# Patient Record
Sex: MALE | Race: BLACK OR AFRICAN AMERICAN | NOT HISPANIC OR LATINO | URBAN - METROPOLITAN AREA
[De-identification: names, ages, dates, MRNs, and addresses within clinical notes are randomized per-mention and may not be internally consistent; named-entity substitution may affect disease eponyms.]

---

## 2020-01-01 ENCOUNTER — INPATIENT (INPATIENT)
Facility: HOSPITAL | Age: 80
LOS: 2 days | End: 2020-10-15
Attending: INTERNAL MEDICINE | Admitting: HOSPITALIST
Payer: MEDICARE

## 2020-01-01 ENCOUNTER — INPATIENT (INPATIENT)
Facility: HOSPITAL | Age: 80
LOS: 13 days | Discharge: NURSING, REHAB OR LONG-TERM CARE | End: 2020-09-30
Attending: INTERNAL MEDICINE | Admitting: INTERNAL MEDICINE
Payer: MEDICARE

## 2020-01-01 VITALS
HEART RATE: 70 BPM | SYSTOLIC BLOOD PRESSURE: 146 MMHG | OXYGEN SATURATION: 97 % | TEMPERATURE: 98 F | RESPIRATION RATE: 16 BRPM | DIASTOLIC BLOOD PRESSURE: 74 MMHG

## 2020-01-01 VITALS
RESPIRATION RATE: 24 BRPM | SYSTOLIC BLOOD PRESSURE: 82 MMHG | DIASTOLIC BLOOD PRESSURE: 34 MMHG | WEIGHT: 169.98 LBS | OXYGEN SATURATION: 96 % | HEART RATE: 81 BPM | HEIGHT: 70 IN

## 2020-01-01 VITALS
SYSTOLIC BLOOD PRESSURE: 144 MMHG | DIASTOLIC BLOOD PRESSURE: 69 MMHG | HEART RATE: 80 BPM | RESPIRATION RATE: 18 BRPM | TEMPERATURE: 98 F | OXYGEN SATURATION: 99 %

## 2020-01-01 DIAGNOSIS — I10 ESSENTIAL (PRIMARY) HYPERTENSION: ICD-10-CM

## 2020-01-01 DIAGNOSIS — Z89.432 ACQUIRED ABSENCE OF LEFT FOOT: ICD-10-CM

## 2020-01-01 DIAGNOSIS — K61.1 RECTAL ABSCESS: ICD-10-CM

## 2020-01-01 DIAGNOSIS — E53.8 DEFICIENCY OF OTHER SPECIFIED B GROUP VITAMINS: ICD-10-CM

## 2020-01-01 DIAGNOSIS — N17.9 ACUTE KIDNEY FAILURE, UNSPECIFIED: ICD-10-CM

## 2020-01-01 DIAGNOSIS — D62 ACUTE POSTHEMORRHAGIC ANEMIA: ICD-10-CM

## 2020-01-01 DIAGNOSIS — E55.9 VITAMIN D DEFICIENCY, UNSPECIFIED: ICD-10-CM

## 2020-01-01 DIAGNOSIS — Z91.81 HISTORY OF FALLING: ICD-10-CM

## 2020-01-01 DIAGNOSIS — K51.919 ULCERATIVE COLITIS, UNSPECIFIED WITH UNSPECIFIED COMPLICATIONS: ICD-10-CM

## 2020-01-01 DIAGNOSIS — I95.9 HYPOTENSION, UNSPECIFIED: ICD-10-CM

## 2020-01-01 DIAGNOSIS — K25.4 CHRONIC OR UNSPECIFIED GASTRIC ULCER WITH HEMORRHAGE: ICD-10-CM

## 2020-01-01 DIAGNOSIS — D61.818 OTHER PANCYTOPENIA: ICD-10-CM

## 2020-01-01 DIAGNOSIS — L89.300 PRESSURE ULCER OF UNSPECIFIED BUTTOCK, UNSTAGEABLE: ICD-10-CM

## 2020-01-01 DIAGNOSIS — Z85.038 PERSONAL HISTORY OF OTHER MALIGNANT NEOPLASM OF LARGE INTESTINE: ICD-10-CM

## 2020-01-01 DIAGNOSIS — Z85.831 PERSONAL HISTORY OF MALIGNANT NEOPLASM OF SOFT TISSUE: ICD-10-CM

## 2020-01-01 DIAGNOSIS — K62.6 ULCER OF ANUS AND RECTUM: ICD-10-CM

## 2020-01-01 DIAGNOSIS — K52.9 NONINFECTIVE GASTROENTERITIS AND COLITIS, UNSPECIFIED: ICD-10-CM

## 2020-01-01 DIAGNOSIS — K20.9 ESOPHAGITIS, UNSPECIFIED: ICD-10-CM

## 2020-01-01 DIAGNOSIS — Z29.9 ENCOUNTER FOR PROPHYLACTIC MEASURES, UNSPECIFIED: ICD-10-CM

## 2020-01-01 DIAGNOSIS — E83.39 OTHER DISORDERS OF PHOSPHORUS METABOLISM: ICD-10-CM

## 2020-01-01 DIAGNOSIS — D51.9 VITAMIN B12 DEFICIENCY ANEMIA, UNSPECIFIED: ICD-10-CM

## 2020-01-01 DIAGNOSIS — Z85.46 PERSONAL HISTORY OF MALIGNANT NEOPLASM OF PROSTATE: ICD-10-CM

## 2020-01-01 DIAGNOSIS — Z96.649 PRESENCE OF UNSPECIFIED ARTIFICIAL HIP JOINT: Chronic | ICD-10-CM

## 2020-01-01 DIAGNOSIS — K64.8 OTHER HEMORRHOIDS: ICD-10-CM

## 2020-01-01 DIAGNOSIS — K29.71 GASTRITIS, UNSPECIFIED, WITH BLEEDING: ICD-10-CM

## 2020-01-01 DIAGNOSIS — D64.9 ANEMIA, UNSPECIFIED: ICD-10-CM

## 2020-01-01 DIAGNOSIS — E87.2 ACIDOSIS: ICD-10-CM

## 2020-01-01 DIAGNOSIS — Z90.49 ACQUIRED ABSENCE OF OTHER SPECIFIED PARTS OF DIGESTIVE TRACT: Chronic | ICD-10-CM

## 2020-01-01 DIAGNOSIS — D69.6 THROMBOCYTOPENIA, UNSPECIFIED: ICD-10-CM

## 2020-01-01 DIAGNOSIS — K62.89 OTHER SPECIFIED DISEASES OF ANUS AND RECTUM: ICD-10-CM

## 2020-01-01 DIAGNOSIS — E87.6 HYPOKALEMIA: ICD-10-CM

## 2020-01-01 DIAGNOSIS — I24.8 OTHER FORMS OF ACUTE ISCHEMIC HEART DISEASE: ICD-10-CM

## 2020-01-01 DIAGNOSIS — D72.829 ELEVATED WHITE BLOOD CELL COUNT, UNSPECIFIED: ICD-10-CM

## 2020-01-01 DIAGNOSIS — F43.9 REACTION TO SEVERE STRESS, UNSPECIFIED: ICD-10-CM

## 2020-01-01 DIAGNOSIS — K57.30 DIVERTICULOSIS OF LARGE INTESTINE WITHOUT PERFORATION OR ABSCESS WITHOUT BLEEDING: ICD-10-CM

## 2020-01-01 DIAGNOSIS — K92.2 GASTROINTESTINAL HEMORRHAGE, UNSPECIFIED: ICD-10-CM

## 2020-01-01 DIAGNOSIS — R94.31 ABNORMAL ELECTROCARDIOGRAM [ECG] [EKG]: ICD-10-CM

## 2020-01-01 DIAGNOSIS — Z90.49 ACQUIRED ABSENCE OF OTHER SPECIFIED PARTS OF DIGESTIVE TRACT: ICD-10-CM

## 2020-01-01 DIAGNOSIS — R06.03 ACUTE RESPIRATORY DISTRESS: ICD-10-CM

## 2020-01-01 DIAGNOSIS — Z02.9 ENCOUNTER FOR ADMINISTRATIVE EXAMINATIONS, UNSPECIFIED: ICD-10-CM

## 2020-01-01 DIAGNOSIS — K51.90 ULCERATIVE COLITIS, UNSPECIFIED, WITHOUT COMPLICATIONS: ICD-10-CM

## 2020-01-01 LAB
% ALBUMIN: 55.4 % — SIGNIFICANT CHANGE UP
% ALPHA 1: 10.3 % — SIGNIFICANT CHANGE UP
% ALPHA 2: 13.5 % — SIGNIFICANT CHANGE UP
% BETA: 9.8 % — SIGNIFICANT CHANGE UP
% GAMMA: 11 % — SIGNIFICANT CHANGE UP
% M SPIKE: 2.9 % — SIGNIFICANT CHANGE UP
24R-OH-CALCIDIOL SERPL-MCNC: 21.3 NG/ML — LOW (ref 30–80)
ABO RH CONFIRMATION: SIGNIFICANT CHANGE UP
ALBUMIN SERPL ELPH-MCNC: 1.8 G/DL — LOW (ref 3.3–5)
ALBUMIN SERPL ELPH-MCNC: 2.2 G/DL — LOW (ref 3.3–5)
ALBUMIN SERPL ELPH-MCNC: 2.7 G/DL — LOW (ref 3.3–5)
ALBUMIN SERPL ELPH-MCNC: 2.9 G/DL — LOW (ref 3.3–5)
ALBUMIN SERPL ELPH-MCNC: 2.9 G/DL — LOW (ref 3.6–5.5)
ALBUMIN SERPL ELPH-MCNC: 3.2 G/DL — LOW (ref 3.3–5)
ALBUMIN/GLOB SERPL ELPH: 1.2 RATIO — SIGNIFICANT CHANGE UP
ALP SERPL-CCNC: 42 U/L — SIGNIFICANT CHANGE UP (ref 40–120)
ALP SERPL-CCNC: 42 U/L — SIGNIFICANT CHANGE UP (ref 40–120)
ALP SERPL-CCNC: 47 U/L — SIGNIFICANT CHANGE UP (ref 40–120)
ALP SERPL-CCNC: 53 U/L — SIGNIFICANT CHANGE UP (ref 40–120)
ALP SERPL-CCNC: 91 U/L — SIGNIFICANT CHANGE UP (ref 40–120)
ALPHA1 GLOB SERPL ELPH-MCNC: 0.5 G/DL — HIGH (ref 0.1–0.4)
ALPHA2 GLOB SERPL ELPH-MCNC: 0.7 G/DL — SIGNIFICANT CHANGE UP (ref 0.5–1)
ALT FLD-CCNC: 19 U/L — SIGNIFICANT CHANGE UP (ref 4–41)
ALT FLD-CCNC: 2099 U/L — HIGH (ref 4–41)
ALT FLD-CCNC: 21 U/L — SIGNIFICANT CHANGE UP (ref 4–41)
ALT FLD-CCNC: 23 U/L — SIGNIFICANT CHANGE UP (ref 12–78)
ALT FLD-CCNC: 32 U/L — SIGNIFICANT CHANGE UP (ref 12–78)
AMMONIA BLD-MCNC: 15 UMOL/L — SIGNIFICANT CHANGE UP (ref 11–32)
ANION GAP SERPL CALC-SCNC: 10 MMO/L — SIGNIFICANT CHANGE UP (ref 7–14)
ANION GAP SERPL CALC-SCNC: 12 MMOL/L — SIGNIFICANT CHANGE UP (ref 5–17)
ANION GAP SERPL CALC-SCNC: 14 MMO/L — SIGNIFICANT CHANGE UP (ref 7–14)
ANION GAP SERPL CALC-SCNC: 20 MMO/L — HIGH (ref 7–14)
ANION GAP SERPL CALC-SCNC: 31 MMO/L — HIGH (ref 7–14)
ANION GAP SERPL CALC-SCNC: 6 MMOL/L — SIGNIFICANT CHANGE UP (ref 5–17)
ANION GAP SERPL CALC-SCNC: 6 MMOL/L — SIGNIFICANT CHANGE UP (ref 5–17)
ANION GAP SERPL CALC-SCNC: 7 MMOL/L — SIGNIFICANT CHANGE UP (ref 5–17)
ANION GAP SERPL CALC-SCNC: 9 MMOL/L — SIGNIFICANT CHANGE UP (ref 5–17)
ANISOCYTOSIS BLD QL: SLIGHT — SIGNIFICANT CHANGE UP
ANISOCYTOSIS BLD QL: SLIGHT — SIGNIFICANT CHANGE UP
APPEARANCE UR: CLEAR — SIGNIFICANT CHANGE UP
APTT BLD: 136.9 SEC — CRITICAL HIGH (ref 27–36.3)
APTT BLD: 28.9 SEC — SIGNIFICANT CHANGE UP (ref 27–36.3)
APTT BLD: 31.4 SEC — SIGNIFICANT CHANGE UP (ref 27.5–35.5)
APTT BLD: 33.9 SEC — SIGNIFICANT CHANGE UP (ref 27.5–35.5)
APTT BLD: 55.5 SEC — HIGH (ref 27–36.3)
APTT BLD: 83 SEC — HIGH (ref 27–36.3)
AST SERPL-CCNC: 1247 U/L — HIGH (ref 4–40)
AST SERPL-CCNC: 13 U/L — SIGNIFICANT CHANGE UP (ref 4–40)
AST SERPL-CCNC: 20 U/L — SIGNIFICANT CHANGE UP (ref 4–40)
AST SERPL-CCNC: 22 U/L — SIGNIFICANT CHANGE UP (ref 15–37)
AST SERPL-CCNC: 24 U/L — SIGNIFICANT CHANGE UP (ref 15–37)
B-GLOBULIN SERPL ELPH-MCNC: 0.5 G/DL — SIGNIFICANT CHANGE UP (ref 0.5–1)
BASE EXCESS BLDA CALC-SCNC: -16.6 MMOL/L — SIGNIFICANT CHANGE UP
BASE EXCESS BLDA CALC-SCNC: -18.4 MMOL/L — SIGNIFICANT CHANGE UP
BASE EXCESS BLDA CALC-SCNC: -19.3 MMOL/L — SIGNIFICANT CHANGE UP
BASE EXCESS BLDA CALC-SCNC: -2.2 MMOL/L — SIGNIFICANT CHANGE UP
BASE EXCESS BLDA CALC-SCNC: -8 MMOL/L — LOW (ref -2–2)
BASE EXCESS BLDA CALC-SCNC: -9.4 MMOL/L — SIGNIFICANT CHANGE UP
BASOPHILS # BLD AUTO: 0 K/UL — SIGNIFICANT CHANGE UP (ref 0–0.2)
BASOPHILS # BLD AUTO: 0 K/UL — SIGNIFICANT CHANGE UP (ref 0–0.2)
BASOPHILS # BLD AUTO: 0.02 K/UL — SIGNIFICANT CHANGE UP (ref 0–0.2)
BASOPHILS # BLD AUTO: 0.12 K/UL — SIGNIFICANT CHANGE UP (ref 0–0.2)
BASOPHILS # BLD AUTO: 0.23 K/UL — HIGH (ref 0–0.2)
BASOPHILS # BLD AUTO: 0.33 K/UL — HIGH (ref 0–0.2)
BASOPHILS # BLD AUTO: 0.43 K/UL — HIGH (ref 0–0.2)
BASOPHILS # BLD AUTO: 0.5 K/UL — HIGH (ref 0–0.2)
BASOPHILS NFR BLD AUTO: 0 % — SIGNIFICANT CHANGE UP (ref 0–2)
BASOPHILS NFR BLD AUTO: 0 % — SIGNIFICANT CHANGE UP (ref 0–2)
BASOPHILS NFR BLD AUTO: 0.1 % — SIGNIFICANT CHANGE UP (ref 0–2)
BASOPHILS NFR BLD AUTO: 0.2 % — SIGNIFICANT CHANGE UP (ref 0–2)
BASOPHILS NFR BLD AUTO: 0.3 % — SIGNIFICANT CHANGE UP (ref 0–2)
BASOPHILS NFR BLD AUTO: 0.3 % — SIGNIFICANT CHANGE UP (ref 0–2)
BASOPHILS NFR SPEC: 0 % — SIGNIFICANT CHANGE UP (ref 0–2)
BILIRUB DIRECT SERPL-MCNC: 0.25 MG/DL — HIGH (ref 0.05–0.2)
BILIRUB INDIRECT FLD-MCNC: 1 MG/DL — SIGNIFICANT CHANGE UP (ref 0.2–1)
BILIRUB SERPL-MCNC: 0.8 MG/DL — SIGNIFICANT CHANGE UP (ref 0.2–1.2)
BILIRUB SERPL-MCNC: 1 MG/DL — SIGNIFICANT CHANGE UP (ref 0.2–1.2)
BILIRUB SERPL-MCNC: 1.2 MG/DL — SIGNIFICANT CHANGE UP (ref 0.2–1.2)
BILIRUB SERPL-MCNC: 1.2 MG/DL — SIGNIFICANT CHANGE UP (ref 0.2–1.2)
BILIRUB SERPL-MCNC: 1.8 MG/DL — HIGH (ref 0.2–1.2)
BILIRUB UR-MCNC: NEGATIVE — SIGNIFICANT CHANGE UP
BLASTS # FLD: 0 % — SIGNIFICANT CHANGE UP (ref 0–0)
BLD GP AB SCN SERPL QL: NEGATIVE — SIGNIFICANT CHANGE UP
BLD GP AB SCN SERPL QL: NEGATIVE — SIGNIFICANT CHANGE UP
BLD GP AB SCN SERPL QL: SIGNIFICANT CHANGE UP
BLOOD GAS ARTERIAL - FIO2: 100 — SIGNIFICANT CHANGE UP
BLOOD GAS ARTERIAL - FIO2: 29 — SIGNIFICANT CHANGE UP
BLOOD GAS ARTERIAL - FIO2: 50 — SIGNIFICANT CHANGE UP
BLOOD GAS ARTERIAL - FIO2: 80 — SIGNIFICANT CHANGE UP
BLOOD GAS COMMENTS: SIGNIFICANT CHANGE UP
BLOOD GAS COMMENTS: SIGNIFICANT CHANGE UP
BLOOD GAS SOURCE: SIGNIFICANT CHANGE UP
BUN SERPL-MCNC: 11 MG/DL — SIGNIFICANT CHANGE UP (ref 7–23)
BUN SERPL-MCNC: 12 MG/DL — SIGNIFICANT CHANGE UP (ref 7–23)
BUN SERPL-MCNC: 16 MG/DL — SIGNIFICANT CHANGE UP (ref 7–23)
BUN SERPL-MCNC: 21 MG/DL — SIGNIFICANT CHANGE UP (ref 7–23)
BUN SERPL-MCNC: 23 MG/DL — SIGNIFICANT CHANGE UP (ref 7–23)
BUN SERPL-MCNC: 27 MG/DL — HIGH (ref 7–23)
BUN SERPL-MCNC: 35 MG/DL — HIGH (ref 7–23)
BUN SERPL-MCNC: 36 MG/DL — HIGH (ref 7–23)
BUN SERPL-MCNC: 39 MG/DL — HIGH (ref 7–23)
BUN SERPL-MCNC: 39 MG/DL — HIGH (ref 7–23)
BUN SERPL-MCNC: 53 MG/DL — HIGH (ref 7–23)
BUN SERPL-MCNC: 7 MG/DL — SIGNIFICANT CHANGE UP (ref 7–23)
BUN SERPL-MCNC: 8 MG/DL — SIGNIFICANT CHANGE UP (ref 7–23)
BUN SERPL-MCNC: 82 MG/DL — HIGH (ref 7–23)
BUN SERPL-MCNC: 87 MG/DL — HIGH (ref 7–23)
CA-I BLDA-SCNC: 1.11 MMOL/L — LOW (ref 1.15–1.29)
CA-I BLDA-SCNC: 1.13 MMOL/L — LOW (ref 1.15–1.29)
CALCIUM SERPL-MCNC: 6.9 MG/DL — LOW (ref 8.4–10.5)
CALCIUM SERPL-MCNC: 7.3 MG/DL — LOW (ref 8.5–10.1)
CALCIUM SERPL-MCNC: 7.3 MG/DL — LOW (ref 8.5–10.1)
CALCIUM SERPL-MCNC: 7.4 MG/DL — LOW (ref 8.5–10.1)
CALCIUM SERPL-MCNC: 7.5 MG/DL — LOW (ref 8.5–10.1)
CALCIUM SERPL-MCNC: 7.6 MG/DL — LOW (ref 8.5–10.1)
CALCIUM SERPL-MCNC: 7.7 MG/DL — LOW (ref 8.4–10.5)
CALCIUM SERPL-MCNC: 7.9 MG/DL — LOW (ref 8.5–10.1)
CALCIUM SERPL-MCNC: 7.9 MG/DL — LOW (ref 8.5–10.1)
CALCIUM SERPL-MCNC: 8 MG/DL — LOW (ref 8.5–10.1)
CALCIUM SERPL-MCNC: 8.3 MG/DL — LOW (ref 8.5–10.1)
CALCIUM SERPL-MCNC: 9.3 MG/DL — SIGNIFICANT CHANGE UP (ref 8.4–10.5)
CALCIUM SERPL-MCNC: 9.4 MG/DL — SIGNIFICANT CHANGE UP (ref 8.4–10.5)
CHLORIDE BLDA-SCNC: 110 MMOL/L — HIGH (ref 96–108)
CHLORIDE BLDA-SCNC: 111 MMOL/L — HIGH (ref 96–108)
CHLORIDE SERPL-SCNC: 102 MMOL/L — SIGNIFICANT CHANGE UP (ref 98–107)
CHLORIDE SERPL-SCNC: 103 MMOL/L — SIGNIFICANT CHANGE UP (ref 98–107)
CHLORIDE SERPL-SCNC: 104 MMOL/L — SIGNIFICANT CHANGE UP (ref 96–108)
CHLORIDE SERPL-SCNC: 105 MMOL/L — SIGNIFICANT CHANGE UP (ref 98–107)
CHLORIDE SERPL-SCNC: 106 MMOL/L — SIGNIFICANT CHANGE UP (ref 98–107)
CHLORIDE SERPL-SCNC: 109 MMOL/L — HIGH (ref 96–108)
CHLORIDE SERPL-SCNC: 110 MMOL/L — HIGH (ref 96–108)
CHLORIDE SERPL-SCNC: 111 MMOL/L — HIGH (ref 96–108)
CHLORIDE SERPL-SCNC: 112 MMOL/L — HIGH (ref 96–108)
CHLORIDE SERPL-SCNC: 112 MMOL/L — HIGH (ref 96–108)
CHLORIDE SERPL-SCNC: 113 MMOL/L — HIGH (ref 96–108)
CHLORIDE SERPL-SCNC: 113 MMOL/L — HIGH (ref 96–108)
CHLORIDE SERPL-SCNC: 114 MMOL/L — HIGH (ref 96–108)
CHLORIDE SERPL-SCNC: 117 MMOL/L — HIGH (ref 96–108)
CHLORIDE SERPL-SCNC: 118 MMOL/L — HIGH (ref 96–108)
CHROM ANALY INTERPHASE BLD FISH-IMP: SIGNIFICANT CHANGE UP
CHROM ANALY OVERALL INTERP SPEC-IMP: SIGNIFICANT CHANGE UP
CO2 SERPL-SCNC: 16 MMOL/L — LOW (ref 22–31)
CO2 SERPL-SCNC: 20 MMOL/L — LOW (ref 22–31)
CO2 SERPL-SCNC: 21 MMOL/L — LOW (ref 22–31)
CO2 SERPL-SCNC: 21 MMOL/L — LOW (ref 22–31)
CO2 SERPL-SCNC: 22 MMOL/L — SIGNIFICANT CHANGE UP (ref 22–31)
CO2 SERPL-SCNC: 23 MMOL/L — SIGNIFICANT CHANGE UP (ref 22–31)
CO2 SERPL-SCNC: 24 MMOL/L — SIGNIFICANT CHANGE UP (ref 22–31)
CO2 SERPL-SCNC: 24 MMOL/L — SIGNIFICANT CHANGE UP (ref 22–31)
CO2 SERPL-SCNC: 25 MMOL/L — SIGNIFICANT CHANGE UP (ref 22–31)
CO2 SERPL-SCNC: 7 MMOL/L — CRITICAL LOW (ref 22–31)
COLOR SPEC: YELLOW — SIGNIFICANT CHANGE UP
CREAT SERPL-MCNC: 0.52 MG/DL — SIGNIFICANT CHANGE UP (ref 0.5–1.3)
CREAT SERPL-MCNC: 0.56 MG/DL — SIGNIFICANT CHANGE UP (ref 0.5–1.3)
CREAT SERPL-MCNC: 0.56 MG/DL — SIGNIFICANT CHANGE UP (ref 0.5–1.3)
CREAT SERPL-MCNC: 0.58 MG/DL — SIGNIFICANT CHANGE UP (ref 0.5–1.3)
CREAT SERPL-MCNC: 0.58 MG/DL — SIGNIFICANT CHANGE UP (ref 0.5–1.3)
CREAT SERPL-MCNC: 0.6 MG/DL — SIGNIFICANT CHANGE UP (ref 0.5–1.3)
CREAT SERPL-MCNC: 0.62 MG/DL — SIGNIFICANT CHANGE UP (ref 0.5–1.3)
CREAT SERPL-MCNC: 0.67 MG/DL — SIGNIFICANT CHANGE UP (ref 0.5–1.3)
CREAT SERPL-MCNC: 0.7 MG/DL — SIGNIFICANT CHANGE UP (ref 0.5–1.3)
CREAT SERPL-MCNC: 0.75 MG/DL — SIGNIFICANT CHANGE UP (ref 0.5–1.3)
CREAT SERPL-MCNC: 0.85 MG/DL — SIGNIFICANT CHANGE UP (ref 0.5–1.3)
CREAT SERPL-MCNC: 0.93 MG/DL — SIGNIFICANT CHANGE UP (ref 0.5–1.3)
CREAT SERPL-MCNC: 0.95 MG/DL — SIGNIFICANT CHANGE UP (ref 0.5–1.3)
CREAT SERPL-MCNC: 1.02 MG/DL — SIGNIFICANT CHANGE UP (ref 0.5–1.3)
CREAT SERPL-MCNC: 2.39 MG/DL — HIGH (ref 0.5–1.3)
CULTURE RESULTS: NO GROWTH — SIGNIFICANT CHANGE UP
CULTURE RESULTS: SIGNIFICANT CHANGE UP
D DIMER BLD IA.RAPID-MCNC: 3345 NG/ML — SIGNIFICANT CHANGE UP
D DIMER BLD IA.RAPID-MCNC: 3583 NG/ML — SIGNIFICANT CHANGE UP
DIFF PNL FLD: NEGATIVE — SIGNIFICANT CHANGE UP
E COLI DNA BLD POS QL NAA+NON-PROBE: SIGNIFICANT CHANGE UP
ELLIPTOCYTES BLD QL SMEAR: SLIGHT — SIGNIFICANT CHANGE UP
ELLIPTOCYTES BLD QL SMEAR: SLIGHT — SIGNIFICANT CHANGE UP
EOSINOPHIL # BLD AUTO: 0 K/UL — SIGNIFICANT CHANGE UP (ref 0–0.5)
EOSINOPHIL # BLD AUTO: 0.01 K/UL — SIGNIFICANT CHANGE UP (ref 0–0.5)
EOSINOPHIL # BLD AUTO: 0.02 K/UL — SIGNIFICANT CHANGE UP (ref 0–0.5)
EOSINOPHIL # BLD AUTO: 0.04 K/UL — SIGNIFICANT CHANGE UP (ref 0–0.5)
EOSINOPHIL # BLD AUTO: 0.05 K/UL — SIGNIFICANT CHANGE UP (ref 0–0.5)
EOSINOPHIL # BLD AUTO: 0.32 K/UL — SIGNIFICANT CHANGE UP (ref 0–0.5)
EOSINOPHIL NFR BLD AUTO: 0 % — SIGNIFICANT CHANGE UP (ref 0–6)
EOSINOPHIL NFR BLD AUTO: 0.1 % — SIGNIFICANT CHANGE UP (ref 0–6)
EOSINOPHIL NFR BLD AUTO: 0.2 % — SIGNIFICANT CHANGE UP (ref 0–6)
EOSINOPHIL NFR FLD: 0 % — SIGNIFICANT CHANGE UP (ref 0–6)
FERRITIN SERPL-MCNC: 562 NG/ML — HIGH (ref 30–400)
FIBRINOGEN PPP-MCNC: 179 MG/DL — LOW (ref 290–520)
FIBRINOGEN PPP-MCNC: 246 MG/DL — LOW (ref 290–520)
FIBRINOGEN PPP-MCNC: 398 MG/DL — SIGNIFICANT CHANGE UP (ref 350–510)
FIBRINOGEN PPP-MCNC: SIGNIFICANT CHANGE UP MG/DL (ref 290–520)
FOLATE SERPL-MCNC: 2.6 NG/ML — LOW
GAMMA GLOBULIN: 0.6 G/DL — SIGNIFICANT CHANGE UP (ref 0.6–1.6)
GIANT PLATELETS BLD QL SMEAR: PRESENT — SIGNIFICANT CHANGE UP
GLUCOSE BLDA-MCNC: 114 MG/DL — HIGH (ref 70–99)
GLUCOSE BLDA-MCNC: 140 MG/DL — HIGH (ref 70–99)
GLUCOSE BLDA-MCNC: 28 MG/DL — CRITICAL LOW (ref 70–99)
GLUCOSE BLDA-MCNC: 78 MG/DL — SIGNIFICANT CHANGE UP (ref 70–99)
GLUCOSE BLDA-MCNC: 78 MG/DL — SIGNIFICANT CHANGE UP (ref 70–99)
GLUCOSE BLDC GLUCOMTR-MCNC: 108 MG/DL — HIGH (ref 70–99)
GLUCOSE BLDC GLUCOMTR-MCNC: 110 MG/DL — HIGH (ref 70–99)
GLUCOSE BLDC GLUCOMTR-MCNC: 119 MG/DL — HIGH (ref 70–99)
GLUCOSE BLDC GLUCOMTR-MCNC: 131 MG/DL — HIGH (ref 70–99)
GLUCOSE BLDC GLUCOMTR-MCNC: 133 MG/DL — HIGH (ref 70–99)
GLUCOSE BLDC GLUCOMTR-MCNC: 160 MG/DL — HIGH (ref 70–99)
GLUCOSE BLDC GLUCOMTR-MCNC: 26 MG/DL — CRITICAL LOW (ref 70–99)
GLUCOSE SERPL-MCNC: 106 MG/DL — HIGH (ref 70–99)
GLUCOSE SERPL-MCNC: 108 MG/DL — HIGH (ref 70–99)
GLUCOSE SERPL-MCNC: 109 MG/DL — HIGH (ref 70–99)
GLUCOSE SERPL-MCNC: 110 MG/DL — HIGH (ref 70–99)
GLUCOSE SERPL-MCNC: 111 MG/DL — HIGH (ref 70–99)
GLUCOSE SERPL-MCNC: 115 MG/DL — HIGH (ref 70–99)
GLUCOSE SERPL-MCNC: 128 MG/DL — HIGH (ref 70–99)
GLUCOSE SERPL-MCNC: 131 MG/DL — HIGH (ref 70–99)
GLUCOSE SERPL-MCNC: 136 MG/DL — HIGH (ref 70–99)
GLUCOSE SERPL-MCNC: 160 MG/DL — HIGH (ref 70–99)
GLUCOSE SERPL-MCNC: 174 MG/DL — HIGH (ref 70–99)
GLUCOSE SERPL-MCNC: 196 MG/DL — HIGH (ref 70–99)
GLUCOSE SERPL-MCNC: 266 MG/DL — HIGH (ref 70–99)
GLUCOSE SERPL-MCNC: 80 MG/DL — SIGNIFICANT CHANGE UP (ref 70–99)
GLUCOSE SERPL-MCNC: 88 MG/DL — SIGNIFICANT CHANGE UP (ref 70–99)
GLUCOSE UR QL: NEGATIVE MG/DL — SIGNIFICANT CHANGE UP
GRAM STN FLD: SIGNIFICANT CHANGE UP
HAPTOGLOB SERPL-MCNC: 100 MG/DL — SIGNIFICANT CHANGE UP (ref 34–200)
HAPTOGLOB SERPL-MCNC: < 20 MG/DL — LOW (ref 34–200)
HCO3 BLDA-SCNC: 10 MMOL/L — CRITICAL LOW (ref 22–26)
HCO3 BLDA-SCNC: 10 MMOL/L — CRITICAL LOW (ref 22–26)
HCO3 BLDA-SCNC: 12 MMOL/L — LOW (ref 22–26)
HCO3 BLDA-SCNC: 15 MMOL/L — LOW (ref 21–29)
HCO3 BLDA-SCNC: 17 MMOL/L — LOW (ref 22–26)
HCO3 BLDA-SCNC: 23 MMOL/L — SIGNIFICANT CHANGE UP (ref 22–26)
HCT VFR BLD CALC: 15.5 % — CRITICAL LOW (ref 39–50)
HCT VFR BLD CALC: 19.4 % — CRITICAL LOW (ref 39–50)
HCT VFR BLD CALC: 21.3 % — LOW (ref 39–50)
HCT VFR BLD CALC: 21.7 % — LOW (ref 39–50)
HCT VFR BLD CALC: 21.8 % — LOW (ref 39–50)
HCT VFR BLD CALC: 21.9 % — LOW (ref 39–50)
HCT VFR BLD CALC: 23.1 % — LOW (ref 39–50)
HCT VFR BLD CALC: 23.7 % — LOW (ref 39–50)
HCT VFR BLD CALC: 25 % — LOW (ref 39–50)
HCT VFR BLD CALC: 25.9 % — LOW (ref 39–50)
HCT VFR BLD CALC: 26.2 % — LOW (ref 39–50)
HCT VFR BLD CALC: 26.9 % — LOW (ref 39–50)
HCT VFR BLD CALC: 27.2 % — LOW (ref 39–50)
HCT VFR BLD CALC: 27.6 % — LOW (ref 39–50)
HCT VFR BLD CALC: 28.3 % — LOW (ref 39–50)
HCT VFR BLD CALC: 28.6 % — LOW (ref 39–50)
HCT VFR BLD CALC: 28.6 % — LOW (ref 39–50)
HCT VFR BLD CALC: 29.2 % — LOW (ref 39–50)
HCT VFR BLD CALC: 29.5 % — LOW (ref 39–50)
HCT VFR BLD CALC: 32.5 % — LOW (ref 39–50)
HCT VFR BLDA CALC: 22.7 % — LOW (ref 39–51)
HCT VFR BLDA CALC: 23 % — LOW (ref 39–51)
HCT VFR BLDA CALC: 23 % — LOW (ref 39–51)
HCT VFR BLDA CALC: 23.5 % — LOW (ref 39–51)
HCT VFR BLDA CALC: 23.9 % — LOW (ref 39–51)
HGB BLD-MCNC: 10.7 G/DL — LOW (ref 13–17)
HGB BLD-MCNC: 5 G/DL — CRITICAL LOW (ref 13–17)
HGB BLD-MCNC: 6.4 G/DL — CRITICAL LOW (ref 13–17)
HGB BLD-MCNC: 6.9 G/DL — CRITICAL LOW (ref 13–17)
HGB BLD-MCNC: 7.2 G/DL — LOW (ref 13–17)
HGB BLD-MCNC: 7.2 G/DL — LOW (ref 13–17)
HGB BLD-MCNC: 7.3 G/DL — LOW (ref 13–17)
HGB BLD-MCNC: 7.3 G/DL — LOW (ref 13–17)
HGB BLD-MCNC: 7.6 G/DL — LOW (ref 13–17)
HGB BLD-MCNC: 8 G/DL — LOW (ref 13–17)
HGB BLD-MCNC: 8 G/DL — LOW (ref 13–17)
HGB BLD-MCNC: 8.6 G/DL — LOW (ref 13–17)
HGB BLD-MCNC: 8.8 G/DL — LOW (ref 13–17)
HGB BLD-MCNC: 8.8 G/DL — LOW (ref 13–17)
HGB BLD-MCNC: 8.9 G/DL — LOW (ref 13–17)
HGB BLD-MCNC: 8.9 G/DL — LOW (ref 13–17)
HGB BLD-MCNC: 9.3 G/DL — LOW (ref 13–17)
HGB BLD-MCNC: 9.5 G/DL — LOW (ref 13–17)
HGB BLDA-MCNC: 7.3 G/DL — LOW (ref 13–17)
HGB BLDA-MCNC: 7.4 G/DL — LOW (ref 13–17)
HGB BLDA-MCNC: 7.4 G/DL — LOW (ref 13–17)
HGB BLDA-MCNC: 7.5 G/DL — LOW (ref 13–17)
HGB BLDA-MCNC: 7.7 G/DL — LOW (ref 13–17)
HOROWITZ INDEX BLDA+IHG-RTO: 21 — SIGNIFICANT CHANGE UP
HYPOCHROMIA BLD QL: SLIGHT — SIGNIFICANT CHANGE UP
IMM GRANULOCYTES NFR BLD AUTO: 25.1 % — HIGH (ref 0–1.5)
IMM GRANULOCYTES NFR BLD AUTO: 26.6 % — HIGH (ref 0–1.5)
IMM GRANULOCYTES NFR BLD AUTO: 26.8 % — HIGH (ref 0–1.5)
IMM GRANULOCYTES NFR BLD AUTO: 32.5 % — HIGH (ref 0–1.5)
IMM GRANULOCYTES NFR BLD AUTO: 34.2 % — HIGH (ref 0–1.5)
IMM GRANULOCYTES NFR BLD AUTO: 7.1 % — HIGH (ref 0–1.5)
INR BLD: 1.26 RATIO — HIGH (ref 0.88–1.16)
INR BLD: 1.35 — HIGH (ref 0.88–1.16)
INR BLD: 1.37 RATIO — HIGH (ref 0.88–1.16)
INR BLD: 11.7 — CRITICAL HIGH (ref 0.88–1.16)
INTERPRETATION SERPL IFE-IMP: SIGNIFICANT CHANGE UP
IRON SATN MFR SERPL: 18 % — SIGNIFICANT CHANGE UP (ref 16–55)
IRON SATN MFR SERPL: 26 UG/DL — LOW (ref 45–165)
KAPPA LC SER QL IFE: 2.14 MG/DL — HIGH (ref 0.33–1.94)
KAPPA/LAMBDA FREE LIGHT CHAIN RATIO, SERUM: 1.66 RATIO — HIGH (ref 0.26–1.65)
KETONES UR-MCNC: ABNORMAL
LACTATE BLDA-SCNC: 10.2 MMOL/L — CRITICAL HIGH (ref 0.5–2)
LACTATE BLDA-SCNC: 10.9 MMOL/L — CRITICAL HIGH (ref 0.5–2)
LACTATE BLDA-SCNC: 17 MMOL/L — CRITICAL HIGH (ref 0.5–2)
LACTATE BLDA-SCNC: 20 MMOL/L — CRITICAL HIGH (ref 0.5–2)
LACTATE BLDA-SCNC: 7.1 MMOL/L — CRITICAL HIGH (ref 0.5–2)
LACTATE SERPL-SCNC: 1.8 MMOL/L — SIGNIFICANT CHANGE UP (ref 0.7–2)
LACTATE SERPL-SCNC: 2.4 MMOL/L — HIGH (ref 0.7–2)
LAMBDA LC SER QL IFE: 1.29 MG/DL — SIGNIFICANT CHANGE UP (ref 0.57–2.63)
LDH SERPL L TO P-CCNC: 255 U/L — HIGH (ref 50–242)
LDH SERPL L TO P-CCNC: 562 U/L — HIGH (ref 135–225)
LDH SERPL L TO P-CCNC: 639 U/L — HIGH (ref 135–225)
LDH SERPL L TO P-CCNC: > 1800 U/L — HIGH (ref 135–225)
LDH SERPL L TO P-CCNC: > 1800 U/L — HIGH (ref 135–225)
LEUKOCYTE ESTERASE UR-ACNC: NEGATIVE — SIGNIFICANT CHANGE UP
LYMPHOCYTES # BLD AUTO: 0.56 K/UL — LOW (ref 1–3.3)
LYMPHOCYTES # BLD AUTO: 0.66 K/UL — LOW (ref 1–3.3)
LYMPHOCYTES # BLD AUTO: 0.9 % — LOW (ref 13–44)
LYMPHOCYTES # BLD AUTO: 0.92 K/UL — LOW (ref 1–3.3)
LYMPHOCYTES # BLD AUTO: 1 % — LOW (ref 13–44)
LYMPHOCYTES # BLD AUTO: 1.1 % — LOW (ref 13–44)
LYMPHOCYTES # BLD AUTO: 1.2 K/UL — SIGNIFICANT CHANGE UP (ref 1–3.3)
LYMPHOCYTES # BLD AUTO: 1.8 % — LOW (ref 13–44)
LYMPHOCYTES # BLD AUTO: 11 % — LOW (ref 13–44)
LYMPHOCYTES # BLD AUTO: 12 % — LOW (ref 13–44)
LYMPHOCYTES # BLD AUTO: 2 K/UL — SIGNIFICANT CHANGE UP (ref 1–3.3)
LYMPHOCYTES # BLD AUTO: 2.12 K/UL — SIGNIFICANT CHANGE UP (ref 1–3.3)
LYMPHOCYTES # BLD AUTO: 2.33 K/UL — SIGNIFICANT CHANGE UP (ref 1–3.3)
LYMPHOCYTES # BLD AUTO: 3.1 % — LOW (ref 13–44)
LYMPHOCYTES # BLD AUTO: 4 % — LOW (ref 13–44)
LYMPHOCYTES # BLD AUTO: 5.51 K/UL — HIGH (ref 1–3.3)
LYMPHOCYTES NFR SPEC AUTO: 2.6 % — LOW (ref 13–44)
M-SPIKE: 0.2 G/DL — HIGH (ref 0–0)
MAGNESIUM SERPL-MCNC: 1.6 MG/DL — SIGNIFICANT CHANGE UP (ref 1.6–2.6)
MAGNESIUM SERPL-MCNC: 1.9 MG/DL — SIGNIFICANT CHANGE UP (ref 1.6–2.6)
MAGNESIUM SERPL-MCNC: 2 MG/DL — SIGNIFICANT CHANGE UP (ref 1.6–2.6)
MAGNESIUM SERPL-MCNC: 2.1 MG/DL — SIGNIFICANT CHANGE UP (ref 1.6–2.6)
MAGNESIUM SERPL-MCNC: 2.1 MG/DL — SIGNIFICANT CHANGE UP (ref 1.6–2.6)
MAGNESIUM SERPL-MCNC: 2.2 MG/DL — SIGNIFICANT CHANGE UP (ref 1.6–2.6)
MAGNESIUM SERPL-MCNC: 2.3 MG/DL — SIGNIFICANT CHANGE UP (ref 1.6–2.6)
MAGNESIUM SERPL-MCNC: 2.4 MG/DL — SIGNIFICANT CHANGE UP (ref 1.6–2.6)
MAGNESIUM SERPL-MCNC: 2.5 MG/DL — SIGNIFICANT CHANGE UP (ref 1.6–2.6)
MAGNESIUM SERPL-MCNC: 2.6 MG/DL — SIGNIFICANT CHANGE UP (ref 1.6–2.6)
MAGNESIUM SERPL-MCNC: 3 MG/DL — HIGH (ref 1.6–2.6)
MAGNESIUM SERPL-MCNC: 3.1 MG/DL — HIGH (ref 1.6–2.6)
MANUAL SMEAR VERIFICATION: SIGNIFICANT CHANGE UP
MCHC RBC-ENTMCNC: 26.9 PG — LOW (ref 27–34)
MCHC RBC-ENTMCNC: 27 PG — SIGNIFICANT CHANGE UP (ref 27–34)
MCHC RBC-ENTMCNC: 27.1 PG — SIGNIFICANT CHANGE UP (ref 27–34)
MCHC RBC-ENTMCNC: 27.2 PG — SIGNIFICANT CHANGE UP (ref 27–34)
MCHC RBC-ENTMCNC: 27.5 PG — SIGNIFICANT CHANGE UP (ref 27–34)
MCHC RBC-ENTMCNC: 27.6 PG — SIGNIFICANT CHANGE UP (ref 27–34)
MCHC RBC-ENTMCNC: 27.6 PG — SIGNIFICANT CHANGE UP (ref 27–34)
MCHC RBC-ENTMCNC: 27.8 PG — SIGNIFICANT CHANGE UP (ref 27–34)
MCHC RBC-ENTMCNC: 27.9 PG — SIGNIFICANT CHANGE UP (ref 27–34)
MCHC RBC-ENTMCNC: 28 PG — SIGNIFICANT CHANGE UP (ref 27–34)
MCHC RBC-ENTMCNC: 28.3 PG — SIGNIFICANT CHANGE UP (ref 27–34)
MCHC RBC-ENTMCNC: 29 PG — SIGNIFICANT CHANGE UP (ref 27–34)
MCHC RBC-ENTMCNC: 29.2 PG — SIGNIFICANT CHANGE UP (ref 27–34)
MCHC RBC-ENTMCNC: 29.2 PG — SIGNIFICANT CHANGE UP (ref 27–34)
MCHC RBC-ENTMCNC: 30.8 % — LOW (ref 32–36)
MCHC RBC-ENTMCNC: 30.9 % — LOW (ref 32–36)
MCHC RBC-ENTMCNC: 31.1 GM/DL — LOW (ref 32–36)
MCHC RBC-ENTMCNC: 31.1 GM/DL — LOW (ref 32–36)
MCHC RBC-ENTMCNC: 31.5 % — LOW (ref 32–36)
MCHC RBC-ENTMCNC: 31.6 % — LOW (ref 32–36)
MCHC RBC-ENTMCNC: 31.6 GM/DL — LOW (ref 32–36)
MCHC RBC-ENTMCNC: 31.8 GM/DL — LOW (ref 32–36)
MCHC RBC-ENTMCNC: 32 % — SIGNIFICANT CHANGE UP (ref 32–36)
MCHC RBC-ENTMCNC: 32.2 GM/DL — SIGNIFICANT CHANGE UP (ref 32–36)
MCHC RBC-ENTMCNC: 32.3 % — SIGNIFICANT CHANGE UP (ref 32–36)
MCHC RBC-ENTMCNC: 32.5 GM/DL — SIGNIFICANT CHANGE UP (ref 32–36)
MCHC RBC-ENTMCNC: 32.9 GM/DL — SIGNIFICANT CHANGE UP (ref 32–36)
MCHC RBC-ENTMCNC: 33 GM/DL — SIGNIFICANT CHANGE UP (ref 32–36)
MCHC RBC-ENTMCNC: 33 GM/DL — SIGNIFICANT CHANGE UP (ref 32–36)
MCHC RBC-ENTMCNC: 33.1 GM/DL — SIGNIFICANT CHANGE UP (ref 32–36)
MCHC RBC-ENTMCNC: 33.6 GM/DL — SIGNIFICANT CHANGE UP (ref 32–36)
MCHC RBC-ENTMCNC: 33.6 PG — SIGNIFICANT CHANGE UP (ref 27–34)
MCHC RBC-ENTMCNC: 33.7 GM/DL — SIGNIFICANT CHANGE UP (ref 32–36)
MCHC RBC-ENTMCNC: 33.8 GM/DL — SIGNIFICANT CHANGE UP (ref 32–36)
MCHC RBC-ENTMCNC: 35 GM/DL — SIGNIFICANT CHANGE UP (ref 32–36)
MCV RBC AUTO: 104 FL — HIGH (ref 80–100)
MCV RBC AUTO: 81.5 FL — SIGNIFICANT CHANGE UP (ref 80–100)
MCV RBC AUTO: 82.8 FL — SIGNIFICANT CHANGE UP (ref 80–100)
MCV RBC AUTO: 82.9 FL — SIGNIFICANT CHANGE UP (ref 80–100)
MCV RBC AUTO: 82.9 FL — SIGNIFICANT CHANGE UP (ref 80–100)
MCV RBC AUTO: 83.4 FL — SIGNIFICANT CHANGE UP (ref 80–100)
MCV RBC AUTO: 84 FL — SIGNIFICANT CHANGE UP (ref 80–100)
MCV RBC AUTO: 84.6 FL — SIGNIFICANT CHANGE UP (ref 80–100)
MCV RBC AUTO: 85.5 FL — SIGNIFICANT CHANGE UP (ref 80–100)
MCV RBC AUTO: 85.6 FL — SIGNIFICANT CHANGE UP (ref 80–100)
MCV RBC AUTO: 85.8 FL — SIGNIFICANT CHANGE UP (ref 80–100)
MCV RBC AUTO: 85.8 FL — SIGNIFICANT CHANGE UP (ref 80–100)
MCV RBC AUTO: 86.6 FL — SIGNIFICANT CHANGE UP (ref 80–100)
MCV RBC AUTO: 86.8 FL — SIGNIFICANT CHANGE UP (ref 80–100)
MCV RBC AUTO: 87.4 FL — SIGNIFICANT CHANGE UP (ref 80–100)
MCV RBC AUTO: 87.5 FL — SIGNIFICANT CHANGE UP (ref 80–100)
MCV RBC AUTO: 88.3 FL — SIGNIFICANT CHANGE UP (ref 80–100)
MCV RBC AUTO: 89.9 FL — SIGNIFICANT CHANGE UP (ref 80–100)
MCV RBC AUTO: 92.4 FL — SIGNIFICANT CHANGE UP (ref 80–100)
MCV RBC AUTO: 94.8 FL — SIGNIFICANT CHANGE UP (ref 80–100)
METAMYELOCYTES # FLD: 12.2 % — HIGH (ref 0–1)
METAMYELOCYTES # FLD: 3 % — HIGH (ref 0–0)
METHOD TYPE: SIGNIFICANT CHANGE UP
MICROCYTES BLD QL: SLIGHT — SIGNIFICANT CHANGE UP
MONOCYTES # BLD AUTO: 1.31 K/UL — HIGH (ref 0–0.9)
MONOCYTES # BLD AUTO: 14.98 K/UL — HIGH (ref 0–0.9)
MONOCYTES # BLD AUTO: 17.28 K/UL — HIGH (ref 0–0.9)
MONOCYTES # BLD AUTO: 21.51 K/UL — HIGH (ref 0–0.9)
MONOCYTES # BLD AUTO: 3 K/UL — HIGH (ref 0–0.9)
MONOCYTES # BLD AUTO: 3.26 K/UL — HIGH (ref 0–0.9)
MONOCYTES # BLD AUTO: 6.6 K/UL — HIGH (ref 0–0.9)
MONOCYTES # BLD AUTO: 8.18 K/UL — HIGH (ref 0–0.9)
MONOCYTES NFR BLD AUTO: 11.4 % — SIGNIFICANT CHANGE UP (ref 2–14)
MONOCYTES NFR BLD AUTO: 12 % — SIGNIFICANT CHANGE UP (ref 2–14)
MONOCYTES NFR BLD AUTO: 12 % — SIGNIFICANT CHANGE UP (ref 2–14)
MONOCYTES NFR BLD AUTO: 17 % — HIGH (ref 2–14)
MONOCYTES NFR BLD AUTO: 23.3 % — HIGH (ref 2–14)
MONOCYTES NFR BLD AUTO: 8.5 % — SIGNIFICANT CHANGE UP (ref 2–14)
MONOCYTES NFR BLD AUTO: 8.6 % — SIGNIFICANT CHANGE UP (ref 2–14)
MONOCYTES NFR BLD AUTO: 9.5 % — SIGNIFICANT CHANGE UP (ref 2–14)
MONOCYTES NFR BLD: 13 % — HIGH (ref 2–9)
MYELOCYTES NFR BLD: 9.6 % — HIGH (ref 0–0)
NEUTROPHIL AB SER-ACNC: 55.7 % — SIGNIFICANT CHANGE UP (ref 43–77)
NEUTROPHILS # BLD AUTO: 106.59 K/UL — HIGH (ref 1.8–7.4)
NEUTROPHILS # BLD AUTO: 114.33 K/UL — HIGH (ref 1.8–7.4)
NEUTROPHILS # BLD AUTO: 12.53 K/UL — HIGH (ref 1.8–7.4)
NEUTROPHILS # BLD AUTO: 43.1 K/UL — HIGH (ref 1.8–7.4)
NEUTROPHILS # BLD AUTO: 55.26 K/UL — HIGH (ref 1.8–7.4)
NEUTROPHILS # BLD AUTO: 77.97 K/UL — HIGH (ref 1.8–7.4)
NEUTROPHILS # BLD AUTO: 8.06 K/UL — HIGH (ref 1.8–7.4)
NEUTROPHILS # BLD AUTO: 9.15 K/UL — HIGH (ref 1.8–7.4)
NEUTROPHILS NFR BLD AUTO: 56 % — SIGNIFICANT CHANGE UP (ref 43–77)
NEUTROPHILS NFR BLD AUTO: 57.8 % — SIGNIFICANT CHANGE UP (ref 43–77)
NEUTROPHILS NFR BLD AUTO: 59.5 % — SIGNIFICANT CHANGE UP (ref 43–77)
NEUTROPHILS NFR BLD AUTO: 59.5 % — SIGNIFICANT CHANGE UP (ref 43–77)
NEUTROPHILS NFR BLD AUTO: 62.6 % — SIGNIFICANT CHANGE UP (ref 43–77)
NEUTROPHILS NFR BLD AUTO: 65.5 % — SIGNIFICANT CHANGE UP (ref 43–77)
NEUTROPHILS NFR BLD AUTO: 71 % — SIGNIFICANT CHANGE UP (ref 43–77)
NEUTROPHILS NFR BLD AUTO: 74 % — SIGNIFICANT CHANGE UP (ref 43–77)
NEUTS BAND # BLD: 5.2 % — SIGNIFICANT CHANGE UP (ref 0–6)
NITRITE UR-MCNC: NEGATIVE — SIGNIFICANT CHANGE UP
NRBC # BLD: 0 /100 WBCS — SIGNIFICANT CHANGE UP (ref 0–0)
NRBC # BLD: 0 /100 — SIGNIFICANT CHANGE UP (ref 0–0)
NRBC # BLD: 0 /100 — SIGNIFICANT CHANGE UP (ref 0–0)
NRBC # BLD: SIGNIFICANT CHANGE UP /100 WBCS (ref 0–0)
NRBC # BLD: SIGNIFICANT CHANGE UP /100 WBCS (ref 0–0)
NRBC # FLD: 0.02 K/UL — SIGNIFICANT CHANGE UP (ref 0–0)
NRBC # FLD: 0.09 K/UL — SIGNIFICANT CHANGE UP (ref 0–0)
NRBC # FLD: 0.34 K/UL — SIGNIFICANT CHANGE UP (ref 0–0)
NRBC # FLD: 0.41 K/UL — SIGNIFICANT CHANGE UP (ref 0–0)
NRBC # FLD: 0.64 K/UL — SIGNIFICANT CHANGE UP (ref 0–0)
NRBC # FLD: 0.66 K/UL — SIGNIFICANT CHANGE UP (ref 0–0)
OB PNL STL: POSITIVE
OTHER - HEMATOLOGY %: 0 — SIGNIFICANT CHANGE UP
OVALOCYTES BLD QL SMEAR: SLIGHT — SIGNIFICANT CHANGE UP
OVALOCYTES BLD QL SMEAR: SLIGHT — SIGNIFICANT CHANGE UP
PCO2 BLDA: 21 MMHG — LOW (ref 32–46)
PCO2 BLDA: 24 MMHG — LOW (ref 35–48)
PCO2 BLDA: 27 MMHG — LOW (ref 35–48)
PCO2 BLDA: 29 MMHG — LOW (ref 35–48)
PCO2 BLDA: 32 MMHG — LOW (ref 35–48)
PCO2 BLDA: 37 MMHG — SIGNIFICANT CHANGE UP (ref 35–48)
PH BLD: 7.46 — HIGH (ref 7.35–7.45)
PH BLDA: 7.07 PH — CRITICAL LOW (ref 7.35–7.45)
PH BLDA: 7.12 PH — CRITICAL LOW (ref 7.35–7.45)
PH BLDA: 7.19 PH — CRITICAL LOW (ref 7.35–7.45)
PH BLDA: 7.27 PH — LOW (ref 7.35–7.45)
PH BLDA: 7.53 PH — HIGH (ref 7.35–7.45)
PH UR: 5 — SIGNIFICANT CHANGE UP (ref 5–8)
PHOSPHATE SERPL-MCNC: 2 MG/DL — LOW (ref 2.5–4.5)
PHOSPHATE SERPL-MCNC: 2.1 MG/DL — LOW (ref 2.5–4.5)
PHOSPHATE SERPL-MCNC: 2.1 MG/DL — LOW (ref 2.5–4.5)
PHOSPHATE SERPL-MCNC: 2.2 MG/DL — LOW (ref 2.5–4.5)
PHOSPHATE SERPL-MCNC: 2.3 MG/DL — LOW (ref 2.5–4.5)
PHOSPHATE SERPL-MCNC: 2.4 MG/DL — LOW (ref 2.5–4.5)
PHOSPHATE SERPL-MCNC: 3.1 MG/DL — SIGNIFICANT CHANGE UP (ref 2.5–4.5)
PHOSPHATE SERPL-MCNC: 3.3 MG/DL — SIGNIFICANT CHANGE UP (ref 2.5–4.5)
PHOSPHATE SERPL-MCNC: 4 MG/DL — SIGNIFICANT CHANGE UP (ref 2.5–4.5)
PHOSPHATE SERPL-MCNC: 4.9 MG/DL — HIGH (ref 2.5–4.5)
PHOSPHATE SERPL-MCNC: 9.3 MG/DL — HIGH (ref 2.5–4.5)
PLAT MORPH BLD: NORMAL — SIGNIFICANT CHANGE UP
PLAT MORPH BLD: NORMAL — SIGNIFICANT CHANGE UP
PLATELET # BLD AUTO: 33 K/UL — LOW (ref 150–400)
PLATELET # BLD AUTO: 35 K/UL — LOW (ref 150–400)
PLATELET # BLD AUTO: 37 K/UL — LOW (ref 150–400)
PLATELET # BLD AUTO: 38 K/UL — LOW (ref 150–400)
PLATELET # BLD AUTO: 38 K/UL — LOW (ref 150–400)
PLATELET # BLD AUTO: 39 K/UL — LOW (ref 150–400)
PLATELET # BLD AUTO: 40 K/UL — LOW (ref 150–400)
PLATELET # BLD AUTO: 42 K/UL — LOW (ref 150–400)
PLATELET # BLD AUTO: 43 K/UL — LOW (ref 150–400)
PLATELET # BLD AUTO: 45 K/UL — LOW (ref 150–400)
PLATELET # BLD AUTO: 50 K/UL — LOW (ref 150–400)
PLATELET # BLD AUTO: 51 K/UL — LOW (ref 150–400)
PLATELET # BLD AUTO: 52 K/UL — LOW (ref 150–400)
PLATELET # BLD AUTO: 54 K/UL — LOW (ref 150–400)
PLATELET # BLD AUTO: 65 K/UL — LOW (ref 150–400)
PLATELET # BLD AUTO: 66 K/UL — LOW (ref 150–400)
PLATELET # BLD AUTO: 67 K/UL — LOW (ref 150–400)
PLATELET # BLD AUTO: 67 K/UL — LOW (ref 150–400)
PLATELET # BLD AUTO: 74 K/UL — LOW (ref 150–400)
PLATELET # BLD AUTO: 84 K/UL — LOW (ref 150–400)
PLATELET # BLD AUTO: 96 K/UL — LOW (ref 150–400)
PLATELET COUNT - ESTIMATE: SIGNIFICANT CHANGE UP
PMV BLD: 11.2 FL — SIGNIFICANT CHANGE UP (ref 7–13)
PMV BLD: 11.4 FL — SIGNIFICANT CHANGE UP (ref 7–13)
PMV BLD: 11.7 FL — SIGNIFICANT CHANGE UP (ref 7–13)
PMV BLD: 12.2 FL — SIGNIFICANT CHANGE UP (ref 7–13)
PMV BLD: SIGNIFICANT CHANGE UP FL (ref 7–13)
PMV BLD: SIGNIFICANT CHANGE UP FL (ref 7–13)
PO2 BLDA: 131 MMHG — HIGH (ref 83–108)
PO2 BLDA: 137 MMHG — HIGH (ref 83–108)
PO2 BLDA: 204 MMHG — HIGH (ref 83–108)
PO2 BLDA: 208 MMHG — HIGH (ref 83–108)
PO2 BLDA: 257 MMHG — HIGH (ref 83–108)
PO2 BLDA: 71 MMHG — LOW (ref 74–108)
POIKILOCYTOSIS BLD QL AUTO: SLIGHT — SIGNIFICANT CHANGE UP
POIKILOCYTOSIS BLD QL AUTO: SLIGHT — SIGNIFICANT CHANGE UP
POLYCHROMASIA BLD QL SMEAR: SLIGHT — SIGNIFICANT CHANGE UP
POLYCHROMASIA BLD QL SMEAR: SLIGHT — SIGNIFICANT CHANGE UP
POTASSIUM BLDA-SCNC: 3.4 MMOL/L — SIGNIFICANT CHANGE UP (ref 3.4–4.5)
POTASSIUM BLDA-SCNC: 3.4 MMOL/L — SIGNIFICANT CHANGE UP (ref 3.4–4.5)
POTASSIUM BLDA-SCNC: 5.1 MMOL/L — HIGH (ref 3.4–4.5)
POTASSIUM BLDA-SCNC: 5.3 MMOL/L — HIGH (ref 3.4–4.5)
POTASSIUM BLDA-SCNC: 5.9 MMOL/L — HIGH (ref 3.4–4.5)
POTASSIUM SERPL-MCNC: 3.1 MMOL/L — LOW (ref 3.5–5.3)
POTASSIUM SERPL-MCNC: 3.3 MMOL/L — LOW (ref 3.5–5.3)
POTASSIUM SERPL-MCNC: 3.4 MMOL/L — LOW (ref 3.5–5.3)
POTASSIUM SERPL-MCNC: 3.4 MMOL/L — LOW (ref 3.5–5.3)
POTASSIUM SERPL-MCNC: 3.7 MMOL/L — SIGNIFICANT CHANGE UP (ref 3.5–5.3)
POTASSIUM SERPL-MCNC: 3.8 MMOL/L — SIGNIFICANT CHANGE UP (ref 3.5–5.3)
POTASSIUM SERPL-MCNC: 4 MMOL/L — SIGNIFICANT CHANGE UP (ref 3.5–5.3)
POTASSIUM SERPL-MCNC: 4 MMOL/L — SIGNIFICANT CHANGE UP (ref 3.5–5.3)
POTASSIUM SERPL-MCNC: 4.1 MMOL/L — SIGNIFICANT CHANGE UP (ref 3.5–5.3)
POTASSIUM SERPL-MCNC: 4.2 MMOL/L — SIGNIFICANT CHANGE UP (ref 3.5–5.3)
POTASSIUM SERPL-MCNC: 4.3 MMOL/L — SIGNIFICANT CHANGE UP (ref 3.5–5.3)
POTASSIUM SERPL-MCNC: 4.4 MMOL/L — SIGNIFICANT CHANGE UP (ref 3.5–5.3)
POTASSIUM SERPL-MCNC: 7.1 MMOL/L — CRITICAL HIGH (ref 3.5–5.3)
POTASSIUM SERPL-SCNC: 3.1 MMOL/L — LOW (ref 3.5–5.3)
POTASSIUM SERPL-SCNC: 3.3 MMOL/L — LOW (ref 3.5–5.3)
POTASSIUM SERPL-SCNC: 3.4 MMOL/L — LOW (ref 3.5–5.3)
POTASSIUM SERPL-SCNC: 3.4 MMOL/L — LOW (ref 3.5–5.3)
POTASSIUM SERPL-SCNC: 3.7 MMOL/L — SIGNIFICANT CHANGE UP (ref 3.5–5.3)
POTASSIUM SERPL-SCNC: 3.8 MMOL/L — SIGNIFICANT CHANGE UP (ref 3.5–5.3)
POTASSIUM SERPL-SCNC: 4 MMOL/L — SIGNIFICANT CHANGE UP (ref 3.5–5.3)
POTASSIUM SERPL-SCNC: 4 MMOL/L — SIGNIFICANT CHANGE UP (ref 3.5–5.3)
POTASSIUM SERPL-SCNC: 4.1 MMOL/L — SIGNIFICANT CHANGE UP (ref 3.5–5.3)
POTASSIUM SERPL-SCNC: 4.2 MMOL/L — SIGNIFICANT CHANGE UP (ref 3.5–5.3)
POTASSIUM SERPL-SCNC: 4.3 MMOL/L — SIGNIFICANT CHANGE UP (ref 3.5–5.3)
POTASSIUM SERPL-SCNC: 4.4 MMOL/L — SIGNIFICANT CHANGE UP (ref 3.5–5.3)
POTASSIUM SERPL-SCNC: 7.1 MMOL/L — CRITICAL HIGH (ref 3.5–5.3)
PROMYELOCYTES # FLD: 0 % — SIGNIFICANT CHANGE UP (ref 0–0)
PROT PATTERN SERPL ELPH-IMP: SIGNIFICANT CHANGE UP
PROT SERPL-MCNC: 3.1 G/DL — LOW (ref 6–8.3)
PROT SERPL-MCNC: 3.5 G/DL — LOW (ref 6–8.3)
PROT SERPL-MCNC: 4.6 G/DL — LOW (ref 6–8.3)
PROT SERPL-MCNC: 5.3 G/DL — LOW (ref 6–8.3)
PROT SERPL-MCNC: 5.3 G/DL — LOW (ref 6–8.3)
PROT SERPL-MCNC: 5.5 GM/DL — LOW (ref 6–8.3)
PROT SERPL-MCNC: 6.3 GM/DL — SIGNIFICANT CHANGE UP (ref 6–8.3)
PROT UR-MCNC: NEGATIVE MG/DL — SIGNIFICANT CHANGE UP
PROTHROM AB SERPL-ACNC: 119.7 SEC — HIGH (ref 10.6–13.6)
PROTHROM AB SERPL-ACNC: 14.5 SEC — HIGH (ref 10.6–13.6)
PROTHROM AB SERPL-ACNC: 15.2 SEC — HIGH (ref 10.6–13.6)
PROTHROM AB SERPL-ACNC: 15.7 SEC — HIGH (ref 10.6–13.6)
RBC # BLD: 1.49 M/UL — LOW (ref 4.2–5.8)
RBC # BLD: 2.38 M/UL — LOW (ref 4.2–5.8)
RBC # BLD: 2.48 M/UL — LOW (ref 4.2–5.8)
RBC # BLD: 2.5 M/UL — LOW (ref 4.2–5.8)
RBC # BLD: 2.5 M/UL — LOW (ref 4.2–5.8)
RBC # BLD: 2.54 M/UL — LOW (ref 4.2–5.8)
RBC # BLD: 2.57 M/UL — LOW (ref 4.2–5.8)
RBC # BLD: 2.62 M/UL — LOW (ref 4.2–5.8)
RBC # BLD: 2.86 M/UL — LOW (ref 4.2–5.8)
RBC # BLD: 2.88 M/UL — LOW (ref 4.2–5.8)
RBC # BLD: 3.14 M/UL — LOW (ref 4.2–5.8)
RBC # BLD: 3.14 M/UL — LOW (ref 4.2–5.8)
RBC # BLD: 3.17 M/UL — LOW (ref 4.2–5.8)
RBC # BLD: 3.18 M/UL — LOW (ref 4.2–5.8)
RBC # BLD: 3.26 M/UL — LOW (ref 4.2–5.8)
RBC # BLD: 3.27 M/UL — LOW (ref 4.2–5.8)
RBC # BLD: 3.33 M/UL — LOW (ref 4.2–5.8)
RBC # BLD: 3.34 M/UL — LOW (ref 4.2–5.8)
RBC # BLD: 3.37 M/UL — LOW (ref 4.2–5.8)
RBC # BLD: 3.45 M/UL — LOW (ref 4.2–5.8)
RBC # BLD: 3.87 M/UL — LOW (ref 4.2–5.8)
RBC # FLD: 13.4 % — SIGNIFICANT CHANGE UP (ref 10.3–14.5)
RBC # FLD: 14.5 % — SIGNIFICANT CHANGE UP (ref 10.3–14.5)
RBC # FLD: 14.6 % — HIGH (ref 10.3–14.5)
RBC # FLD: 14.6 % — HIGH (ref 10.3–14.5)
RBC # FLD: 14.7 % — HIGH (ref 10.3–14.5)
RBC # FLD: 14.7 % — HIGH (ref 10.3–14.5)
RBC # FLD: 14.8 % — HIGH (ref 10.3–14.5)
RBC # FLD: 14.9 % — HIGH (ref 10.3–14.5)
RBC # FLD: 14.9 % — HIGH (ref 10.3–14.5)
RBC # FLD: 15.1 % — HIGH (ref 10.3–14.5)
RBC # FLD: 15.8 % — HIGH (ref 10.3–14.5)
RBC # FLD: 15.8 % — HIGH (ref 10.3–14.5)
RBC # FLD: 16.2 % — HIGH (ref 10.3–14.5)
RBC # FLD: 16.3 % — HIGH (ref 10.3–14.5)
RBC # FLD: 16.4 % — HIGH (ref 10.3–14.5)
RBC # FLD: 17.3 % — HIGH (ref 10.3–14.5)
RBC BLD AUTO: ABNORMAL
RBC BLD AUTO: SIGNIFICANT CHANGE UP
RETICS #: 18 K/UL — LOW (ref 25–125)
RETICS #: 84.2 K/UL — SIGNIFICANT CHANGE UP (ref 25–125)
RETICS/RBC NFR: 1.2 % — SIGNIFICANT CHANGE UP (ref 0.5–2.5)
RETICS/RBC NFR: 2.7 % — HIGH (ref 0.5–2.5)
RH IG SCN BLD-IMP: POSITIVE — SIGNIFICANT CHANGE UP
RH IG SCN BLD-IMP: POSITIVE — SIGNIFICANT CHANGE UP
SAO2 % BLDA: 95 % — SIGNIFICANT CHANGE UP (ref 92–96)
SAO2 % BLDA: 96 % — SIGNIFICANT CHANGE UP (ref 95–99)
SAO2 % BLDA: 98.3 % — SIGNIFICANT CHANGE UP (ref 95–99)
SAO2 % BLDA: 98.4 % — SIGNIFICANT CHANGE UP (ref 95–99)
SAO2 % BLDA: 98.7 % — SIGNIFICANT CHANGE UP (ref 95–99)
SAO2 % BLDA: 99 % — SIGNIFICANT CHANGE UP (ref 95–99)
SARS-COV-2 IGG SERPL QL IA: NEGATIVE — SIGNIFICANT CHANGE UP
SARS-COV-2 IGG SERPL QL IA: POSITIVE
SARS-COV-2 IGM SERPL IA-ACNC: 3.57 INDEX — HIGH
SARS-COV-2 IGM SERPL IA-ACNC: 4.23 AU/ML — SIGNIFICANT CHANGE UP
SARS-COV-2 RNA SPEC QL NAA+PROBE: SIGNIFICANT CHANGE UP
SCHISTOCYTES BLD QL AUTO: SLIGHT — SIGNIFICANT CHANGE UP
SMUDGE CELLS # BLD: PRESENT — SIGNIFICANT CHANGE UP
SODIUM BLDA-SCNC: 133 MMOL/L — LOW (ref 136–146)
SODIUM BLDA-SCNC: 136 MMOL/L — SIGNIFICANT CHANGE UP (ref 136–146)
SODIUM BLDA-SCNC: 138 MMOL/L — SIGNIFICANT CHANGE UP (ref 136–146)
SODIUM BLDA-SCNC: 139 MMOL/L — SIGNIFICANT CHANGE UP (ref 136–146)
SODIUM BLDA-SCNC: 140 MMOL/L — SIGNIFICANT CHANGE UP (ref 136–146)
SODIUM SERPL-SCNC: 137 MMOL/L — SIGNIFICANT CHANGE UP (ref 135–145)
SODIUM SERPL-SCNC: 138 MMOL/L — SIGNIFICANT CHANGE UP (ref 135–145)
SODIUM SERPL-SCNC: 139 MMOL/L — SIGNIFICANT CHANGE UP (ref 135–145)
SODIUM SERPL-SCNC: 140 MMOL/L — SIGNIFICANT CHANGE UP (ref 135–145)
SODIUM SERPL-SCNC: 141 MMOL/L — SIGNIFICANT CHANGE UP (ref 135–145)
SODIUM SERPL-SCNC: 142 MMOL/L — SIGNIFICANT CHANGE UP (ref 135–145)
SODIUM SERPL-SCNC: 142 MMOL/L — SIGNIFICANT CHANGE UP (ref 135–145)
SODIUM SERPL-SCNC: 143 MMOL/L — SIGNIFICANT CHANGE UP (ref 135–145)
SODIUM SERPL-SCNC: 144 MMOL/L — SIGNIFICANT CHANGE UP (ref 135–145)
SODIUM SERPL-SCNC: 147 MMOL/L — HIGH (ref 135–145)
SODIUM SERPL-SCNC: 148 MMOL/L — HIGH (ref 135–145)
SP GR SPEC: 1.01 — SIGNIFICANT CHANGE UP (ref 1.01–1.02)
SPECIMEN SOURCE: SIGNIFICANT CHANGE UP
SURGICAL PATHOLOGY STUDY: SIGNIFICANT CHANGE UP
TIBC SERPL-MCNC: 144 UG/DL — LOW (ref 220–430)
TM INTERPRETATION: SIGNIFICANT CHANGE UP
TROPONIN I SERPL-MCNC: 0.04 NG/ML — SIGNIFICANT CHANGE UP (ref 0.01–0.04)
TROPONIN I SERPL-MCNC: 0.05 NG/ML — HIGH (ref 0.01–0.04)
UIBC SERPL-MCNC: 118 UG/DL — SIGNIFICANT CHANGE UP (ref 110–370)
URATE SERPL-MCNC: 5.6 MG/DL — SIGNIFICANT CHANGE UP (ref 3.4–8.8)
URATE SERPL-MCNC: 6.7 MG/DL — SIGNIFICANT CHANGE UP (ref 3.4–8.8)
UROBILINOGEN FLD QL: NEGATIVE MG/DL — SIGNIFICANT CHANGE UP
VANCOMYCIN FLD-MCNC: 10.9 UG/ML — SIGNIFICANT CHANGE UP
VARIANT LYMPHS # BLD: 1.7 % — SIGNIFICANT CHANGE UP
VIT B12 SERPL-MCNC: 370 PG/ML — SIGNIFICANT CHANGE UP (ref 232–1245)
WBC # BLD: 10.62 K/UL — HIGH (ref 3.8–10.5)
WBC # BLD: 10.75 K/UL — HIGH (ref 3.8–10.5)
WBC # BLD: 10.89 K/UL — HIGH (ref 3.8–10.5)
WBC # BLD: 11.15 K/UL — HIGH (ref 3.8–10.5)
WBC # BLD: 12.17 K/UL — HIGH (ref 3.8–10.5)
WBC # BLD: 13.99 K/UL — HIGH (ref 3.8–10.5)
WBC # BLD: 155.45 K/UL — CRITICAL HIGH (ref 3.8–10.5)
WBC # BLD: 17.65 K/UL — HIGH (ref 3.8–10.5)
WBC # BLD: 179.1 K/UL — CRITICAL HIGH (ref 3.8–10.5)
WBC # BLD: 182.78 K/UL — CRITICAL HIGH (ref 3.8–10.5)
WBC # BLD: 20.83 K/UL — HIGH (ref 3.8–10.5)
WBC # BLD: 6.03 K/UL — SIGNIFICANT CHANGE UP (ref 3.8–10.5)
WBC # BLD: 6.45 K/UL — SIGNIFICANT CHANGE UP (ref 3.8–10.5)
WBC # BLD: 6.62 K/UL — SIGNIFICANT CHANGE UP (ref 3.8–10.5)
WBC # BLD: 6.74 K/UL — SIGNIFICANT CHANGE UP (ref 3.8–10.5)
WBC # BLD: 76.82 K/UL — CRITICAL HIGH (ref 3.8–10.5)
WBC # BLD: 8.44 K/UL — SIGNIFICANT CHANGE UP (ref 3.8–10.5)
WBC # BLD: 9.7 K/UL — SIGNIFICANT CHANGE UP (ref 3.8–10.5)
WBC # BLD: 95.76 K/UL — CRITICAL HIGH (ref 3.8–10.5)
WBC # FLD AUTO: 10.62 K/UL — HIGH (ref 3.8–10.5)
WBC # FLD AUTO: 10.75 K/UL — HIGH (ref 3.8–10.5)
WBC # FLD AUTO: 10.89 K/UL — HIGH (ref 3.8–10.5)
WBC # FLD AUTO: 11.15 K/UL — HIGH (ref 3.8–10.5)
WBC # FLD AUTO: 12.17 K/UL — HIGH (ref 3.8–10.5)
WBC # FLD AUTO: 13.99 K/UL — HIGH (ref 3.8–10.5)
WBC # FLD AUTO: 155.45 K/UL — CRITICAL HIGH (ref 3.8–10.5)
WBC # FLD AUTO: 17.65 K/UL — HIGH (ref 3.8–10.5)
WBC # FLD AUTO: 179.1 K/UL — CRITICAL HIGH (ref 3.8–10.5)
WBC # FLD AUTO: 182.78 K/UL — CRITICAL HIGH (ref 3.8–10.5)
WBC # FLD AUTO: 20.83 K/UL — HIGH (ref 3.8–10.5)
WBC # FLD AUTO: 6.03 K/UL — SIGNIFICANT CHANGE UP (ref 3.8–10.5)
WBC # FLD AUTO: 6.45 K/UL — SIGNIFICANT CHANGE UP (ref 3.8–10.5)
WBC # FLD AUTO: 6.62 K/UL — SIGNIFICANT CHANGE UP (ref 3.8–10.5)
WBC # FLD AUTO: 6.74 K/UL — SIGNIFICANT CHANGE UP (ref 3.8–10.5)
WBC # FLD AUTO: 76.82 K/UL — CRITICAL HIGH (ref 3.8–10.5)
WBC # FLD AUTO: 8.44 K/UL — SIGNIFICANT CHANGE UP (ref 3.8–10.5)
WBC # FLD AUTO: 9.7 K/UL — SIGNIFICANT CHANGE UP (ref 3.8–10.5)
WBC # FLD AUTO: 95.76 K/UL — CRITICAL HIGH (ref 3.8–10.5)

## 2020-01-01 PROCEDURE — 36680 INSERT NEEDLE BONE CAVITY: CPT | Mod: GC

## 2020-01-01 PROCEDURE — 88360 TUMOR IMMUNOHISTOCHEM/MANUAL: CPT | Mod: 26,59

## 2020-01-01 PROCEDURE — 70450 CT HEAD/BRAIN W/O DYE: CPT | Mod: 26

## 2020-01-01 PROCEDURE — 12345: CPT | Mod: NC,GC

## 2020-01-01 PROCEDURE — 99233 SBSQ HOSP IP/OBS HIGH 50: CPT

## 2020-01-01 PROCEDURE — 88313 SPECIAL STAINS GROUP 2: CPT | Mod: 26

## 2020-01-01 PROCEDURE — 71045 X-RAY EXAM CHEST 1 VIEW: CPT | Mod: 26

## 2020-01-01 PROCEDURE — 88342 IMHCHEM/IMCYTCHM 1ST ANTB: CPT | Mod: 26,59

## 2020-01-01 PROCEDURE — 99239 HOSP IP/OBS DSCHRG MGMT >30: CPT

## 2020-01-01 PROCEDURE — 99233 SBSQ HOSP IP/OBS HIGH 50: CPT | Mod: GC

## 2020-01-01 PROCEDURE — 85097 BONE MARROW INTERPRETATION: CPT

## 2020-01-01 PROCEDURE — 99232 SBSQ HOSP IP/OBS MODERATE 35: CPT | Mod: GC

## 2020-01-01 PROCEDURE — 71045 X-RAY EXAM CHEST 1 VIEW: CPT | Mod: 26,77

## 2020-01-01 PROCEDURE — 99223 1ST HOSP IP/OBS HIGH 75: CPT | Mod: GC

## 2020-01-01 PROCEDURE — 93010 ELECTROCARDIOGRAM REPORT: CPT

## 2020-01-01 PROCEDURE — 99291 CRITICAL CARE FIRST HOUR: CPT | Mod: 25

## 2020-01-01 PROCEDURE — 99232 SBSQ HOSP IP/OBS MODERATE 35: CPT

## 2020-01-01 PROCEDURE — 31500 INSERT EMERGENCY AIRWAY: CPT | Mod: GC

## 2020-01-01 PROCEDURE — 88364 INSITU HYBRIDIZATION (FISH): CPT | Mod: 26,59

## 2020-01-01 PROCEDURE — 71045 X-RAY EXAM CHEST 1 VIEW: CPT | Mod: 26,76

## 2020-01-01 PROCEDURE — 88305 TISSUE EXAM BY PATHOLOGIST: CPT | Mod: 26

## 2020-01-01 PROCEDURE — 88312 SPECIAL STAINS GROUP 1: CPT | Mod: 26

## 2020-01-01 PROCEDURE — 88341 IMHCHEM/IMCYTCHM EA ADD ANTB: CPT | Mod: 26,59

## 2020-01-01 PROCEDURE — 88189 FLOWCYTOMETRY/READ 16 & >: CPT

## 2020-01-01 PROCEDURE — 77012 CT SCAN FOR NEEDLE BIOPSY: CPT | Mod: 26

## 2020-01-01 PROCEDURE — 99284 EMERGENCY DEPT VISIT MOD MDM: CPT

## 2020-01-01 PROCEDURE — 36620 INSERTION CATHETER ARTERY: CPT | Mod: GC

## 2020-01-01 PROCEDURE — 43753 TX GASTRO INTUB W/ASP: CPT | Mod: 59,GC

## 2020-01-01 PROCEDURE — 99221 1ST HOSP IP/OBS SF/LOW 40: CPT

## 2020-01-01 PROCEDURE — 74176 CT ABD & PELVIS W/O CONTRAST: CPT | Mod: 26

## 2020-01-01 PROCEDURE — 38222 DX BONE MARROW BX & ASPIR: CPT | Mod: RT

## 2020-01-01 PROCEDURE — 88365 INSITU HYBRIDIZATION (FISH): CPT | Mod: 26,59

## 2020-01-01 PROCEDURE — 99285 EMERGENCY DEPT VISIT HI MDM: CPT | Mod: CS

## 2020-01-01 PROCEDURE — 36556 INSERT NON-TUNNEL CV CATH: CPT | Mod: GC

## 2020-01-01 PROCEDURE — 85060 BLOOD SMEAR INTERPRETATION: CPT

## 2020-01-01 PROCEDURE — 93306 TTE W/DOPPLER COMPLETE: CPT | Mod: 26

## 2020-01-01 PROCEDURE — 99223 1ST HOSP IP/OBS HIGH 75: CPT | Mod: AI

## 2020-01-01 RX ORDER — PREGABALIN 225 MG/1
1000 CAPSULE ORAL ONCE
Refills: 0 | Status: COMPLETED | OUTPATIENT
Start: 2020-01-01 | End: 2020-01-01

## 2020-01-01 RX ORDER — PANTOPRAZOLE SODIUM 20 MG/1
80 TABLET, DELAYED RELEASE ORAL ONCE
Refills: 0 | Status: COMPLETED | OUTPATIENT
Start: 2020-01-01 | End: 2020-01-01

## 2020-01-01 RX ORDER — ACETAMINOPHEN 500 MG
650 TABLET ORAL ONCE
Refills: 0 | Status: COMPLETED | OUTPATIENT
Start: 2020-01-01 | End: 2020-01-01

## 2020-01-01 RX ORDER — VANCOMYCIN HCL 1 G
1000 VIAL (EA) INTRAVENOUS EVERY 12 HOURS
Refills: 0 | Status: DISCONTINUED | OUTPATIENT
Start: 2020-01-01 | End: 2020-01-01

## 2020-01-01 RX ORDER — SODIUM BICARBONATE 1 MEQ/ML
50 SYRINGE (ML) INTRAVENOUS ONCE
Refills: 0 | Status: COMPLETED | OUTPATIENT
Start: 2020-01-01 | End: 2020-01-01

## 2020-01-01 RX ORDER — SUCRALFATE 1 G
1 TABLET ORAL EVERY 6 HOURS
Refills: 0 | Status: DISCONTINUED | OUTPATIENT
Start: 2020-01-01 | End: 2020-01-01

## 2020-01-01 RX ORDER — ERGOCALCIFEROL 1.25 MG/1
1 CAPSULE ORAL
Qty: 0 | Refills: 0 | DISCHARGE

## 2020-01-01 RX ORDER — PIPERACILLIN AND TAZOBACTAM 4; .5 G/20ML; G/20ML
3.38 INJECTION, POWDER, LYOPHILIZED, FOR SOLUTION INTRAVENOUS EVERY 12 HOURS
Refills: 0 | Status: DISCONTINUED | OUTPATIENT
Start: 2020-01-01 | End: 2020-01-01

## 2020-01-01 RX ORDER — ASCORBIC ACID 60 MG
1 TABLET,CHEWABLE ORAL
Qty: 0 | Refills: 0 | DISCHARGE

## 2020-01-01 RX ORDER — PANTOPRAZOLE SODIUM 20 MG/1
40 TABLET, DELAYED RELEASE ORAL EVERY 12 HOURS
Refills: 0 | Status: DISCONTINUED | OUTPATIENT
Start: 2020-01-01 | End: 2020-01-01

## 2020-01-01 RX ORDER — FOLIC ACID 0.8 MG
1 TABLET ORAL
Qty: 0 | Refills: 0 | DISCHARGE
Start: 2020-01-01

## 2020-01-01 RX ORDER — SODIUM CHLORIDE 9 MG/ML
1000 INJECTION, SOLUTION INTRAVENOUS
Refills: 0 | Status: DISCONTINUED | OUTPATIENT
Start: 2020-01-01 | End: 2020-01-01

## 2020-01-01 RX ORDER — INSULIN LISPRO 100/ML
0 VIAL (ML) SUBCUTANEOUS
Qty: 0 | Refills: 0 | DISCHARGE

## 2020-01-01 RX ORDER — POTASSIUM CHLORIDE 20 MEQ
10 PACKET (EA) ORAL
Refills: 0 | Status: COMPLETED | OUTPATIENT
Start: 2020-01-01 | End: 2020-01-01

## 2020-01-01 RX ORDER — PHENYLEPHRINE HYDROCHLORIDE 10 MG/ML
0.1 INJECTION INTRAVENOUS
Qty: 40 | Refills: 0 | Status: DISCONTINUED | OUTPATIENT
Start: 2020-01-01 | End: 2020-01-01

## 2020-01-01 RX ORDER — FOLIC ACID 0.8 MG
1 TABLET ORAL
Qty: 0 | Refills: 0 | DISCHARGE

## 2020-01-01 RX ORDER — HYDROXYUREA 500 MG/1
500 CAPSULE ORAL THREE TIMES A DAY
Refills: 0 | Status: DISCONTINUED | OUTPATIENT
Start: 2020-01-01 | End: 2020-01-01

## 2020-01-01 RX ORDER — POTASSIUM PHOSPHATE, MONOBASIC POTASSIUM PHOSPHATE, DIBASIC 236; 224 MG/ML; MG/ML
15 INJECTION, SOLUTION INTRAVENOUS ONCE
Refills: 0 | Status: COMPLETED | OUTPATIENT
Start: 2020-01-01 | End: 2020-01-01

## 2020-01-01 RX ORDER — METRONIDAZOLE 500 MG
TABLET ORAL
Refills: 0 | Status: DISCONTINUED | OUTPATIENT
Start: 2020-01-01 | End: 2020-01-01

## 2020-01-01 RX ORDER — ZINC SULFATE TAB 220 MG (50 MG ZINC EQUIVALENT) 220 (50 ZN) MG
1 TAB ORAL
Qty: 0 | Refills: 0 | DISCHARGE

## 2020-01-01 RX ORDER — MIDAZOLAM HYDROCHLORIDE 1 MG/ML
6 INJECTION, SOLUTION INTRAMUSCULAR; INTRAVENOUS ONCE
Refills: 0 | Status: DISCONTINUED | OUTPATIENT
Start: 2020-01-01 | End: 2020-01-01

## 2020-01-01 RX ORDER — DEXAMETHASONE 0.5 MG/5ML
40 ELIXIR ORAL
Qty: 0 | Refills: 0 | DISCHARGE
Start: 2020-01-01 | End: 2020-01-01

## 2020-01-01 RX ORDER — CARVEDILOL PHOSPHATE 80 MG/1
1 CAPSULE, EXTENDED RELEASE ORAL
Qty: 0 | Refills: 0 | DISCHARGE

## 2020-01-01 RX ORDER — SUCRALFATE 1 G
10 TABLET ORAL
Qty: 0 | Refills: 0 | DISCHARGE
Start: 2020-01-01

## 2020-01-01 RX ORDER — SUCRALFATE 1 G
1 TABLET ORAL
Refills: 0 | Status: DISCONTINUED | OUTPATIENT
Start: 2020-01-01 | End: 2020-01-01

## 2020-01-01 RX ORDER — HYDROCORTISONE 20 MG
60 TABLET ORAL
Qty: 0 | Refills: 0 | DISCHARGE

## 2020-01-01 RX ORDER — PROPOFOL 10 MG/ML
5 INJECTION, EMULSION INTRAVENOUS ONCE
Refills: 0 | Status: COMPLETED | OUTPATIENT
Start: 2020-01-01 | End: 2020-01-01

## 2020-01-01 RX ORDER — SUCRALFATE 1 G
1 TABLET ORAL
Qty: 0 | Refills: 0 | DISCHARGE

## 2020-01-01 RX ORDER — SODIUM CHLORIDE 9 MG/ML
2400 INJECTION INTRAMUSCULAR; INTRAVENOUS; SUBCUTANEOUS ONCE
Refills: 0 | Status: COMPLETED | OUTPATIENT
Start: 2020-01-01 | End: 2020-01-01

## 2020-01-01 RX ORDER — AMLODIPINE BESYLATE 2.5 MG/1
10 TABLET ORAL DAILY
Refills: 0 | Status: DISCONTINUED | OUTPATIENT
Start: 2020-01-01 | End: 2020-01-01

## 2020-01-01 RX ORDER — LISINOPRIL 2.5 MG/1
10 TABLET ORAL DAILY
Refills: 0 | Status: DISCONTINUED | OUTPATIENT
Start: 2020-01-01 | End: 2020-01-01

## 2020-01-01 RX ORDER — NOREPINEPHRINE BITARTRATE/D5W 8 MG/250ML
0.05 PLASTIC BAG, INJECTION (ML) INTRAVENOUS
Qty: 16 | Refills: 0 | Status: DISCONTINUED | OUTPATIENT
Start: 2020-01-01 | End: 2020-01-01

## 2020-01-01 RX ORDER — SODIUM,POTASSIUM PHOSPHATES 278-250MG
1 POWDER IN PACKET (EA) ORAL ONCE
Refills: 0 | Status: COMPLETED | OUTPATIENT
Start: 2020-01-01 | End: 2020-01-01

## 2020-01-01 RX ORDER — VANCOMYCIN HCL 1 G
VIAL (EA) INTRAVENOUS
Refills: 0 | Status: DISCONTINUED | OUTPATIENT
Start: 2020-01-01 | End: 2020-01-01

## 2020-01-01 RX ORDER — CEFTRIAXONE 500 MG/1
INJECTION, POWDER, FOR SOLUTION INTRAMUSCULAR; INTRAVENOUS
Refills: 0 | Status: COMPLETED | OUTPATIENT
Start: 2020-01-01 | End: 2020-01-01

## 2020-01-01 RX ORDER — INSULIN HUMAN 100 [IU]/ML
10 INJECTION, SOLUTION SUBCUTANEOUS ONCE
Refills: 0 | Status: COMPLETED | OUTPATIENT
Start: 2020-01-01 | End: 2020-01-01

## 2020-01-01 RX ORDER — CARVEDILOL PHOSPHATE 80 MG/1
25 CAPSULE, EXTENDED RELEASE ORAL EVERY 12 HOURS
Refills: 0 | Status: DISCONTINUED | OUTPATIENT
Start: 2020-01-01 | End: 2020-01-01

## 2020-01-01 RX ORDER — PIPERACILLIN AND TAZOBACTAM 4; .5 G/20ML; G/20ML
3.38 INJECTION, POWDER, LYOPHILIZED, FOR SOLUTION INTRAVENOUS EVERY 8 HOURS
Refills: 0 | Status: DISCONTINUED | OUTPATIENT
Start: 2020-01-01 | End: 2020-01-01

## 2020-01-01 RX ORDER — POTASSIUM CHLORIDE 20 MEQ
40 PACKET (EA) ORAL ONCE
Refills: 0 | Status: COMPLETED | OUTPATIENT
Start: 2020-01-01 | End: 2020-01-01

## 2020-01-01 RX ORDER — MIDAZOLAM HYDROCHLORIDE 1 MG/ML
2 INJECTION, SOLUTION INTRAMUSCULAR; INTRAVENOUS ONCE
Refills: 0 | Status: DISCONTINUED | OUTPATIENT
Start: 2020-01-01 | End: 2020-01-01

## 2020-01-01 RX ORDER — LISINOPRIL 2.5 MG/1
20 TABLET ORAL DAILY
Refills: 0 | Status: DISCONTINUED | OUTPATIENT
Start: 2020-01-01 | End: 2020-01-01

## 2020-01-01 RX ORDER — ACETAMINOPHEN 500 MG
1000 TABLET ORAL ONCE
Refills: 0 | Status: DISCONTINUED | OUTPATIENT
Start: 2020-01-01 | End: 2020-01-01

## 2020-01-01 RX ORDER — PHENYLEPHRINE HYDROCHLORIDE 10 MG/ML
0.1 INJECTION INTRAVENOUS
Qty: 160 | Refills: 0 | Status: DISCONTINUED | OUTPATIENT
Start: 2020-01-01 | End: 2020-01-01

## 2020-01-01 RX ORDER — SOD SULF/SODIUM/NAHCO3/KCL/PEG
4000 SOLUTION, RECONSTITUTED, ORAL ORAL ONCE
Refills: 0 | Status: COMPLETED | OUTPATIENT
Start: 2020-01-01 | End: 2020-01-01

## 2020-01-01 RX ORDER — SODIUM CHLORIDE 9 MG/ML
10 INJECTION INTRAMUSCULAR; INTRAVENOUS; SUBCUTANEOUS
Refills: 0 | Status: DISCONTINUED | OUTPATIENT
Start: 2020-01-01 | End: 2020-01-01

## 2020-01-01 RX ORDER — ACETAMINOPHEN 500 MG
975 TABLET ORAL ONCE
Refills: 0 | Status: COMPLETED | OUTPATIENT
Start: 2020-01-01 | End: 2020-01-01

## 2020-01-01 RX ORDER — PANTOPRAZOLE SODIUM 20 MG/1
40 TABLET, DELAYED RELEASE ORAL
Refills: 0 | Status: DISCONTINUED | OUTPATIENT
Start: 2020-01-01 | End: 2020-01-01

## 2020-01-01 RX ORDER — LANOLIN ALCOHOL/MO/W.PET/CERES
3 CREAM (GRAM) TOPICAL AT BEDTIME
Refills: 0 | Status: DISCONTINUED | OUTPATIENT
Start: 2020-01-01 | End: 2020-01-01

## 2020-01-01 RX ORDER — PREGABALIN 225 MG/1
1000 CAPSULE ORAL DAILY
Refills: 0 | Status: COMPLETED | OUTPATIENT
Start: 2020-01-01 | End: 2020-01-01

## 2020-01-01 RX ORDER — SODIUM CHLORIDE 9 MG/ML
60 INJECTION INTRAMUSCULAR; INTRAVENOUS; SUBCUTANEOUS
Qty: 0 | Refills: 0 | DISCHARGE
Start: 2020-01-01

## 2020-01-01 RX ORDER — PANTOPRAZOLE SODIUM 20 MG/1
1 TABLET, DELAYED RELEASE ORAL
Qty: 0 | Refills: 0 | DISCHARGE

## 2020-01-01 RX ORDER — MULTIVIT WITH MIN/MFOLATE/K2 340-15/3 G
1 POWDER (GRAM) ORAL ONCE
Refills: 0 | Status: COMPLETED | OUTPATIENT
Start: 2020-01-01 | End: 2020-01-01

## 2020-01-01 RX ORDER — FOLIC ACID 0.8 MG
1 TABLET ORAL DAILY
Refills: 0 | Status: DISCONTINUED | OUTPATIENT
Start: 2020-01-01 | End: 2020-01-01

## 2020-01-01 RX ORDER — AMLODIPINE BESYLATE 2.5 MG/1
5 TABLET ORAL DAILY
Refills: 0 | Status: DISCONTINUED | OUTPATIENT
Start: 2020-01-01 | End: 2020-01-01

## 2020-01-01 RX ORDER — ZINC OXIDE 200 MG/G
1 OINTMENT TOPICAL
Qty: 0 | Refills: 0 | DISCHARGE
Start: 2020-01-01

## 2020-01-01 RX ORDER — PREGABALIN 225 MG/1
1 CAPSULE ORAL
Qty: 30 | Refills: 0
Start: 2020-01-01 | End: 2020-10-29

## 2020-01-01 RX ORDER — ERGOCALCIFEROL 1.25 MG/1
1 CAPSULE ORAL
Qty: 7 | Refills: 0
Start: 2020-01-01 | End: 2020-11-16

## 2020-01-01 RX ORDER — LISINOPRIL 2.5 MG/1
1 TABLET ORAL
Qty: 0 | Refills: 0 | DISCHARGE

## 2020-01-01 RX ORDER — ACETAMINOPHEN 500 MG
1000 TABLET ORAL ONCE
Refills: 0 | Status: COMPLETED | OUTPATIENT
Start: 2020-01-01 | End: 2020-01-01

## 2020-01-01 RX ORDER — METRONIDAZOLE 500 MG
500 TABLET ORAL ONCE
Refills: 0 | Status: COMPLETED | OUTPATIENT
Start: 2020-01-01 | End: 2020-01-01

## 2020-01-01 RX ORDER — DEXAMETHASONE 0.5 MG/5ML
40 ELIXIR ORAL ONCE
Refills: 0 | Status: COMPLETED | OUTPATIENT
Start: 2020-01-01 | End: 2020-01-01

## 2020-01-01 RX ORDER — ZINC OXIDE 200 MG/G
1 OINTMENT TOPICAL
Refills: 0 | Status: DISCONTINUED | OUTPATIENT
Start: 2020-01-01 | End: 2020-01-01

## 2020-01-01 RX ORDER — HYDROCORTISONE 20 MG
100 TABLET ORAL AT BEDTIME
Refills: 0 | Status: DISCONTINUED | OUTPATIENT
Start: 2020-01-01 | End: 2020-01-01

## 2020-01-01 RX ORDER — CEFTRIAXONE 500 MG/1
1000 INJECTION, POWDER, FOR SOLUTION INTRAMUSCULAR; INTRAVENOUS ONCE
Refills: 0 | Status: COMPLETED | OUTPATIENT
Start: 2020-01-01 | End: 2020-01-01

## 2020-01-01 RX ORDER — DEXAMETHASONE 0.5 MG/5ML
1 ELIXIR ORAL
Qty: 0 | Refills: 0 | DISCHARGE
Start: 2020-01-01

## 2020-01-01 RX ORDER — DEXAMETHASONE 0.5 MG/5ML
6 ELIXIR ORAL
Qty: 12 | Refills: 0
Start: 2020-01-01 | End: 2020-01-01

## 2020-01-01 RX ORDER — AMLODIPINE BESYLATE 2.5 MG/1
1 TABLET ORAL
Qty: 0 | Refills: 0 | DISCHARGE

## 2020-01-01 RX ORDER — SODIUM CHLORIDE 9 MG/ML
1000 INJECTION, SOLUTION INTRAVENOUS ONCE
Refills: 0 | Status: COMPLETED | OUTPATIENT
Start: 2020-01-01 | End: 2020-01-01

## 2020-01-01 RX ORDER — PREGABALIN 225 MG/1
1000 CAPSULE ORAL DAILY
Refills: 0 | Status: DISCONTINUED | OUTPATIENT
Start: 2020-01-01 | End: 2020-01-01

## 2020-01-01 RX ORDER — NIFEDIPINE 30 MG
1 TABLET, EXTENDED RELEASE 24 HR ORAL
Qty: 0 | Refills: 0 | DISCHARGE

## 2020-01-01 RX ORDER — DEXTROSE 50 % IN WATER 50 %
50 SYRINGE (ML) INTRAVENOUS ONCE
Refills: 0 | Status: DISCONTINUED | OUTPATIENT
Start: 2020-01-01 | End: 2020-01-01

## 2020-01-01 RX ORDER — ZINC SULFATE TAB 220 MG (50 MG ZINC EQUIVALENT) 220 (50 ZN) MG
1 TAB ORAL
Qty: 0 | Refills: 0 | DISCHARGE
Start: 2020-01-01

## 2020-01-01 RX ORDER — FENTANYL CITRATE 50 UG/ML
100 INJECTION INTRAVENOUS ONCE
Refills: 0 | Status: DISCONTINUED | OUTPATIENT
Start: 2020-01-01 | End: 2020-01-01

## 2020-01-01 RX ORDER — VASOPRESSIN 20 [USP'U]/ML
0.04 INJECTION INTRAVENOUS
Qty: 50 | Refills: 0 | Status: DISCONTINUED | OUTPATIENT
Start: 2020-01-01 | End: 2020-01-01

## 2020-01-01 RX ORDER — PROPOFOL 10 MG/ML
50 INJECTION, EMULSION INTRAVENOUS ONCE
Refills: 0 | Status: COMPLETED | OUTPATIENT
Start: 2020-01-01 | End: 2020-01-01

## 2020-01-01 RX ORDER — SODIUM CHLORIDE 9 MG/ML
1000 INJECTION, SOLUTION INTRAVENOUS
Refills: 0 | Status: COMPLETED | OUTPATIENT
Start: 2020-01-01 | End: 2020-01-01

## 2020-01-01 RX ORDER — CEFTRIAXONE 500 MG/1
1000 INJECTION, POWDER, FOR SOLUTION INTRAMUSCULAR; INTRAVENOUS EVERY 24 HOURS
Refills: 0 | Status: COMPLETED | OUTPATIENT
Start: 2020-01-01 | End: 2020-01-01

## 2020-01-01 RX ORDER — DEXTROSE 50 % IN WATER 50 %
50 SYRINGE (ML) INTRAVENOUS ONCE
Refills: 0 | Status: COMPLETED | OUTPATIENT
Start: 2020-01-01 | End: 2020-01-01

## 2020-01-01 RX ORDER — DEXAMETHASONE 0.5 MG/5ML
40 ELIXIR ORAL DAILY
Refills: 0 | Status: DISCONTINUED | OUTPATIENT
Start: 2020-01-01 | End: 2020-01-01

## 2020-01-01 RX ORDER — ACETAMINOPHEN 500 MG
2 TABLET ORAL
Qty: 0 | Refills: 0 | DISCHARGE

## 2020-01-01 RX ORDER — PROPOFOL 10 MG/ML
10 INJECTION, EMULSION INTRAVENOUS
Qty: 1000 | Refills: 0 | Status: DISCONTINUED | OUTPATIENT
Start: 2020-01-01 | End: 2020-01-01

## 2020-01-01 RX ORDER — NOREPINEPHRINE BITARTRATE/D5W 8 MG/250ML
0.05 PLASTIC BAG, INJECTION (ML) INTRAVENOUS
Qty: 8 | Refills: 0 | Status: DISCONTINUED | OUTPATIENT
Start: 2020-01-01 | End: 2020-01-01

## 2020-01-01 RX ORDER — KETAMINE HYDROCHLORIDE 100 MG/ML
100 INJECTION INTRAMUSCULAR; INTRAVENOUS ONCE
Refills: 0 | Status: DISCONTINUED | OUTPATIENT
Start: 2020-01-01 | End: 2020-01-01

## 2020-01-01 RX ORDER — PIPERACILLIN AND TAZOBACTAM 4; .5 G/20ML; G/20ML
3.38 INJECTION, POWDER, LYOPHILIZED, FOR SOLUTION INTRAVENOUS ONCE
Refills: 0 | Status: COMPLETED | OUTPATIENT
Start: 2020-01-01 | End: 2020-01-01

## 2020-01-01 RX ORDER — INFLUENZA VIRUS VACCINE 15; 15; 15; 15 UG/.5ML; UG/.5ML; UG/.5ML; UG/.5ML
0.5 SUSPENSION INTRAMUSCULAR ONCE
Refills: 0 | Status: DISCONTINUED | OUTPATIENT
Start: 2020-01-01 | End: 2020-01-01

## 2020-01-01 RX ORDER — ACETAMINOPHEN 500 MG
650 TABLET ORAL EVERY 6 HOURS
Refills: 0 | Status: DISCONTINUED | OUTPATIENT
Start: 2020-01-01 | End: 2020-01-01

## 2020-01-01 RX ORDER — ERGOCALCIFEROL 1.25 MG/1
1 CAPSULE ORAL
Qty: 7 | Refills: 0
Start: 2020-01-01 | End: 2020-11-15

## 2020-01-01 RX ORDER — AMLODIPINE BESYLATE 2.5 MG/1
1 TABLET ORAL
Qty: 0 | Refills: 0 | DISCHARGE
Start: 2020-01-01

## 2020-01-01 RX ORDER — METRONIDAZOLE 500 MG
500 TABLET ORAL EVERY 8 HOURS
Refills: 0 | Status: DISCONTINUED | OUTPATIENT
Start: 2020-01-01 | End: 2020-01-01

## 2020-01-01 RX ORDER — HYDROCORTISONE 20 MG
60 TABLET ORAL
Qty: 0 | Refills: 0 | DISCHARGE
Start: 2020-01-01 | End: 2020-01-01

## 2020-01-01 RX ORDER — KETAMINE HYDROCHLORIDE 100 MG/ML
0.25 INJECTION INTRAMUSCULAR; INTRAVENOUS
Qty: 1000 | Refills: 0 | Status: DISCONTINUED | OUTPATIENT
Start: 2020-01-01 | End: 2020-01-01

## 2020-01-01 RX ORDER — PREGABALIN 225 MG/1
1 CAPSULE ORAL
Qty: 0 | Refills: 0 | DISCHARGE

## 2020-01-01 RX ORDER — CHLORHEXIDINE GLUCONATE 213 G/1000ML
1 SOLUTION TOPICAL
Refills: 0 | Status: DISCONTINUED | OUTPATIENT
Start: 2020-01-01 | End: 2020-01-01

## 2020-01-01 RX ORDER — IPRATROPIUM/ALBUTEROL SULFATE 18-103MCG
3 AEROSOL WITH ADAPTER (GRAM) INHALATION ONCE
Refills: 0 | Status: COMPLETED | OUTPATIENT
Start: 2020-01-01 | End: 2020-01-01

## 2020-01-01 RX ORDER — POTASSIUM PHOSPHATE, MONOBASIC POTASSIUM PHOSPHATE, DIBASIC 236; 224 MG/ML; MG/ML
30 INJECTION, SOLUTION INTRAVENOUS ONCE
Refills: 0 | Status: COMPLETED | OUTPATIENT
Start: 2020-01-01 | End: 2020-01-01

## 2020-01-01 RX ORDER — ERGOCALCIFEROL 1.25 MG/1
50000 CAPSULE ORAL
Refills: 0 | Status: DISCONTINUED | OUTPATIENT
Start: 2020-01-01 | End: 2020-01-01

## 2020-01-01 RX ORDER — PANTOPRAZOLE SODIUM 20 MG/1
1 TABLET, DELAYED RELEASE ORAL
Qty: 0 | Refills: 0 | DISCHARGE
Start: 2020-01-01

## 2020-01-01 RX ORDER — FENTANYL CITRATE 50 UG/ML
25 INJECTION INTRAVENOUS ONCE
Refills: 0 | Status: DISCONTINUED | OUTPATIENT
Start: 2020-01-01 | End: 2020-01-01

## 2020-01-01 RX ORDER — ZINC SULFATE TAB 220 MG (50 MG ZINC EQUIVALENT) 220 (50 ZN) MG
220 TAB ORAL DAILY
Refills: 0 | Status: DISCONTINUED | OUTPATIENT
Start: 2020-01-01 | End: 2020-01-01

## 2020-01-01 RX ORDER — SODIUM BICARBONATE 1 MEQ/ML
0.22 SYRINGE (ML) INTRAVENOUS
Qty: 150 | Refills: 0 | Status: DISCONTINUED | OUTPATIENT
Start: 2020-01-01 | End: 2020-01-01

## 2020-01-01 RX ORDER — ASCORBIC ACID 60 MG
500 TABLET,CHEWABLE ORAL DAILY
Refills: 0 | Status: DISCONTINUED | OUTPATIENT
Start: 2020-01-01 | End: 2020-01-01

## 2020-01-01 RX ORDER — VANCOMYCIN HCL 1 G
1000 VIAL (EA) INTRAVENOUS ONCE
Refills: 0 | Status: COMPLETED | OUTPATIENT
Start: 2020-01-01 | End: 2020-01-01

## 2020-01-01 RX ORDER — CHLORHEXIDINE GLUCONATE 213 G/1000ML
15 SOLUTION TOPICAL EVERY 12 HOURS
Refills: 0 | Status: DISCONTINUED | OUTPATIENT
Start: 2020-01-01 | End: 2020-01-01

## 2020-01-01 RX ORDER — NITROGLYCERIN 6.5 MG
1 CAPSULE, EXTENDED RELEASE ORAL ONCE
Refills: 0 | Status: COMPLETED | OUTPATIENT
Start: 2020-01-01 | End: 2020-01-01

## 2020-01-01 RX ORDER — AMLODIPINE BESYLATE 2.5 MG/1
5 TABLET ORAL ONCE
Refills: 0 | Status: COMPLETED | OUTPATIENT
Start: 2020-01-01 | End: 2020-01-01

## 2020-01-01 RX ORDER — PROPOFOL 10 MG/ML
10 INJECTION, EMULSION INTRAVENOUS
Qty: 500 | Refills: 0 | Status: DISCONTINUED | OUTPATIENT
Start: 2020-01-01 | End: 2020-01-01

## 2020-01-01 RX ORDER — PANTOPRAZOLE SODIUM 20 MG/1
8 TABLET, DELAYED RELEASE ORAL
Qty: 80 | Refills: 0 | Status: DISCONTINUED | OUTPATIENT
Start: 2020-01-01 | End: 2020-01-01

## 2020-01-01 RX ADMIN — CEFTRIAXONE 1000 MILLIGRAM(S): 500 INJECTION, POWDER, FOR SOLUTION INTRAMUSCULAR; INTRAVENOUS at 12:00

## 2020-01-01 RX ADMIN — Medication 650 MILLIGRAM(S): at 09:38

## 2020-01-01 RX ADMIN — Medication 100 MILLIGRAM(S): at 22:13

## 2020-01-01 RX ADMIN — Medication 1 GRAM(S): at 17:34

## 2020-01-01 RX ADMIN — FENTANYL CITRATE 100 MICROGRAM(S): 50 INJECTION INTRAVENOUS at 14:52

## 2020-01-01 RX ADMIN — Medication 250 MILLIGRAM(S): at 17:42

## 2020-01-01 RX ADMIN — Medication 1 GRAM(S): at 05:05

## 2020-01-01 RX ADMIN — Medication 1 GRAM(S): at 17:18

## 2020-01-01 RX ADMIN — Medication 100 MILLIGRAM(S): at 06:13

## 2020-01-01 RX ADMIN — Medication 10 MILLIGRAM(S): at 21:39

## 2020-01-01 RX ADMIN — Medication 1 GRAM(S): at 06:11

## 2020-01-01 RX ADMIN — Medication 1 MILLIGRAM(S): at 11:36

## 2020-01-01 RX ADMIN — Medication 4000 MILLILITER(S): at 13:59

## 2020-01-01 RX ADMIN — Medication 100 MILLIGRAM(S): at 22:33

## 2020-01-01 RX ADMIN — Medication 1 GRAM(S): at 17:09

## 2020-01-01 RX ADMIN — Medication 1 GRAM(S): at 23:19

## 2020-01-01 RX ADMIN — LISINOPRIL 10 MILLIGRAM(S): 2.5 TABLET ORAL at 17:18

## 2020-01-01 RX ADMIN — Medication 1 GRAM(S): at 11:42

## 2020-01-01 RX ADMIN — Medication 1 GRAM(S): at 18:23

## 2020-01-01 RX ADMIN — ZINC OXIDE 1 APPLICATION(S): 200 OINTMENT TOPICAL at 18:03

## 2020-01-01 RX ADMIN — CARVEDILOL PHOSPHATE 25 MILLIGRAM(S): 80 CAPSULE, EXTENDED RELEASE ORAL at 17:18

## 2020-01-01 RX ADMIN — Medication 1 GRAM(S): at 17:51

## 2020-01-01 RX ADMIN — Medication 1 GRAM(S): at 23:57

## 2020-01-01 RX ADMIN — Medication 1000 MILLIGRAM(S): at 10:07

## 2020-01-01 RX ADMIN — POTASSIUM PHOSPHATE, MONOBASIC POTASSIUM PHOSPHATE, DIBASIC 62.5 MILLIMOLE(S): 236; 224 INJECTION, SOLUTION INTRAVENOUS at 10:26

## 2020-01-01 RX ADMIN — AMLODIPINE BESYLATE 10 MILLIGRAM(S): 2.5 TABLET ORAL at 06:50

## 2020-01-01 RX ADMIN — PIPERACILLIN AND TAZOBACTAM 25 GRAM(S): 4; .5 INJECTION, POWDER, LYOPHILIZED, FOR SOLUTION INTRAVENOUS at 16:04

## 2020-01-01 RX ADMIN — Medication 1 GRAM(S): at 06:14

## 2020-01-01 RX ADMIN — Medication 40 MILLIEQUIVALENT(S): at 10:38

## 2020-01-01 RX ADMIN — Medication 1 GRAM(S): at 17:54

## 2020-01-01 RX ADMIN — Medication 1 MILLIGRAM(S): at 12:11

## 2020-01-01 RX ADMIN — POTASSIUM PHOSPHATE, MONOBASIC POTASSIUM PHOSPHATE, DIBASIC 83.33 MILLIMOLE(S): 236; 224 INJECTION, SOLUTION INTRAVENOUS at 12:07

## 2020-01-01 RX ADMIN — AMLODIPINE BESYLATE 5 MILLIGRAM(S): 2.5 TABLET ORAL at 05:05

## 2020-01-01 RX ADMIN — Medication 400 MILLIGRAM(S): at 19:46

## 2020-01-01 RX ADMIN — PANTOPRAZOLE SODIUM 40 MILLIGRAM(S): 20 TABLET, DELAYED RELEASE ORAL at 17:42

## 2020-01-01 RX ADMIN — ERGOCALCIFEROL 50000 UNIT(S): 1.25 CAPSULE ORAL at 12:11

## 2020-01-01 RX ADMIN — CARVEDILOL PHOSPHATE 25 MILLIGRAM(S): 80 CAPSULE, EXTENDED RELEASE ORAL at 18:23

## 2020-01-01 RX ADMIN — Medication 100 MILLIGRAM(S): at 22:03

## 2020-01-01 RX ADMIN — Medication 10 MILLIGRAM(S): at 21:16

## 2020-01-01 RX ADMIN — Medication 1 GRAM(S): at 05:43

## 2020-01-01 RX ADMIN — PROPOFOL 6.02 MICROGRAM(S)/KG/MIN: 10 INJECTION, EMULSION INTRAVENOUS at 22:10

## 2020-01-01 RX ADMIN — Medication 1 MILLIGRAM(S): at 11:33

## 2020-01-01 RX ADMIN — PREGABALIN 1000 MICROGRAM(S): 225 CAPSULE ORAL at 11:33

## 2020-01-01 RX ADMIN — Medication 100 MILLIGRAM(S): at 13:59

## 2020-01-01 RX ADMIN — Medication 100 MILLIGRAM(S): at 13:21

## 2020-01-01 RX ADMIN — Medication 400 MILLIGRAM(S): at 09:46

## 2020-01-01 RX ADMIN — Medication 100 MILLIGRAM(S): at 14:17

## 2020-01-01 RX ADMIN — PROPOFOL 5 MILLIGRAM(S): 10 INJECTION, EMULSION INTRAVENOUS at 13:22

## 2020-01-01 RX ADMIN — Medication 100 MILLIGRAM(S): at 13:58

## 2020-01-01 RX ADMIN — Medication 40 MILLIEQUIVALENT(S): at 14:55

## 2020-01-01 RX ADMIN — PREGABALIN 1000 MICROGRAM(S): 225 CAPSULE ORAL at 12:21

## 2020-01-01 RX ADMIN — Medication 1 MILLIGRAM(S): at 12:12

## 2020-01-01 RX ADMIN — Medication 100 MILLIGRAM(S): at 05:47

## 2020-01-01 RX ADMIN — CARVEDILOL PHOSPHATE 25 MILLIGRAM(S): 80 CAPSULE, EXTENDED RELEASE ORAL at 17:21

## 2020-01-01 RX ADMIN — Medication 100 MILLIGRAM(S): at 21:25

## 2020-01-01 RX ADMIN — PANTOPRAZOLE SODIUM 80 MILLIGRAM(S): 20 TABLET, DELAYED RELEASE ORAL at 14:02

## 2020-01-01 RX ADMIN — Medication 10 MILLIGRAM(S): at 22:21

## 2020-01-01 RX ADMIN — Medication 10 MILLIGRAM(S): at 22:23

## 2020-01-01 RX ADMIN — AMLODIPINE BESYLATE 5 MILLIGRAM(S): 2.5 TABLET ORAL at 05:51

## 2020-01-01 RX ADMIN — Medication 100 MILLIGRAM(S): at 17:20

## 2020-01-01 RX ADMIN — Medication 250 MILLIGRAM(S): at 09:51

## 2020-01-01 RX ADMIN — Medication 50 MILLIEQUIVALENT(S): at 14:30

## 2020-01-01 RX ADMIN — Medication 1 PACKET(S): at 09:38

## 2020-01-01 RX ADMIN — PREGABALIN 1000 MICROGRAM(S): 225 CAPSULE ORAL at 12:05

## 2020-01-01 RX ADMIN — SODIUM CHLORIDE 60 MILLILITER(S): 9 INJECTION, SOLUTION INTRAVENOUS at 12:05

## 2020-01-01 RX ADMIN — AMLODIPINE BESYLATE 5 MILLIGRAM(S): 2.5 TABLET ORAL at 12:09

## 2020-01-01 RX ADMIN — Medication 1 MILLIGRAM(S): at 12:21

## 2020-01-01 RX ADMIN — Medication 650 MILLIGRAM(S): at 14:12

## 2020-01-01 RX ADMIN — PREGABALIN 1000 MICROGRAM(S): 225 CAPSULE ORAL at 11:08

## 2020-01-01 RX ADMIN — Medication 1 GRAM(S): at 12:21

## 2020-01-01 RX ADMIN — SODIUM CHLORIDE 1000 MILLILITER(S): 9 INJECTION, SOLUTION INTRAVENOUS at 16:27

## 2020-01-01 RX ADMIN — Medication 1 GRAM(S): at 23:42

## 2020-01-01 RX ADMIN — Medication 1 GRAM(S): at 11:47

## 2020-01-01 RX ADMIN — Medication 100 MILLIGRAM(S): at 22:16

## 2020-01-01 RX ADMIN — CEFTRIAXONE 100 MILLIGRAM(S): 500 INJECTION, POWDER, FOR SOLUTION INTRAMUSCULAR; INTRAVENOUS at 10:41

## 2020-01-01 RX ADMIN — PANTOPRAZOLE SODIUM 40 MILLIGRAM(S): 20 TABLET, DELAYED RELEASE ORAL at 06:13

## 2020-01-01 RX ADMIN — PANTOPRAZOLE SODIUM 40 MILLIGRAM(S): 20 TABLET, DELAYED RELEASE ORAL at 09:26

## 2020-01-01 RX ADMIN — Medication 10 MILLIGRAM(S): at 17:34

## 2020-01-01 RX ADMIN — AMLODIPINE BESYLATE 5 MILLIGRAM(S): 2.5 TABLET ORAL at 07:20

## 2020-01-01 RX ADMIN — CARVEDILOL PHOSPHATE 25 MILLIGRAM(S): 80 CAPSULE, EXTENDED RELEASE ORAL at 17:20

## 2020-01-01 RX ADMIN — Medication 1 MILLIGRAM(S): at 11:13

## 2020-01-01 RX ADMIN — Medication 10 MILLIGRAM(S): at 22:15

## 2020-01-01 RX ADMIN — Medication 650 MILLIGRAM(S): at 19:08

## 2020-01-01 RX ADMIN — PHENYLEPHRINE HYDROCHLORIDE 1.88 MICROGRAM(S)/KG/MIN: 10 INJECTION INTRAVENOUS at 18:14

## 2020-01-01 RX ADMIN — Medication 1 GRAM(S): at 23:29

## 2020-01-01 RX ADMIN — Medication 100 MILLIGRAM(S): at 22:23

## 2020-01-01 RX ADMIN — PANTOPRAZOLE SODIUM 40 MILLIGRAM(S): 20 TABLET, DELAYED RELEASE ORAL at 05:05

## 2020-01-01 RX ADMIN — PHENYLEPHRINE HYDROCHLORIDE 1.88 MICROGRAM(S)/KG/MIN: 10 INJECTION INTRAVENOUS at 22:10

## 2020-01-01 RX ADMIN — Medication 100 MILLIGRAM(S): at 21:20

## 2020-01-01 RX ADMIN — LISINOPRIL 20 MILLIGRAM(S): 2.5 TABLET ORAL at 06:50

## 2020-01-01 RX ADMIN — PANTOPRAZOLE SODIUM 10 MG/HR: 20 TABLET, DELAYED RELEASE ORAL at 14:16

## 2020-01-01 RX ADMIN — Medication 1 GRAM(S): at 11:13

## 2020-01-01 RX ADMIN — SODIUM CHLORIDE 75 MILLILITER(S): 9 INJECTION, SOLUTION INTRAVENOUS at 10:08

## 2020-01-01 RX ADMIN — INSULIN HUMAN 10 UNIT(S): 100 INJECTION, SOLUTION SUBCUTANEOUS at 02:32

## 2020-01-01 RX ADMIN — Medication 650 MILLIGRAM(S): at 01:00

## 2020-01-01 RX ADMIN — Medication 500 MILLIGRAM(S): at 14:02

## 2020-01-01 RX ADMIN — Medication 1 GRAM(S): at 12:01

## 2020-01-01 RX ADMIN — PANTOPRAZOLE SODIUM 10 MG/HR: 20 TABLET, DELAYED RELEASE ORAL at 11:33

## 2020-01-01 RX ADMIN — CEFTRIAXONE 100 MILLIGRAM(S): 500 INJECTION, POWDER, FOR SOLUTION INTRAMUSCULAR; INTRAVENOUS at 11:48

## 2020-01-01 RX ADMIN — Medication 50 MILLIEQUIVALENT(S): at 17:40

## 2020-01-01 RX ADMIN — POTASSIUM PHOSPHATE, MONOBASIC POTASSIUM PHOSPHATE, DIBASIC 62.5 MILLIMOLE(S): 236; 224 INJECTION, SOLUTION INTRAVENOUS at 09:23

## 2020-01-01 RX ADMIN — ERGOCALCIFEROL 50000 UNIT(S): 1.25 CAPSULE ORAL at 11:13

## 2020-01-01 RX ADMIN — PANTOPRAZOLE SODIUM 40 MILLIGRAM(S): 20 TABLET, DELAYED RELEASE ORAL at 17:09

## 2020-01-01 RX ADMIN — Medication 1 GRAM(S): at 00:55

## 2020-01-01 RX ADMIN — Medication 100 MILLIEQUIVALENT(S): at 11:32

## 2020-01-01 RX ADMIN — Medication 1 GRAM(S): at 06:26

## 2020-01-01 RX ADMIN — CEFTRIAXONE 100 MILLIGRAM(S): 500 INJECTION, POWDER, FOR SOLUTION INTRAMUSCULAR; INTRAVENOUS at 09:34

## 2020-01-01 RX ADMIN — Medication 650 MILLIGRAM(S): at 21:22

## 2020-01-01 RX ADMIN — CARVEDILOL PHOSPHATE 25 MILLIGRAM(S): 80 CAPSULE, EXTENDED RELEASE ORAL at 17:34

## 2020-01-01 RX ADMIN — CARVEDILOL PHOSPHATE 25 MILLIGRAM(S): 80 CAPSULE, EXTENDED RELEASE ORAL at 06:13

## 2020-01-01 RX ADMIN — Medication 100 MILLIGRAM(S): at 22:21

## 2020-01-01 RX ADMIN — SODIUM CHLORIDE 1000 MILLILITER(S): 9 INJECTION, SOLUTION INTRAVENOUS at 08:30

## 2020-01-01 RX ADMIN — Medication 1 INCH(S): at 15:00

## 2020-01-01 RX ADMIN — FENTANYL CITRATE 100 MICROGRAM(S): 50 INJECTION INTRAVENOUS at 14:00

## 2020-01-01 RX ADMIN — Medication 1 GRAM(S): at 12:05

## 2020-01-01 RX ADMIN — Medication 100 MILLIGRAM(S): at 13:34

## 2020-01-01 RX ADMIN — SODIUM CHLORIDE 75 MILLILITER(S): 9 INJECTION, SOLUTION INTRAVENOUS at 19:51

## 2020-01-01 RX ADMIN — Medication 50 MILLIEQUIVALENT(S): at 17:41

## 2020-01-01 RX ADMIN — Medication 1 MILLIGRAM(S): at 14:57

## 2020-01-01 RX ADMIN — Medication 10 MILLIGRAM(S): at 21:40

## 2020-01-01 RX ADMIN — SODIUM CHLORIDE 1000 MILLILITER(S): 9 INJECTION, SOLUTION INTRAVENOUS at 09:40

## 2020-01-01 RX ADMIN — Medication 100 MILLIEQUIVALENT(S): at 14:15

## 2020-01-01 RX ADMIN — SODIUM CHLORIDE 75 MILLILITER(S): 9 INJECTION, SOLUTION INTRAVENOUS at 05:43

## 2020-01-01 RX ADMIN — Medication 120 MILLIGRAM(S): at 12:11

## 2020-01-01 RX ADMIN — Medication 150 MEQ/KG/HR: at 18:13

## 2020-01-01 RX ADMIN — Medication 10 MILLIGRAM(S): at 21:27

## 2020-01-01 RX ADMIN — PANTOPRAZOLE SODIUM 40 MILLIGRAM(S): 20 TABLET, DELAYED RELEASE ORAL at 19:10

## 2020-01-01 RX ADMIN — Medication 1 GRAM(S): at 11:50

## 2020-01-01 RX ADMIN — Medication 100 MILLIGRAM(S): at 21:39

## 2020-01-01 RX ADMIN — Medication 10 MILLIGRAM(S): at 22:08

## 2020-01-01 RX ADMIN — MIDAZOLAM HYDROCHLORIDE 2 MILLIGRAM(S): 1 INJECTION, SOLUTION INTRAMUSCULAR; INTRAVENOUS at 13:19

## 2020-01-01 RX ADMIN — Medication 1 MILLIGRAM(S): at 11:08

## 2020-01-01 RX ADMIN — CARVEDILOL PHOSPHATE 25 MILLIGRAM(S): 80 CAPSULE, EXTENDED RELEASE ORAL at 05:05

## 2020-01-01 RX ADMIN — Medication 1 MILLIGRAM(S): at 11:47

## 2020-01-01 RX ADMIN — Medication 100 MILLIEQUIVALENT(S): at 17:07

## 2020-01-01 RX ADMIN — Medication 100 MILLIGRAM(S): at 07:21

## 2020-01-01 RX ADMIN — Medication 650 MILLIGRAM(S): at 09:09

## 2020-01-01 RX ADMIN — PANTOPRAZOLE SODIUM 40 MILLIGRAM(S): 20 TABLET, DELAYED RELEASE ORAL at 07:39

## 2020-01-01 RX ADMIN — LISINOPRIL 10 MILLIGRAM(S): 2.5 TABLET ORAL at 05:51

## 2020-01-01 RX ADMIN — Medication 650 MILLIGRAM(S): at 13:07

## 2020-01-01 RX ADMIN — POTASSIUM PHOSPHATE, MONOBASIC POTASSIUM PHOSPHATE, DIBASIC 62.5 MILLIMOLE(S): 236; 224 INJECTION, SOLUTION INTRAVENOUS at 14:56

## 2020-01-01 RX ADMIN — Medication 100 MILLIEQUIVALENT(S): at 11:35

## 2020-01-01 RX ADMIN — PREGABALIN 1000 MICROGRAM(S): 225 CAPSULE ORAL at 11:14

## 2020-01-01 RX ADMIN — Medication 100 MILLIGRAM(S): at 22:15

## 2020-01-01 RX ADMIN — Medication 1 GRAM(S): at 07:21

## 2020-01-01 RX ADMIN — Medication 10 MILLIGRAM(S): at 21:23

## 2020-01-01 RX ADMIN — Medication 1 GRAM(S): at 05:51

## 2020-01-01 RX ADMIN — CARVEDILOL PHOSPHATE 25 MILLIGRAM(S): 80 CAPSULE, EXTENDED RELEASE ORAL at 05:43

## 2020-01-01 RX ADMIN — Medication 100 MILLIEQUIVALENT(S): at 13:58

## 2020-01-01 RX ADMIN — ZINC SULFATE TAB 220 MG (50 MG ZINC EQUIVALENT) 220 MILLIGRAM(S): 220 (50 ZN) TAB at 11:08

## 2020-01-01 RX ADMIN — PREGABALIN 1000 MICROGRAM(S): 225 CAPSULE ORAL at 11:25

## 2020-01-01 RX ADMIN — CARVEDILOL PHOSPHATE 25 MILLIGRAM(S): 80 CAPSULE, EXTENDED RELEASE ORAL at 18:04

## 2020-01-01 RX ADMIN — Medication 100 MILLIGRAM(S): at 22:09

## 2020-01-01 RX ADMIN — PREGABALIN 1000 MICROGRAM(S): 225 CAPSULE ORAL at 11:51

## 2020-01-01 RX ADMIN — Medication 100 MILLIGRAM(S): at 05:57

## 2020-01-01 RX ADMIN — CEFTRIAXONE 100 MILLIGRAM(S): 500 INJECTION, POWDER, FOR SOLUTION INTRAMUSCULAR; INTRAVENOUS at 11:12

## 2020-01-01 RX ADMIN — CARVEDILOL PHOSPHATE 25 MILLIGRAM(S): 80 CAPSULE, EXTENDED RELEASE ORAL at 06:06

## 2020-01-01 RX ADMIN — Medication 1 GRAM(S): at 14:02

## 2020-01-01 RX ADMIN — Medication 1000 MILLIGRAM(S): at 20:30

## 2020-01-01 RX ADMIN — PANTOPRAZOLE SODIUM 40 MILLIGRAM(S): 20 TABLET, DELAYED RELEASE ORAL at 05:51

## 2020-01-01 RX ADMIN — Medication 120 MILLIGRAM(S): at 05:51

## 2020-01-01 RX ADMIN — Medication 1 GRAM(S): at 23:55

## 2020-01-01 RX ADMIN — Medication 100 MILLIGRAM(S): at 14:34

## 2020-01-01 RX ADMIN — PREGABALIN 1000 MICROGRAM(S): 225 CAPSULE ORAL at 11:36

## 2020-01-01 RX ADMIN — Medication 650 MILLIGRAM(S): at 10:38

## 2020-01-01 RX ADMIN — Medication 3 MILLIGRAM(S): at 23:24

## 2020-01-01 RX ADMIN — CARVEDILOL PHOSPHATE 25 MILLIGRAM(S): 80 CAPSULE, EXTENDED RELEASE ORAL at 05:50

## 2020-01-01 RX ADMIN — Medication 3 MILLILITER(S): at 19:50

## 2020-01-01 RX ADMIN — PIPERACILLIN AND TAZOBACTAM 25 GRAM(S): 4; .5 INJECTION, POWDER, LYOPHILIZED, FOR SOLUTION INTRAVENOUS at 22:10

## 2020-01-01 RX ADMIN — Medication 5 MILLIGRAM(S): at 15:00

## 2020-01-01 RX ADMIN — PROPOFOL 50 MILLIGRAM(S): 10 INJECTION, EMULSION INTRAVENOUS at 13:24

## 2020-01-01 RX ADMIN — Medication 1 GRAM(S): at 17:21

## 2020-01-01 RX ADMIN — Medication 100 MILLIEQUIVALENT(S): at 13:19

## 2020-01-01 RX ADMIN — Medication 3 MILLIGRAM(S): at 21:22

## 2020-01-01 RX ADMIN — Medication 650 MILLIGRAM(S): at 21:18

## 2020-01-01 RX ADMIN — Medication 1 GRAM(S): at 06:01

## 2020-01-01 RX ADMIN — Medication 3 MILLIGRAM(S): at 21:20

## 2020-01-01 RX ADMIN — Medication 1 GRAM(S): at 12:11

## 2020-01-01 RX ADMIN — Medication 1 GRAM(S): at 23:28

## 2020-01-01 RX ADMIN — PANTOPRAZOLE SODIUM 40 MILLIGRAM(S): 20 TABLET, DELAYED RELEASE ORAL at 07:20

## 2020-01-01 RX ADMIN — Medication 1 GRAM(S): at 00:39

## 2020-01-01 RX ADMIN — Medication 500 MILLIGRAM(S): at 11:08

## 2020-01-01 RX ADMIN — PREGABALIN 1000 MICROGRAM(S): 225 CAPSULE ORAL at 14:02

## 2020-01-01 RX ADMIN — LISINOPRIL 20 MILLIGRAM(S): 2.5 TABLET ORAL at 05:05

## 2020-01-01 RX ADMIN — Medication 975 MILLIGRAM(S): at 16:30

## 2020-01-01 RX ADMIN — Medication 50 MILLILITER(S): at 02:33

## 2020-01-01 RX ADMIN — PANTOPRAZOLE SODIUM 10 MG/HR: 20 TABLET, DELAYED RELEASE ORAL at 19:40

## 2020-01-01 RX ADMIN — CEFTRIAXONE 100 MILLIGRAM(S): 500 INJECTION, POWDER, FOR SOLUTION INTRAMUSCULAR; INTRAVENOUS at 10:47

## 2020-01-01 RX ADMIN — ZINC OXIDE 1 APPLICATION(S): 200 OINTMENT TOPICAL at 05:05

## 2020-01-01 RX ADMIN — Medication 1 GRAM(S): at 12:12

## 2020-01-01 RX ADMIN — PANTOPRAZOLE SODIUM 10 MG/HR: 20 TABLET, DELAYED RELEASE ORAL at 04:12

## 2020-01-01 RX ADMIN — PANTOPRAZOLE SODIUM 40 MILLIGRAM(S): 20 TABLET, DELAYED RELEASE ORAL at 18:23

## 2020-01-01 RX ADMIN — Medication 1 GRAM(S): at 11:36

## 2020-01-01 RX ADMIN — POTASSIUM PHOSPHATE, MONOBASIC POTASSIUM PHOSPHATE, DIBASIC 62.5 MILLIMOLE(S): 236; 224 INJECTION, SOLUTION INTRAVENOUS at 11:30

## 2020-01-01 RX ADMIN — Medication 100 MILLIGRAM(S): at 05:42

## 2020-01-01 RX ADMIN — Medication 1 GRAM(S): at 17:15

## 2020-01-01 RX ADMIN — Medication 1 BOTTLE: at 20:25

## 2020-01-01 RX ADMIN — Medication 1 GRAM(S): at 11:25

## 2020-01-01 RX ADMIN — Medication 1 GRAM(S): at 05:56

## 2020-01-01 RX ADMIN — MIDAZOLAM HYDROCHLORIDE 6 MILLIGRAM(S): 1 INJECTION, SOLUTION INTRAMUSCULAR; INTRAVENOUS at 13:22

## 2020-01-01 RX ADMIN — LISINOPRIL 20 MILLIGRAM(S): 2.5 TABLET ORAL at 05:50

## 2020-01-01 RX ADMIN — Medication 1 GRAM(S): at 06:06

## 2020-01-01 RX ADMIN — PANTOPRAZOLE SODIUM 40 MILLIGRAM(S): 20 TABLET, DELAYED RELEASE ORAL at 08:31

## 2020-01-01 RX ADMIN — PANTOPRAZOLE SODIUM 40 MILLIGRAM(S): 20 TABLET, DELAYED RELEASE ORAL at 18:04

## 2020-01-01 RX ADMIN — HYDROXYUREA 500 MILLIGRAM(S): 500 CAPSULE ORAL at 23:57

## 2020-01-01 RX ADMIN — CARVEDILOL PHOSPHATE 25 MILLIGRAM(S): 80 CAPSULE, EXTENDED RELEASE ORAL at 07:21

## 2020-01-01 RX ADMIN — ZINC OXIDE 1 APPLICATION(S): 200 OINTMENT TOPICAL at 05:50

## 2020-01-01 RX ADMIN — CARVEDILOL PHOSPHATE 25 MILLIGRAM(S): 80 CAPSULE, EXTENDED RELEASE ORAL at 19:09

## 2020-01-01 RX ADMIN — Medication 10 MILLIGRAM(S): at 22:03

## 2020-01-01 RX ADMIN — Medication 3 MILLIGRAM(S): at 21:27

## 2020-01-01 RX ADMIN — Medication 1 GRAM(S): at 23:10

## 2020-01-01 RX ADMIN — PANTOPRAZOLE SODIUM 10 MG/HR: 20 TABLET, DELAYED RELEASE ORAL at 23:29

## 2020-01-01 RX ADMIN — CARVEDILOL PHOSPHATE 25 MILLIGRAM(S): 80 CAPSULE, EXTENDED RELEASE ORAL at 06:11

## 2020-01-01 RX ADMIN — Medication 1 GRAM(S): at 06:12

## 2020-01-01 RX ADMIN — PANTOPRAZOLE SODIUM 10 MG/HR: 20 TABLET, DELAYED RELEASE ORAL at 05:48

## 2020-01-01 RX ADMIN — CARVEDILOL PHOSPHATE 25 MILLIGRAM(S): 80 CAPSULE, EXTENDED RELEASE ORAL at 05:51

## 2020-01-01 RX ADMIN — Medication 650 MILLIGRAM(S): at 22:24

## 2020-01-01 RX ADMIN — SODIUM CHLORIDE 75 MILLILITER(S): 9 INJECTION, SOLUTION INTRAVENOUS at 23:24

## 2020-01-01 RX ADMIN — Medication 650 MILLIGRAM(S): at 21:25

## 2020-01-01 RX ADMIN — PROPOFOL 6.02 MICROGRAM(S)/KG/MIN: 10 INJECTION, EMULSION INTRAVENOUS at 18:14

## 2020-01-01 RX ADMIN — Medication 10 MILLIGRAM(S): at 22:17

## 2020-01-01 RX ADMIN — Medication 1 GRAM(S): at 18:04

## 2020-01-01 RX ADMIN — LISINOPRIL 20 MILLIGRAM(S): 2.5 TABLET ORAL at 21:22

## 2020-01-01 RX ADMIN — PANTOPRAZOLE SODIUM 40 MILLIGRAM(S): 20 TABLET, DELAYED RELEASE ORAL at 17:18

## 2020-01-01 RX ADMIN — POTASSIUM PHOSPHATE, MONOBASIC POTASSIUM PHOSPHATE, DIBASIC 62.5 MILLIMOLE(S): 236; 224 INJECTION, SOLUTION INTRAVENOUS at 11:14

## 2020-01-01 RX ADMIN — Medication 100 MILLIGRAM(S): at 06:06

## 2020-01-01 RX ADMIN — Medication 100 MILLIGRAM(S): at 05:50

## 2020-01-01 RX ADMIN — SODIUM CHLORIDE 75 MILLILITER(S): 9 INJECTION, SOLUTION INTRAVENOUS at 11:24

## 2020-01-01 RX ADMIN — Medication 1 GRAM(S): at 11:08

## 2020-01-01 RX ADMIN — CARVEDILOL PHOSPHATE 25 MILLIGRAM(S): 80 CAPSULE, EXTENDED RELEASE ORAL at 17:15

## 2020-01-01 RX ADMIN — PANTOPRAZOLE SODIUM 10 MG/HR: 20 TABLET, DELAYED RELEASE ORAL at 02:54

## 2020-01-01 RX ADMIN — Medication 4.7 MICROGRAM(S)/KG/MIN: at 22:10

## 2020-01-01 RX ADMIN — Medication 975 MILLIGRAM(S): at 16:15

## 2020-01-01 RX ADMIN — Medication 10 MILLIGRAM(S): at 21:22

## 2020-01-01 RX ADMIN — AMLODIPINE BESYLATE 5 MILLIGRAM(S): 2.5 TABLET ORAL at 06:13

## 2020-01-01 RX ADMIN — CHLORHEXIDINE GLUCONATE 15 MILLILITER(S): 213 SOLUTION TOPICAL at 17:41

## 2020-01-01 RX ADMIN — SODIUM CHLORIDE 120 MILLILITER(S): 9 INJECTION, SOLUTION INTRAVENOUS at 16:20

## 2020-01-01 RX ADMIN — CHLORHEXIDINE GLUCONATE 15 MILLILITER(S): 213 SOLUTION TOPICAL at 06:13

## 2020-01-01 RX ADMIN — Medication 3 MILLIGRAM(S): at 22:22

## 2020-01-01 RX ADMIN — Medication 1 INCH(S): at 03:00

## 2020-01-01 RX ADMIN — Medication 650 MILLIGRAM(S): at 22:00

## 2020-01-01 RX ADMIN — CARVEDILOL PHOSPHATE 25 MILLIGRAM(S): 80 CAPSULE, EXTENDED RELEASE ORAL at 05:48

## 2020-01-01 RX ADMIN — Medication 1 GRAM(S): at 05:48

## 2020-01-01 RX ADMIN — Medication 100 MILLIGRAM(S): at 06:11

## 2020-01-01 RX ADMIN — CEFTRIAXONE 100 MILLIGRAM(S): 500 INJECTION, POWDER, FOR SOLUTION INTRAMUSCULAR; INTRAVENOUS at 09:57

## 2020-01-01 RX ADMIN — Medication 1 MILLIGRAM(S): at 12:05

## 2020-01-01 RX ADMIN — Medication 1 MILLIGRAM(S): at 11:32

## 2020-01-01 RX ADMIN — CARVEDILOL PHOSPHATE 25 MILLIGRAM(S): 80 CAPSULE, EXTENDED RELEASE ORAL at 17:54

## 2020-01-01 RX ADMIN — Medication 650 MILLIGRAM(S): at 12:10

## 2020-01-01 RX ADMIN — PANTOPRAZOLE SODIUM 40 MILLIGRAM(S): 20 TABLET, DELAYED RELEASE ORAL at 06:01

## 2020-01-01 RX ADMIN — FENTANYL CITRATE 25 MICROGRAM(S): 50 INJECTION INTRAVENOUS at 15:35

## 2020-01-01 RX ADMIN — PANTOPRAZOLE SODIUM 40 MILLIGRAM(S): 20 TABLET, DELAYED RELEASE ORAL at 17:34

## 2020-01-01 RX ADMIN — Medication 1 MILLIGRAM(S): at 11:50

## 2020-01-01 RX ADMIN — Medication 1 GRAM(S): at 23:24

## 2020-01-01 RX ADMIN — PANTOPRAZOLE SODIUM 40 MILLIGRAM(S): 20 TABLET, DELAYED RELEASE ORAL at 05:43

## 2020-01-01 RX ADMIN — CARVEDILOL PHOSPHATE 25 MILLIGRAM(S): 80 CAPSULE, EXTENDED RELEASE ORAL at 17:09

## 2020-01-01 RX ADMIN — Medication 1 GRAM(S): at 13:04

## 2020-01-01 RX ADMIN — Medication 650 MILLIGRAM(S): at 13:10

## 2020-01-01 RX ADMIN — SODIUM CHLORIDE 2400 MILLILITER(S): 9 INJECTION INTRAMUSCULAR; INTRAVENOUS; SUBCUTANEOUS at 09:37

## 2020-01-01 RX ADMIN — Medication 1 GRAM(S): at 11:30

## 2020-01-01 RX ADMIN — Medication 4.7 MICROGRAM(S)/KG/MIN: at 18:13

## 2020-01-01 RX ADMIN — AMLODIPINE BESYLATE 5 MILLIGRAM(S): 2.5 TABLET ORAL at 05:43

## 2020-01-01 RX ADMIN — SODIUM CHLORIDE 60 MILLILITER(S): 9 INJECTION, SOLUTION INTRAVENOUS at 15:02

## 2020-01-01 RX ADMIN — CARVEDILOL PHOSPHATE 25 MILLIGRAM(S): 80 CAPSULE, EXTENDED RELEASE ORAL at 05:49

## 2020-01-01 RX ADMIN — Medication 1 GRAM(S): at 05:49

## 2020-01-01 RX ADMIN — Medication 250 MILLIGRAM(S): at 06:12

## 2020-01-01 RX ADMIN — PANTOPRAZOLE SODIUM 40 MILLIGRAM(S): 20 TABLET, DELAYED RELEASE ORAL at 06:11

## 2020-01-01 RX ADMIN — Medication 1 MILLIGRAM(S): at 14:02

## 2020-01-01 RX ADMIN — Medication 1 GRAM(S): at 08:31

## 2020-01-01 RX ADMIN — PIPERACILLIN AND TAZOBACTAM 200 GRAM(S): 4; .5 INJECTION, POWDER, LYOPHILIZED, FOR SOLUTION INTRAVENOUS at 09:40

## 2020-01-01 RX ADMIN — Medication 650 MILLIGRAM(S): at 10:09

## 2020-01-01 RX ADMIN — Medication 650 MILLIGRAM(S): at 18:08

## 2020-01-01 RX ADMIN — AMLODIPINE BESYLATE 5 MILLIGRAM(S): 2.5 TABLET ORAL at 05:50

## 2020-01-01 RX ADMIN — CARVEDILOL PHOSPHATE 25 MILLIGRAM(S): 80 CAPSULE, EXTENDED RELEASE ORAL at 05:56

## 2020-01-01 RX ADMIN — AMLODIPINE BESYLATE 5 MILLIGRAM(S): 2.5 TABLET ORAL at 06:12

## 2020-01-01 RX ADMIN — Medication 100 MILLIEQUIVALENT(S): at 12:00

## 2020-01-01 RX ADMIN — PREGABALIN 1000 MICROGRAM(S): 225 CAPSULE ORAL at 11:32

## 2020-01-01 RX ADMIN — PANTOPRAZOLE SODIUM 10 MG/HR: 20 TABLET, DELAYED RELEASE ORAL at 17:56

## 2020-01-01 RX ADMIN — AMLODIPINE BESYLATE 5 MILLIGRAM(S): 2.5 TABLET ORAL at 21:28

## 2020-01-01 RX ADMIN — ZINC OXIDE 1 APPLICATION(S): 200 OINTMENT TOPICAL at 19:11

## 2020-01-01 RX ADMIN — Medication 1 GRAM(S): at 00:35

## 2020-01-01 RX ADMIN — Medication 1 GRAM(S): at 00:32

## 2020-01-01 RX ADMIN — Medication 100 MILLIEQUIVALENT(S): at 10:38

## 2020-01-01 RX ADMIN — Medication 1 GRAM(S): at 00:02

## 2020-01-01 RX ADMIN — CARVEDILOL PHOSPHATE 25 MILLIGRAM(S): 80 CAPSULE, EXTENDED RELEASE ORAL at 17:35

## 2020-01-01 RX ADMIN — Medication 1 GRAM(S): at 05:50

## 2020-01-01 RX ADMIN — Medication 1 MILLIGRAM(S): at 11:25

## 2020-01-01 RX ADMIN — Medication 1 GRAM(S): at 19:09

## 2020-01-01 RX ADMIN — Medication 100 MILLIGRAM(S): at 05:51

## 2020-01-01 RX ADMIN — PANTOPRAZOLE SODIUM 40 MILLIGRAM(S): 20 TABLET, DELAYED RELEASE ORAL at 17:21

## 2020-01-01 RX ADMIN — Medication 650 MILLIGRAM(S): at 23:28

## 2020-01-01 RX ADMIN — PREGABALIN 1000 MICROGRAM(S): 225 CAPSULE ORAL at 11:50

## 2020-01-01 RX ADMIN — Medication 650 MILLIGRAM(S): at 23:54

## 2020-01-01 RX ADMIN — SODIUM CHLORIDE 75 MILLILITER(S): 9 INJECTION, SOLUTION INTRAVENOUS at 06:12

## 2020-09-16 NOTE — H&P ADULT - NSHPPHYSICALEXAM_GEN_ALL_CORE
ICU Vital Signs Last 24 Hrs  T(C): 36.9 (16 Sep 2020 09:18), Max: 36.9 (16 Sep 2020 09:18)  T(F): 98.5 (16 Sep 2020 09:18), Max: 98.5 (16 Sep 2020 09:18)  HR: 75 (16 Sep 2020 11:12) (67 - 81)  BP: 116/51 (16 Sep 2020 11:12) (82/34 - 118/58)  BP(mean): --  ABP: --  ABP(mean): --  RR: 21 (16 Sep 2020 11:12) (21 - 24)  SpO2: 98% (16 Sep 2020 11:12) (96% - 100%)  GENERAL: NAD well-developed  HEAD:  Atraumatic, Normocephalic  EYES: EOMI, PERRLA, conjunctiva and sclera clear  ENMT: No tonsillar erythema, exudates, or enlargement; Moist mucous membranes, Good dentition, No lesions  NECK: Supple, No JVD, Normal thyroid  NERVOUS SYSTEM:  Alert & Oriented X3, Good concentration; Motor Strength 5/5 B/L upper and lower extremities; DTRs 2+ intact and symmetric  CHEST/LUNG: Clear to percussion bilaterally; No rales, rhonchi, wheezing, or rubs  HEART: Regular rate and rhythm; No murmurs, rubs, or gallops  ABDOMEN: Soft, Nontender, Nondistended; Bowel sounds present  EXTREMITIES:  2+ Peripheral Pulses, No clubbing, cyanosis, or edema  LYMPH: No lymphadenopathy   SKIN: No rashes or lesions

## 2020-09-16 NOTE — ED PROVIDER NOTE - PROGRESS NOTE DETAILS
Spoke w/ daughter Marilyn: will be in 1pm, consents to blood, has needed transfusion x 1 in the past, hx colon CA 2000 (abd scar) Pt w/ decreased appetite, PO intake. Seems "out there" more off balance, weaker in legs, increased use of bathroom, + nausea. Daughter hoping for PT eval inpatient, SW for help w/ obtaining home help. Spoke w/ Rad: non specific bowel thickening, colitis, mildly prominent appendix - pt normal vs early appy. Pt stools noted to be black w/ changing, sample sent to lab. Spoke w/ surg PA, will follow for ? appy Spoke w/ daughter Marilyn: Will arrive in ED at 1pm, consents to blood, has needed transfusion x 1 in the past, hx colon CA 2000 (abd scar) Pt w/ decreased appetite, PO intake. Seems "out there" more off balance, weaker in legs, increased use of bathroom, + nausea. Daughter hoping for PT eval inpatient, SW for help w/ obtaining home help. Spoke w/ surg PA: Consulted / will follow for ? appy

## 2020-09-16 NOTE — ED ADULT NURSE REASSESSMENT NOTE - NSIMPLEMENTINTERV_GEN_ALL_ED
Implemented All Fall Risk Interventions:  [place] to call system. Call bell, personal items and telephone within reach. Instruct patient to call for assistance. Room bathroom lighting operational. Non-slip footwear when patient is off stretcher. Physically safe environment: no spills, clutter or unnecessary equipment. Stretcher in lowest position, wheels locked, appropriate side rails in place. Provide visual cue, wrist band, yellow gown, etc. Monitor gait and stability. Monitor for mental status changes and reorient to person, place, and time. Review medications for side effects contributing to fall risk. Reinforce activity limits and safety measures with patient and family.

## 2020-09-16 NOTE — ED PROVIDER NOTE - CLINICAL SUMMARY MEDICAL DECISION MAKING FREE TEXT BOX
81yo M w/ PMH HTN, CA BIBA for weakness, diarrhea x 2 days.   Plan: Obtain CT brain, CT AP w/ contrast, lactate, CBC, CMP, coags, ammonia, BC, UA/C, CXR, trop, ECG. Give IVF. Re-eval. 79yo M w/ PMH HTN, CA BIBA for weakness, diarrhea x 2 days.   Plan: Obtain CT brain, CT AP w/ contrast, lactate, CBC, CMP, coags, ammonia, BC, UA/C, CXR, trop, ECG. Give IVF. Re-eval.  ? dig scoop on ecg 79yo M w/ PMH HTN, CA BIBA for weakness, diarrhea x 2 days. AFVSS. Plan: Obtain CT brain, CT AP w/ contrast, lactate, CBC, CMP, coags, ammonia, BC, UA/C, CXR, trop, ECG, occult stool. Give IVF. Re-eval. Labs significant for Hgb 6.4, daughter consents to transfusion, 1u PRBC ordered, + occult stool. + leukocytosis to 17, BUN elevated, c/w hemolysis from GIB. CT w/ colitis. Pt will be admitted to medicine (d/w Dr Casper), Surg consulted d/t CT rd of prominent appendix, GI (Dr Walton) consulted for GIB. Plan d/w pt and his daughter. They understand and agree w/ this plan. 79yo M w/ PMH HTN, CA BIBA for weakness, diarrhea x 2 days. AFVSS. Plan: Obtain CT brain, CT AP w/ contrast, lactate, CBC, CMP, coags, ammonia, BC, UA/C, CXR, trop, ECG, occult stool. Give IVF. Re-eval. Labs significant for Hgb 6.4, daughter consents to transfusion, 1u PRBC ordered, + occult stool. + leukocytosis to 17, BUN elevated, c/w hemolysis from GIB. CT w/ colitis. 1st lactate 2.4 rpt trend down to 1.8. Pt will be admitted to medicine (d/w Dr Casper), Surg consulted d/t CT rd of prominent appendix, GI (Dr Walton) consulted for GIB. Plan d/w pt and his daughter. They understand and agree w/ this plan.

## 2020-09-16 NOTE — H&P ADULT - NSHPREVIEWOFSYSTEMS_GEN_ALL_CORE
poor historian CONSTITUTIONAL: No fever, weight loss, or fatigue  EYES: No eye pain, visual disturbances, or discharge  ENMT:  No difficulty hearing, tinnitus, vertigo; No sinus or throat pain  NECK: No pain or stiffness  RESPIRATORY: No cough, wheezing, chills or hemoptysis; No shortness of breath  CARDIOVASCULAR: No chest pain, palpitations, dizziness, or leg swelling  GASTROINTESTINAL :upper  abdominal or epigastric pain. No nausea, vomiting, or hematemesis; No diarrhea or constipation. No melena or hematochezia.  GENITOURINARY: No dysuria, frequency, hematuria, or incontinence  NEUROLOGICAL: No headaches, memory loss, loss of strength, numbness, or tremors  SKIN: No itching, burning, rashes, or lesions   LYMPH NODES: No enlarged glands  ENDOCRINE: No heat or cold intolerance; No hair loss  MUSCULOSKELETAL: No joint pain or swelling; No muscle, back, or extremity pain  PSYCHIATRIC: No depression, anxiety, mood swings, or difficulty sleeping  HEME/LYMPH: No easy bruising, or bleeding gums  ALLERGY AND IMMUNOLOGIC: No hives or eczema

## 2020-09-16 NOTE — CONSULT NOTE ADULT - SUBJECTIVE AND OBJECTIVE BOX
GENERAL SURGERY CONSULT NOTE    Patient is a 80y old  Male who presents with a chief complaint of     HPI:  81yo M w/ PMH HTN BIBA weakness. Per report: daughter called EMS d/t 2 days of watery, non-bloody diarrhea, worsening weakness. Questionable hx of fall this AM? Pt is not on AC. Pt endorses lower abd pain, but no nausea vomiting  (16 Sep 2020 13:02)    Patient seen and examined at bedside with Dr. Campbell. Patient reports 2 week history of abdominal pain and diarrhea with BRBPR with associated nausea, vomiting, and loss of appetite. Denies fever, chills, SOB, dyspnea, coughing, chest pain. Patient reports history of colon cancer in 2010 treated surgery with colectomy and primary anastomosis via cyberknife at [facility]. Last colonoscoy was 2 years ago with polyps, no malignancy. Last f/u with oncologist was 1 year ago with no concerning findings.     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      Review of Systems:       MEDICATIONS  (STANDING):  pantoprazole  Injectable 40 milliGRAM(s) IV Push every 12 hours    MEDICATIONS  (PRN):      Allergies    No Known Allergies    Intolerances        SOCIAL HISTORY          Smoking: Yes [ ]  No [x]   ______pk yrs          ETOH  Yes [ x]  No [ ]  Social [x ], wine, beer, or liquor a few times per week           DRUGS:  Yes [ ]  No [x ]  if so what______________    FAMILY HISTORY:      Vital Signs Last 24 Hrs  T(C): 36.6 (16 Sep 2020 14:36), Max: 36.9 (16 Sep 2020 09:18)  T(F): 97.8 (16 Sep 2020 14:36), Max: 98.5 (16 Sep 2020 09:18)  HR: 81 (16 Sep 2020 14:36) (67 - 81)  BP: 118/72 (16 Sep 2020 14:36) (82/34 - 126/52)  BP(mean): 81 (16 Sep 2020 14:36) (76 - 81)  RR: 24 (16 Sep 2020 14:36) (21 - 29)  SpO2: 97% (16 Sep 2020 14:36) (96% - 100%)    Physical Exam:    General:  Appears stated age, NAD, WGWN  Chest:  clear breath sounds  Cardiovascular:  Regular rate & rhythm   Abdomen: Midline incision scar from previous surgery noted on exam. Abdomen soft, not tender, non distended, NS x4 quadrants.   Rectal: Some blood noted on SHANTELL   Musculoskeletal:  Difficulty extended right leg d/t hx of fibrosarcoma of the foot   Neuro/Psych: A & O x 3      LABS:                        6.4    17.65 )-----------( 40       ( 16 Sep 2020 09:50 )             19.4     09-16    138  |  104  |  82<H>  ----------------------------<  174<H>  4.4   |  22  |  2.39<H>    Ca    8.3<L>      16 Sep 2020 09:50    TPro  6.3  /  Alb  3.2<L>  /  TBili  1.2  /  DBili  x   /  AST  24  /  ALT  23  /  AlkPhos  47  09-16    PT/INR - ( 16 Sep 2020 09:50 )   PT: 14.5 sec;   INR: 1.26 ratio         PTT - ( 16 Sep 2020 09:50 )  PTT:31.4 sec      RADIOLOGY & ADDITIONAL STUDIES:   CT scan shows prominent appendix     Impression:   80M with PMHx of colon cancer (2010), fibrosarcoma of the foot, ulcerative colitis, chronic anemia, HTN presenting with abdominal pain and diarrhea x 2 weeks admitted for GIB and prominent appendix on CT scan.     Plan:   -Inpatient colonoscopy  -recommend GI consult  -NPO, IVF   -OOB/ambulate as tolerated   -continue medical management and supportive care   -f/u with surgical team in 1-2 days   -discussed with Dr. Campbell GENERAL SURGERY CONSULT NOTE    Patient is a 80y old  Male who presents with a chief complaint of abdominal pain and diarrhea x 2 weeks.    HPI:  81yo M w/ PMH HTN BIBA weakness. Per report: daughter called EMS d/t 2 days of watery, non-bloody diarrhea, worsening weakness. Questionable hx of fall this AM? Pt is not on AC. Pt endorses lower abd pain, but no nausea vomiting  (16 Sep 2020 13:02)    Patient seen and examined at bedside with Dr. Campbell. Patient c/o abdominal pain and diarrhea x2 weeks. Pain began about 2 weeks ago along with nausea, vomiting, and loss of appetite. Pt's daughter noticed that stool was bloody, admits to "red blood". Denies h/o similar incident. Denies fever, chills, SOB, dyspnea, coughing, chest pain.   Patient reports history of colon cancer s/p colectomy and primary anastomosis via cyberknife at [facility] in 2010. Last f/u with oncologist was 1 year ago with no concerning findings per patient.   States his last colonoscopy was 2-3 years ago, was found to have polyps but no other findings per patient.    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      Review of Systems:  Contained within HPI    MEDICATIONS  (STANDING):  pantoprazole  Injectable 40 milliGRAM(s) IV Push every 12 hours    MEDICATIONS  (PRN):      Allergies    No Known Allergies    SOCIAL HISTORY          Smoking: Yes [ ]  No [x]           ETOH  Yes [ x]  No [ ]  Social [x ], wine, beer, or liquor a few times per week           DRUGS:  Yes [ ]  No [x ]  if so what______________    FAMILY HISTORY:  Denies    Vital Signs Last 24 Hrs  T(C): 36.6 (16 Sep 2020 14:36), Max: 36.9 (16 Sep 2020 09:18)  T(F): 97.8 (16 Sep 2020 14:36), Max: 98.5 (16 Sep 2020 09:18)  HR: 81 (16 Sep 2020 14:36) (67 - 81)  BP: 118/72 (16 Sep 2020 14:36) (82/34 - 126/52)  BP(mean): 81 (16 Sep 2020 14:36) (76 - 81)  RR: 24 (16 Sep 2020 14:36) (21 - 29)  SpO2: 97% (16 Sep 2020 14:36) (96% - 100%)    Physical Exam:    General:  Appears stated age, NAD.  Chest:  clear breath sounds b/l, nonlabored respirations  Cardiovascular: S1S2, Regular rate & rhythm   Abdomen: Old healed surgical midline scar noted. +BS, nondistended, soft, non tender, no guarding/rebound   Rectal: SHANTELL performed by Dr. Campbell with mucoid stool noted.   Musculoskeletal:  Difficulty extending right knee d/t hx of fibrosarcoma of the foot   Neuro/Psych: A & O x 3      LABS:                        6.4    17.65 )-----------( 40       ( 16 Sep 2020 09:50 )             19.4     09-16    138  |  104  |  82<H>  ----------------------------<  174<H>  4.4   |  22  |  2.39<H>    Ca    8.3<L>      16 Sep 2020 09:50    TPro  6.3  /  Alb  3.2<L>  /  TBili  1.2  /  DBili  x   /  AST  24  /  ALT  23  /  AlkPhos  47  09-16    PT/INR - ( 16 Sep 2020 09:50 )   PT: 14.5 sec;   INR: 1.26 ratio         PTT - ( 16 Sep 2020 09:50 )  PTT:31.4 sec      RADIOLOGY & ADDITIONAL STUDIES:   < from: CT Abdomen and Pelvis No Cont (09.16.20 @ 11:36) >  EXAM:  CT ABDOMEN AND PELVIS                            PROCEDURE DATE:  09/16/2020          INTERPRETATION:  CLINICAL INFORMATION: Sepsis, weakness, and diarrhea    COMPARISON: None.    PROCEDURE:  CT of the Abdomen and Pelvis was performed without intravenous contrast.  Intravenous contrast: None.  Oral contrast: None.  Sagittal and coronal reformats were performed.    FINDINGS:  LOWER CHEST: Gynecomastia. Minimal basilar atelectasis. Coronary artery calcifications. Small pericardial effusion.    Please note that evaluation of the abdominal organs and vascular structures is limited by lack of intravenous contrast.    LIVER: Within normal limits.  BILE DUCTS: Normal caliber.  GALLBLADDER: Within normal limits.  SPLEEN: Splenomegaly.  PANCREAS: Within normal limits.  ADRENALS: Bilateral adrenal thickening. 2.9 cm indeterminate left adrenal nodule (34 Hounsfield units).  KIDNEYS/URETERS: Right renal hypodensities, possibly cysts. No hydronephrosis.    BLADDER: Mild wall thickening.  REPRODUCTIVE ORGANS: Prostate gland fiducial markers.    BOWEL: Hiatal hernia. Sigmoid anastomosis. No bowel obstruction. Appendix is prominent (9 mm) without periappendiceal inflammatory changes. Mild colonic wall thickening. Scattered colonic diverticula.  PERITONEUM: No ascites.  VESSELS: Atherosclerotic calcifications.  RETROPERITONEUM/LYMPH NODES: Prominent retroperitoneal lymph nodes.  ABDOMINAL WALL: Postsurgical changes. Subcutaneous soft tissue edema.  BONES: Degenerative changes. Right hip arthroplasty.    IMPRESSION:  Mild colonic wall thickening, question mild colitis.  No bowel obstruction.  Prominent appendix without periappendiceal inflammatory changes. Recommend clinical correlation to exclude early appendicitis.    Findings were discussed with Dr. Azul by telephone on 9/16/2020 at 1150 hours.      PEYTON MELGOZA M.D., ATTENDING RADIOLOGIST  This document has been electronically signed. Sep 16 2020 11:51AM    < end of copied text >      Impression:   80M with PMHx of colon cancer (2010), fibrosarcoma of the foot, ulcerative colitis, chronic anemia, HTN presenting with abdominal pain and diarrhea x 2 weeks with blood in stool,  admitted for GIB, Anemia, and colitis, unknown etiology?.   CT findings of prominent appendix, surgery consulted to r/o acute appendicitis. Patient with benign abdominal exam, History and clinical exam not consistent with appendicitis.   [facility]    Plan:   -Admit to medicine   -Recommend NPO, IVF, ABX  -Recommend GI consult for possible EGD/Colonoscopy  -Protonix BID  -Serial CBCs, f/u H/H post transfusion. Transfuse PRN per medical team  -Serial abd exams  -OOB/ambulate as tolerated   -continue medical management and supportive care   -Consider CTA abdomen/pelvis or Bleeding Scan for active GI bleeding, and possible IR intervention   -F/u labs  -discussed with Dr. Campbell GENERAL SURGERY CONSULT NOTE    Patient is a 80y old  Male who presents with a chief complaint of abdominal pain and diarrhea x 2 weeks.    HPI:  81yo M w/ PMH HTN BIBA weakness. Per report: daughter called EMS d/t 2 days of watery, non-bloody diarrhea, worsening weakness. Questionable hx of fall this AM? Pt is not on AC. Pt endorses lower abd pain, but no nausea vomiting  (16 Sep 2020 13:02)    Patient seen and examined at bedside with Dr. Campbell. Patient c/o abdominal pain and diarrhea x approximate 2 weeks. Pain began about 2 weeks ago along with nausea, vomiting, and loss of appetite. Pt's daughter noticed that stool was bloody, admits to "red blood". Denies h/o similar incident before.  Denies fever, chills, SOB, dyspnea, coughing, chest pain.   Patient reports history of colon cancer s/p colectomy and primary anastomosis via  at [facility] in 2010. Last f/u with oncologist was 1 year ago with no concerning findings per patient.   States his last colonoscopy was 2-3 years ago, was found to have polyps but no other findings per patient.    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      Review of Systems:  Contained within HPI    MEDICATIONS  (STANDING):  pantoprazole  Injectable 40 milliGRAM(s) IV Push every 12 hours    MEDICATIONS  (PRN):      Allergies    No Known Allergies    SOCIAL HISTORY          Smoking: Yes [ ]  No [x]           ETOH  Yes [ x]  No [ ]  Social [x ], wine, beer, or liquor a few times per week           DRUGS:  Yes [ ]  No [x ]  if so what______________    FAMILY HISTORY:  Denies    Vital Signs Last 24 Hrs  T(C): 36.6 (16 Sep 2020 14:36), Max: 36.9 (16 Sep 2020 09:18)  T(F): 97.8 (16 Sep 2020 14:36), Max: 98.5 (16 Sep 2020 09:18)  HR: 81 (16 Sep 2020 14:36) (67 - 81)  BP: 118/72 (16 Sep 2020 14:36) (82/34 - 126/52)  BP(mean): 81 (16 Sep 2020 14:36) (76 - 81)  RR: 24 (16 Sep 2020 14:36) (21 - 29)  SpO2: 97% (16 Sep 2020 14:36) (96% - 100%)    Physical Exam:    General:  Appears stated age, NAD.  Chest:  clear breath sounds b/l, nonlabored respirations  Cardiovascular: S1S2, Regular rate & rhythm   Abdomen: Old healed surgical midline scar noted. +BS, nondistended, soft, non tender, no guarding/rebound   Rectal: SHANTELL performed by Dr. Campbell with mucoid stool noted.   Musculoskeletal:  Difficulty extending right knee d/t hx of fibrosarcoma of the foot   Neuro/Psych: A & O x 3      LABS:                        6.4    17.65 )-----------( 40       ( 16 Sep 2020 09:50 )             19.4     09-16    138  |  104  |  82<H>  ----------------------------<  174<H>  4.4   |  22  |  2.39<H>    Ca    8.3<L>      16 Sep 2020 09:50    TPro  6.3  /  Alb  3.2<L>  /  TBili  1.2  /  DBili  x   /  AST  24  /  ALT  23  /  AlkPhos  47  09-16    PT/INR - ( 16 Sep 2020 09:50 )   PT: 14.5 sec;   INR: 1.26 ratio         PTT - ( 16 Sep 2020 09:50 )  PTT:31.4 sec      RADIOLOGY & ADDITIONAL STUDIES:   < from: CT Abdomen and Pelvis No Cont (09.16.20 @ 11:36) >  EXAM:  CT ABDOMEN AND PELVIS                            PROCEDURE DATE:  09/16/2020          INTERPRETATION:  CLINICAL INFORMATION: Sepsis, weakness, and diarrhea    COMPARISON: None.    PROCEDURE:  CT of the Abdomen and Pelvis was performed without intravenous contrast.  Intravenous contrast: None.  Oral contrast: None.  Sagittal and coronal reformats were performed.    FINDINGS:  LOWER CHEST: Gynecomastia. Minimal basilar atelectasis. Coronary artery calcifications. Small pericardial effusion.    Please note that evaluation of the abdominal organs and vascular structures is limited by lack of intravenous contrast.    LIVER: Within normal limits.  BILE DUCTS: Normal caliber.  GALLBLADDER: Within normal limits.  SPLEEN: Splenomegaly.  PANCREAS: Within normal limits.  ADRENALS: Bilateral adrenal thickening. 2.9 cm indeterminate left adrenal nodule (34 Hounsfield units).  KIDNEYS/URETERS: Right renal hypodensities, possibly cysts. No hydronephrosis.    BLADDER: Mild wall thickening.  REPRODUCTIVE ORGANS: Prostate gland fiducial markers.    BOWEL: Hiatal hernia. Sigmoid anastomosis. No bowel obstruction. Appendix is prominent (9 mm) without periappendiceal inflammatory changes. Mild colonic wall thickening. Scattered colonic diverticula.  PERITONEUM: No ascites.  VESSELS: Atherosclerotic calcifications.  RETROPERITONEUM/LYMPH NODES: Prominent retroperitoneal lymph nodes.  ABDOMINAL WALL: Postsurgical changes. Subcutaneous soft tissue edema.  BONES: Degenerative changes. Right hip arthroplasty.    IMPRESSION:  Mild colonic wall thickening, question mild colitis.  No bowel obstruction.  Prominent appendix without periappendiceal inflammatory changes. Recommend clinical correlation to exclude early appendicitis.    Findings were discussed with Dr. Azul by telephone on 9/16/2020 at 1150 hours.      PEYTON MELGOZA M.D., ATTENDING RADIOLOGIST  This document has been electronically signed. Sep 16 2020 11:51AM    < end of copied text >

## 2020-09-16 NOTE — ED ADULT NURSE REASSESSMENT NOTE - NS ED NURSE REASSESS COMMENT FT1
Pt denies any feelings of fever, chills, difficulty breathing, itchiness, back pain.  Pt does not exhibit any symptoms of transfusion reaction.

## 2020-09-16 NOTE — ED PROVIDER NOTE - NS ED ROS FT
+ watery non bloody diarrhea, lower abd pain, chest burning   denies: f/c, sob, cough, back pain, n/v, LE edema

## 2020-09-16 NOTE — ED PROVIDER NOTE - OBJECTIVE STATEMENT
79yo M w/ PMH HTN BIBA weakness. Per report: daughter called EMS d/t 2 days of watery, non-bloody diarrhea, worsening weakness. Questionable hx of fall this AM? Pt is not on AC. Pt endorses lower abd pain, chest burning. Pt is poor historian.     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 NKDA.

## 2020-09-16 NOTE — H&P ADULT - ASSESSMENT
80 years old male with with history of colon cancer with weakness from low hgb - physical therapy also hypotensive so will hold anti hypertensives         IMPROVE VTE Individual Risk Assessment          RISK                                                          Points  [  ] Previous VTE                                                3  [  ] Thrombophilia                                             2  [  ] Lower limb paralysis                                   2        (unable to hold up >15 seconds)    [  ] Current Cancer                                             2         (within 6 months)  [  ] Immobilization > 24 hrs                              1  [  ] ICU/CCU stay > 24 hours                             1  [  ] Age > 60                                                         1    IMPROVE VTE Score: 2

## 2020-09-16 NOTE — H&P ADULT - HISTORY OF PRESENT ILLNESS
81yo M w/ PMH HTN BIBA weakness. Per report: daughter called EMS d/t 2 days of watery, non-bloody diarrhea, worsening weakness. Questionable hx of fall this AM? Pt is not on AC. Pt endorses lower abd pain, chest burning. Pt is poor historian. 79yo M w/ PMH HTN BIBA weakness. Per report: daughter called EMS d/t 2 days of watery, non-bloody diarrhea, worsening weakness. Questionable hx of fall this AM? Pt is not on AC. Pt endorses lower abd pain, but no nausea vomiting

## 2020-09-16 NOTE — ED ADULT NURSE REASSESSMENT NOTE - NS ED NURSE REASSESS COMMENT FT1
pt reminded that we are pending urine sample, family at the bedside.  Pt refused straight cath, HOLD RN made aware.

## 2020-09-16 NOTE — ED ADULT TRIAGE NOTE - CHIEF COMPLAINT QUOTE
per EMS pt's daughter states pt have been having diarrhea for a few days with increase confusion. Pt c/o dizziness states he fell this morning with bruising to left flank noted at triage. Pt denies head injury and loc with fall. left foot amputation noted

## 2020-09-16 NOTE — CONSULT NOTE ADULT - SUBJECTIVE AND OBJECTIVE BOX
full consult dictated    Plan: Pt admitted with Hgb 6.4 -- +black heme + stools.  Pt getting transfused 3u prbc's. Started on IV protonix.  For possible EGD/Colon tomorrow. Colon prep ordered.

## 2020-09-16 NOTE — ED PROVIDER NOTE - PHYSICAL EXAMINATION
GEN: Awake, alert, in NAD.   HEAD AND NECK: NC/AT. Airway patent. Neck supple.   EYES:  Clear b/l. EOMI. PERRL.   ENT: Moist mucus membranes.   CARDIAC: Regular rate, regular rhythm. Muffled heart sounds. No evident pedal edema.    RESP/CHEST: Normal respiratory effort with no use of accessory muscles or retractions. Clear throughout on auscultation.  ABD: Soft, non-distended, non-tender. No rebound, no guarding.   EXTREMITIES: Moving all extremities. S/p amputation L foot.   SKIN: Warm, dry, intact. No rash. + jaundice.   NEURO: CN II-XII grossly intact, no focal deficits.   PSYCH: Appropriate mood and affect.

## 2020-09-17 NOTE — PROGRESS NOTE ADULT - SUBJECTIVE AND OBJECTIVE BOX
Patient is a 80y old  Male who presents with a chief complaint of     OVERNIGHT EVENTS:    MEDICATIONS  (STANDING):  bisacodyl 10 milliGRAM(s) Oral at bedtime  pantoprazole  Injectable 40 milliGRAM(s) IV Push every 12 hours  potassium chloride  10 mEq/100 mL IVPB 10 milliEquivalent(s) IV Intermittent every 1 hour    MEDICATIONS  (PRN):      Allergies    No Known Allergies    Intolerances        SUBJECTIVE: in bed in NAD, no acute events overnight     T(F): 98.1 (20 @ 09:53), Max: 99 (20 @ 17:55)  HR: 84 (20 @ 09:53) (75 - 84)  BP: 134/63 (20 @ 09:53) (112/52 - 134/63)  RR: 18 (20 @ 09:53) (18 - 29)  SpO2: 100% (20 @ 09:53) (97% - 100%)  Wt(kg): --    PHYSICAL EXAM:  GENERAL: NAD, well-groomed, well-developed  HEAD:  Atraumatic, Normocephalic  EYES: EOMI, PERRLA, conjunctiva and sclera clear  ENMT: No tonsillar erythema, exudates, or enlargement; Moist mucous membranes, Good dentition, No lesions  NECK: Supple, No JVD, Normal thyroid  CHEST/LUNG: Clear to  auscultation bilaterally; No rales, rhonchi, wheezing, or rubs  bilaterally  HEART: Regular rate and rhythm; No murmurs, rubs, or gallops  ABDOMEN: Soft, Nontender, Nondistended; Bowel sounds present  EXTREMITIES:  2+ Peripheral Pulses, No clubbing, cyanosis, or edema BL LE  SKIN: No rashes or lesions  NERVOUS SYSTEM:  Alert & Oriented X3, Good concentration; Motor Strength 5/5 B/L upper and lower extremities;   DTRs 2+ intact and symmetric, sensation intact BL    LABS:                        8.5    26.47 )-----------( 43       ( 17 Sep 2020 09:59 )             24.3         144  |  112<H>  |  115<H>  ----------------------------<  137<H>  3.2<L>   |  21<L>  |  1.29    Ca    7.8<L>      17 Sep 2020 09:59  Phos  2.1       Mg     3.0         TPro  5.2<L>  /  Alb  2.7<L>  /  TBili  1.0  /  DBili  x   /  AST  19  /  ALT  12  /  AlkPhos  32<L>      PT/INR - ( 17 Sep 2020 09:59 )   PT: 13.6 sec;   INR: 1.18 ratio         PTT - ( 16 Sep 2020 09:50 )  PTT:31.4 sec  Urinalysis Basic - ( 17 Sep 2020 01:11 )    Color: Yellow / Appearance: Clear / S.015 / pH: x  Gluc: x / Ketone: Trace  / Bili: Negative / Urobili: Negative mg/dL   Blood: x / Protein: Negative mg/dL / Nitrite: Negative   Leuk Esterase: Negative / RBC: x / WBC x   Sq Epi: x / Non Sq Epi: x / Bacteria: x      Cultures;   CAPILLARY BLOOD GLUCOSE        Lipid panel:     CARDIAC MARKERS ( 16 Sep 2020 09:50 )  .046 ng/mL / x     / x     / x     / x            RADIOLOGY & ADDITIONAL TESTS:      Imaging Personally Reviewed:  [ x] YES      Consultant(s) Notes Reviewed:  [x ] YES     Care Discussed with [x ] Consultants [X ] Patient [x ] Family  [x ]    [x ]  Other; RN Patient is a 80y old  Male who presents with a chief complaint of     OVERNIGHT EVENTS:    MEDICATIONS  (STANDING):  bisacodyl 10 milliGRAM(s) Oral at bedtime  pantoprazole  Injectable 40 milliGRAM(s) IV Push every 12 hours  potassium chloride  10 mEq/100 mL IVPB 10 milliEquivalent(s) IV Intermittent every 1 hour    MEDICATIONS  (PRN):      Allergies    No Known Allergies    Intolerances        SUBJECTIVE: in bed in NAD, no acute events overnight     T(F): 98.1 (20 @ 09:53), Max: 99 (20 @ 17:55)  HR: 84 (20 @ 09:53) (75 - 84)  BP: 134/63 (20 @ 09:53) (112/52 - 134/63)  RR: 18 (20 @ 09:53) (18 - 29)  SpO2: 100% (20 @ 09:53) (97% - 100%)  Wt(kg): --    PHYSICAL EXAM:  GENERAL: NAD, well-groomed, well-developed  HEAD:  Atraumatic, Normocephalic  EYES: EOMI, PERRLA, conjunctiva and sclera clear  ENMT: No tonsillar erythema, exudates, or enlargement; Moist mucous membranes, Good dentition, No lesions  NECK: Supple, No JVD, Normal thyroid  CHEST/LUNG: Clear to  auscultation bilaterally; No rales, rhonchi, wheezing, or rubs  bilaterally  HEART: Regular rate and rhythm; No murmurs, rubs, or gallops  ABDOMEN: Soft, Nontender, Nondistended; Bowel sounds present  EXTREMITIES:  2+ Peripheral Pulses, No clubbing, cyanosis, or edema BL LE  SKIN: No rashes or lesions  NERVOUS SYSTEM:  Alert & confused  s./p left tma   DTRs 2+ intact and symmetric, sensation intact BL    LABS:                        8.5    26.47 )-----------( 43       ( 17 Sep 2020 09:59 )             24.3     -    144  |  112<H>  |  115<H>  ----------------------------<  137<H>  3.2<L>   |  21<L>  |  1.29    Ca    7.8<L>      17 Sep 2020 09:59  Phos  2.1       Mg     3.0         TPro  5.2<L>  /  Alb  2.7<L>  /  TBili  1.0  /  DBili  x   /  AST  19  /  ALT  12  /  AlkPhos  32<L>      PT/INR - ( 17 Sep 2020 09:59 )   PT: 13.6 sec;   INR: 1.18 ratio         PTT - ( 16 Sep 2020 09:50 )  PTT:31.4 sec  Urinalysis Basic - ( 17 Sep 2020 01:11 )    Color: Yellow / Appearance: Clear / S.015 / pH: x  Gluc: x / Ketone: Trace  / Bili: Negative / Urobili: Negative mg/dL   Blood: x / Protein: Negative mg/dL / Nitrite: Negative   Leuk Esterase: Negative / RBC: x / WBC x   Sq Epi: x / Non Sq Epi: x / Bacteria: x      Cultures;   CAPILLARY BLOOD GLUCOSE        Lipid panel:     CARDIAC MARKERS ( 16 Sep 2020 09:50 )  .046 ng/mL / x     / x     / x     / x            RADIOLOGY & ADDITIONAL TESTS:      Imaging Personally Reviewed:  [ x] YES      Consultant(s) Notes Reviewed:  [x ] YES     Care Discussed with [x ] Consultants [X ] Patient [x ] Family  [x ]    [x ]  Other; RN

## 2020-09-17 NOTE — PROGRESS NOTE ADULT - SUBJECTIVE AND OBJECTIVE BOX
Patient seen and examined bedside resting comfortably.  No complaints offered.   Abdominal pain is well controlled.  Denies chest pain, dyspnea, cough.    T(F): 98.1 (09-17-20 @ 09:53), Max: 99 (09-16-20 @ 17:55)  HR: 84 (09-17-20 @ 09:53) (75 - 84)  BP: 134/63 (09-17-20 @ 09:53) (112/52 - 134/63)  RR: 18 (09-17-20 @ 09:53) (18 - 29)  SpO2: 100% (09-17-20 @ 09:53) (97% - 100%)    PHYSICAL EXAM:  General: NAD  Neuro:  Alert & oriented x 3  Abdomen: soft, NTND. Normactive BS midline surgical scar    LABS:                        8.5    26.47 )-----------( 43       ( 17 Sep 2020 09:59 )             24.3     09-17    144  |  112<H>  |  115<H>  ----------------------------<  137<H>  3.2<L>   |  21<L>  |  1.29    Ca    7.8<L>      17 Sep 2020 09:59  Phos  2.1     09-17  Mg     3.0     09-17    TPro  5.2<L>  /  Alb  2.7<L>  /  TBili  1.0  /  DBili  x   /  AST  19  /  ALT  12  /  AlkPhos  32<L>  09-17    PT/INR - ( 17 Sep 2020 09:59 )   PT: 13.6 sec;   INR: 1.18 ratio         Cancer Antigen, GI Ca 19-9: 3:   Carcinoembryonic Antigen: 0.9:    I&O's Detail    16 Sep 2020 07:01  -  17 Sep 2020 07:00  --------------------------------------------------------  IN:    Sodium Chloride 0.9% Bolus - Pediatric: 2000 mL  Total IN: 2000 mL    OUT:    Voided (mL): 1 mL  Total OUT: 1 mL    Total NET: 1999 mL          Impression: 80y Male admitted with ANEMIA UNSPECIFIED TYPE - was transfused 3 units PRBC  H/O colon CA s/p colon resection  fibrosarcoma of Rt foot            Plan:  - pt to have UGI today by Dr Walton  -surgery to continue to monitor. No indication for surgical intervention a this time  - will discuss with Dr. Campbell   Patient seen and examined bedside resting comfortably.  No complaints offered.   Abdominal pain is well controlled.  Denies chest pain, dyspnea, cough.    T(F): 98.1 (09-17-20 @ 09:53), Max: 99 (09-16-20 @ 17:55)  HR: 84 (09-17-20 @ 09:53) (75 - 84)  BP: 134/63 (09-17-20 @ 09:53) (112/52 - 134/63)  RR: 18 (09-17-20 @ 09:53) (18 - 29)  SpO2: 100% (09-17-20 @ 09:53) (97% - 100%)    PHYSICAL EXAM:  General: NAD  Neuro:  Alert & oriented x 3  Abdomen: soft, NTND. Normactive BS midline surgical scar    LABS:                        8.5    26.47 )-----------( 43       ( 17 Sep 2020 09:59 )             24.3     09-17    144  |  112<H>  |  115<H>  ----------------------------<  137<H>  3.2<L>   |  21<L>  |  1.29    Ca    7.8<L>      17 Sep 2020 09:59  Phos  2.1     09-17  Mg     3.0     09-17    TPro  5.2<L>  /  Alb  2.7<L>  /  TBili  1.0  /  DBili  x   /  AST  19  /  ALT  12  /  AlkPhos  32<L>  09-17    PT/INR - ( 17 Sep 2020 09:59 )   PT: 13.6 sec;   INR: 1.18 ratio         Cancer Antigen, GI Ca 19-9: 3:   Carcinoembryonic Antigen: 0.9:    I&O's Detail    16 Sep 2020 07:01  -  17 Sep 2020 07:00  --------------------------------------------------------  IN:    Sodium Chloride 0.9% Bolus - Pediatric: 2000 mL  Total IN: 2000 mL    OUT:    Voided (mL): 1 mL  Total OUT: 1 mL    Total NET: 1999 mL          Impression:     80y Male admitted with ANEMIA UNSPECIFIED TYPE, most likely GI Bleeding.  - was transfused 3 units PRBC  H/O colon CA s/p colon resection  fibrosarcoma of Rt foot.  Hypokalemia.  ARF improving      Plan:  - pt to have UGI today by Dr Walton  -surgery to continue to monitor. No indication for Urgent surgical intervention a this time  - will discuss with Dr. Campbell  -Replete LK+  -Continue Hydration.   Patient seen and examined bedside resting comfortably.  No complaints offered.   Abdominal pain is well controlled.  Denies chest pain, dyspnea, cough.    T(F): 98.1 (09-17-20 @ 09:53), Max: 99 (09-16-20 @ 17:55)  HR: 84 (09-17-20 @ 09:53) (75 - 84)  BP: 134/63 (09-17-20 @ 09:53) (112/52 - 134/63)  RR: 18 (09-17-20 @ 09:53) (18 - 29)  SpO2: 100% (09-17-20 @ 09:53) (97% - 100%)    PHYSICAL EXAM:  General: NAD  Neuro:  Alert & oriented x 3  Abdomen: soft, NTND. Normactive BS midline surgical scar    LABS:                        8.5    26.47 )-----------( 43       ( 17 Sep 2020 09:59 )             24.3     09-17    144  |  112<H>  |  115<H>  ----------------------------<  137<H>  3.2<L>   |  21<L>  |  1.29    Ca    7.8<L>      17 Sep 2020 09:59  Phos  2.1     09-17  Mg     3.0     09-17    TPro  5.2<L>  /  Alb  2.7<L>  /  TBili  1.0  /  DBili  x   /  AST  19  /  ALT  12  /  AlkPhos  32<L>  09-17    PT/INR - ( 17 Sep 2020 09:59 )   PT: 13.6 sec;   INR: 1.18 ratio         Cancer Antigen, GI Ca 19-9: 3:   Carcinoembryonic Antigen: 0.9:    I&O's Detail    16 Sep 2020 07:01  -  17 Sep 2020 07:00  --------------------------------------------------------  IN:    Sodium Chloride 0.9% Bolus - Pediatric: 2000 mL  Total IN: 2000 mL    OUT:    Voided (mL): 1 mL  Total OUT: 1 mL    Total NET: 1999 mL          Impression:     80y Male admitted with ANEMIA UNSPECIFIED TYPE, most likely GI Bleeding.  - was transfused 3 units PRBC  H/O colon CA s/p colon resection  fibrosarcoma of Rt foot.  Hypokalemia.  ARF improving  Leukocytosis      Plan:  - pt to have UGI today by Dr Walton  -surgery to continue to monitor. No indication for Urgent surgical intervention a this time  - will discuss with Dr. Campbell  -Replete LK+  -Continue Hydration.

## 2020-09-17 NOTE — PROGRESS NOTE ADULT - PROBLEM SELECTOR PLAN 2
acute blood loss anemia from gib-s/p 3 units of blood    for egd and colonoscopy on today     continue with Protonix   tumor markers noted and wnl

## 2020-09-17 NOTE — PROGRESS NOTE ADULT - SUBJECTIVE AND OBJECTIVE BOX
see gastroscopy report    Imp: Hemorrhagic Gastritis; Gastric Ulcers    Plan: Carafate liquid; Protonix; clear liquids; follow CBC

## 2020-09-18 NOTE — PROGRESS NOTE ADULT - SUBJECTIVE AND OBJECTIVE BOX
Patient is a 80y old  Male who presents with a chief complaint of     OVERNIGHT EVENTS: early am had large melena stool    MEDICATIONS  (STANDING):  bisacodyl 10 milliGRAM(s) Oral at bedtime  cefTRIAXone   IVPB      pantoprazole Infusion 8 mG/Hr (10 mL/Hr) IV Continuous <Continuous>  sucralfate suspension 1 Gram(s) Oral every 6 hours    MEDICATIONS  (PRN):    Allergies    No Known Allergies    Intolerances        SUBJECTIVE: in bed in NAD, acute events overnight  noted    Vital Signs Last 24 Hrs  T(C): 36.4 (18 Sep 2020 05:07), Max: 37.1 (17 Sep 2020 15:34)  T(F): 97.5 (18 Sep 2020 05:07), Max: 98.8 (17 Sep 2020 15:34)  HR: 87 (18 Sep 2020 05:07) (87 - 113)  BP: 146/60 (18 Sep 2020 05:07) (129/51 - 187/73)  BP(mean): --  RR: 18 (18 Sep 2020 05:07) (17 - 20)  SpO2: 98% (18 Sep 2020 05:07) (96% - 100%)    PHYSICAL EXAM:  GENERAL: NAD, well-groomed, well-developed  HEAD:  Atraumatic, Normocephalic  EYES: EOMI, PERRLA, conjunctiva and sclera clear  ENMT: No tonsillar erythema, exudates, or enlargement; Moist mucous membranes, Good dentition, No lesions  NECK: Supple, No JVD, Normal thyroid  CHEST/LUNG: Clear to  auscultation bilaterally; No rales, rhonchi, wheezing, or rubs  bilaterally  HEART: Regular rate and rhythm; No murmurs, rubs, or gallops  ABDOMEN: Soft, Nontender, Nondistended; Bowel sounds present  EXTREMITIES:  2+ Peripheral Pulses, No clubbing, cyanosis, or edema BL LE  SKIN: No rashes or lesions  NERVOUS SYSTEM:  Alert & confused  s./p left tma   DTRs 2+ intact and symmetric, sensation intact BL    LABS:                                     7.2    20.83 )-----------( 38       ( 18 Sep 2020 07:52 )             21.3   09-18    148<H>  |  117<H>  |  87<H>  ----------------------------<  131<H>  3.1<L>   |  22  |  1.02    Ca    8.0<L>      18 Sep 2020 07:52  Phos  3.3       Mg     3.0         TPro  5.2<L>  /  Alb  2.7<L>  /  TBili  1.0  /  DBili  x   /  AST  19  /  ALT  12  /  AlkPhos  32<L>  -       PTT - ( 16 Sep 2020 09:50 )  PTT:31.4 sec  Urinalysis Basic - ( 17 Sep 2020 01:11 )    Color: Yellow / Appearance: Clear / S.015 / pH: x  Gluc: x / Ketone: Trace  / Bili: Negative / Urobili: Negative mg/dL   Blood: x / Protein: Negative mg/dL / Nitrite: Negative   Leuk Esterase: Negative / RBC: x / WBC x   Sq Epi: x / Non Sq Epi: x / Bacteria: x      Cultures;   CAPILLARY BLOOD GLUCOSE        Lipid panel:     CARDIAC MARKERS ( 16 Sep 2020 09:50 )  .046 ng/mL / x     / x     / x     / x            RADIOLOGY & ADDITIONAL TESTS:      Imaging Personally Reviewed:  [ x] YES      Consultant(s) Notes Reviewed:  [x ] YES     Care Discussed with [x ] Consultants [X ] Patient [x ] Family  [x ]    [x ]  Other; RN

## 2020-09-18 NOTE — PROCEDURE NOTE - PROCEDURE FINDINGS AND DETAILS
pt s/p bone marrow biopsy/aspiration.  10mL of marrow aspirated and a small piece of bone obtained and sent to lab for analysis.  Pt tolerated procedure well.  Vitals remained stable pt s/p bone marrow biopsy/aspiration.  10mL of marrow aspirated and a small piece of bone obtained and sent to lab for analysis.  Pt tolerated procedure well.  Vitals remained stable.  Pt had some discomfort to insertion site.  975mg of tylenol given

## 2020-09-18 NOTE — PROGRESS NOTE ADULT - SUBJECTIVE AND OBJECTIVE BOX
INTERVAL HPI/OVERNIGHT EVENTS:  Pt. seen and examined at bedside resting comfortably. C/o lower abdominal pain, "5/10" requesting for pain medication. States pain is about the same, no worse. Tolerating clear liquid diet. +Belching. Denies N/V. +flatus.   Per RN, pt had a very large dark tarry BM this morning.   Denies fever/chills, chest pain, dyspnea, cough, dizziness.     Vital Signs Last 24 Hrs  T(C): 36.4 (18 Sep 2020 05:07), Max: 37.1 (17 Sep 2020 15:34)  T(F): 97.5 (18 Sep 2020 05:07), Max: 98.8 (17 Sep 2020 15:34)  HR: 87 (18 Sep 2020 05:07) (87 - 113)  BP: 146/60 (18 Sep 2020 05:07) (129/51 - 187/73)  RR: 18 (18 Sep 2020 05:07) (17 - 20)  SpO2: 98% (18 Sep 2020 05:07) (96% - 100%)    PHYSICAL EXAM:    GENERAL: NAD  CHEST/LUNG: Clear to ausculation, bilaterally   HEART: S1S2  ABDOMEN: non distended, +BS, soft, non tender, no guarding  EXTREMITIES:  calf soft, non tender b/l. 2+ distal pulses b/l.     I&O's Detail      LABS:                        7.2    20.83 )-----------( 38       ( 18 Sep 2020 07:52 )             21.3     09-18    148<H>  |  117<H>  |  87<H>  ----------------------------<  131<H>  3.1<L>   |  22  |  1.02    Ca    8.0<L>      18 Sep 2020 07:52  Phos  3.3     09-18  Mg     3.0     09-18    TPro  5.2<L>  /  Alb  2.7<L>  /  TBili  1.0  /  DBili  x   /  AST  19  /  ALT  12  /  AlkPhos  32<L>  09-17    PT/INR - ( 17 Sep 2020 09:59 )   PT: 13.6 sec;   INR: 1.18 ratio           Urinalysis Basic - ( 17 Sep 2020 01:11 )    Color: Yellow / Appearance: Clear / S.015 / pH: x  Gluc: x / Ketone: Trace  / Bili: Negative / Urobili: Negative mg/dL   Blood: x / Protein: Negative mg/dL / Nitrite: Negative   Leuk Esterase: Negative / RBC: x / WBC x   Sq Epi: x / Non Sq Epi: x / Bacteria: x      Culture Results:   No growth to date. ( @ 14:04)  Culture Results:   No growth to date. ( @ 14:04)    Impression:   80M with PMHx of colon cancer (), fibrosarcoma of the foot, ulcerative colitis, chronic anemia, HTN presenting with abdominal pain and diarrhea x 2 weeks with blood in stool,    admitted for ABLA 2/2 GIB, Colitis, ANNEMARIE (improving). S/p EGD , found to have Hemorrhagic Gastritis; Gastric Ulcers   Surgery consulted to r/o acute appendicitis. Patient with benign abdominal exam, doubt appendicitis.  Drop in H/H this morning.       Plan:   -Serial CBCs, transfuse PRN. 2uPRBC ordered by medicine today.   -Recommend NPO, IVF, empiric IV Antibiotics for colitis  -GI follow up   -Protonix gtt  -Serial abd exams  -Pain control PRN  -OOB/ambulate as tolerated   -continue medical management and supportive care   -Consider CTA abdomen/pelvis or Bleeding Scan for active GI bleeding, and possible IR intervention if gross Rectal bleeding.  -Discussed with Dr. Campbell INTERVAL HPI/OVERNIGHT EVENTS:  Pt. seen and examined at bedside resting comfortably. C/o lower abdominal pain, "5/10" requesting for pain medication. States pain is about the same, no worse. Tolerating clear liquid diet. +Belching. Denies N/V. +flatus.   Per RN, pt had a very large dark tarry BM this morning.   Denies fever/chills, chest pain, dyspnea, cough, dizziness.     Vital Signs Last 24 Hrs  T(C): 36.4 (18 Sep 2020 05:07), Max: 37.1 (17 Sep 2020 15:34)  T(F): 97.5 (18 Sep 2020 05:07), Max: 98.8 (17 Sep 2020 15:34)  HR: 87 (18 Sep 2020 05:07) (87 - 113)  BP: 146/60 (18 Sep 2020 05:07) (129/51 - 187/73)  RR: 18 (18 Sep 2020 05:07) (17 - 20)  SpO2: 98% (18 Sep 2020 05:07) (96% - 100%)    PHYSICAL EXAM:    GENERAL: NAD  CHEST/LUNG: Clear to ausculation, bilaterally   HEART: S1S2  ABDOMEN: non distended, +BS, soft, non tender, no guarding  EXTREMITIES:  calf soft, non tender b/l. 2+ distal pulses b/l.     I&O's Detail      LABS:                        7.2    20.83 )-----------( 38       ( 18 Sep 2020 07:52 )             21.3     09-18    148<H>  |  117<H>  |  87<H>  ----------------------------<  131<H>  3.1<L>   |  22  |  1.02    Ca    8.0<L>      18 Sep 2020 07:52  Phos  3.3     09-18  Mg     3.0     09-18    TPro  5.2<L>  /  Alb  2.7<L>  /  TBili  1.0  /  DBili  x   /  AST  19  /  ALT  12  /  AlkPhos  32<L>  09-17    PT/INR - ( 17 Sep 2020 09:59 )   PT: 13.6 sec;   INR: 1.18 ratio           Urinalysis Basic - ( 17 Sep 2020 01:11 )    Color: Yellow / Appearance: Clear / S.015 / pH: x  Gluc: x / Ketone: Trace  / Bili: Negative / Urobili: Negative mg/dL   Blood: x / Protein: Negative mg/dL / Nitrite: Negative   Leuk Esterase: Negative / RBC: x / WBC x   Sq Epi: x / Non Sq Epi: x / Bacteria: x      Culture Results:   No growth to date. ( @ 14:04)  Culture Results:   No growth to date. ( @ 14:04)    Impression:   80M with PMHx of colon cancer (), fibrosarcoma of the foot, ulcerative colitis, chronic anemia, HTN presenting with abdominal pain and diarrhea x 2 weeks with blood in stool,    admitted for ABLA 2/2 GIB, Colitis, ANNEMARIE (improving). S/p EGD ,   EGD found to have Hemorrhagic Gastritis; Gastric Ulcers   Colitis   Hypokalemia.  Anemia.  Drop in H/H this morning.       Plan:   -Serial CBCs, transfuse PRN. 2uPRBC ordered by medicine today.   -Recommend NPO, IVF, empiric IV Antibiotics for colitis  -GI follow up, patient needs Colonoscopy.  -Protonix gtt  -Serial abd exams  -Pain control PRN  -OOB/ambulate as tolerated   -continue medical management and supportive care   -Consider CTA abdomen/pelvis or Bleeding Scan for active GI bleeding, and possible IR intervention if gross Rectal bleeding.  -Discussed with Dr. Campbell INTERVAL HPI/OVERNIGHT EVENTS:  Pt. seen and examined at bedside resting comfortably. C/o lower abdominal pain, "5/10" requesting for pain medication. States pain is about the same, no worse. Tolerating clear liquid diet. +Belching. Denies N/V. +flatus.   Per RN, pt had a very large dark tarry BM this morning.   Denies fever/chills, chest pain, dyspnea, cough, dizziness.     Vital Signs Last 24 Hrs  T(C): 36.4 (18 Sep 2020 05:07), Max: 37.1 (17 Sep 2020 15:34)  T(F): 97.5 (18 Sep 2020 05:07), Max: 98.8 (17 Sep 2020 15:34)  HR: 87 (18 Sep 2020 05:07) (87 - 113)  BP: 146/60 (18 Sep 2020 05:07) (129/51 - 187/73)  RR: 18 (18 Sep 2020 05:07) (17 - 20)  SpO2: 98% (18 Sep 2020 05:07) (96% - 100%)    PHYSICAL EXAM:    GENERAL: NAD  CHEST/LUNG: Clear to ausculation, bilaterally   HEART: S1S2  ABDOMEN: non distended, +BS, soft, non tender, no guarding  EXTREMITIES:  calf soft, non tender b/l. 2+ distal pulses b/l.     I&O's Detail      LABS:                        7.2    20.83 )-----------( 38       ( 18 Sep 2020 07:52 )             21.3     09-18    148<H>  |  117<H>  |  87<H>  ----------------------------<  131<H>  3.1<L>   |  22  |  1.02    Ca    8.0<L>      18 Sep 2020 07:52  Phos  3.3     09-18  Mg     3.0     09-18    TPro  5.2<L>  /  Alb  2.7<L>  /  TBili  1.0  /  DBili  x   /  AST  19  /  ALT  12  /  AlkPhos  32<L>  09-17    PT/INR - ( 17 Sep 2020 09:59 )   PT: 13.6 sec;   INR: 1.18 ratio           Urinalysis Basic - ( 17 Sep 2020 01:11 )    Color: Yellow / Appearance: Clear / S.015 / pH: x  Gluc: x / Ketone: Trace  / Bili: Negative / Urobili: Negative mg/dL   Blood: x / Protein: Negative mg/dL / Nitrite: Negative   Leuk Esterase: Negative / RBC: x / WBC x   Sq Epi: x / Non Sq Epi: x / Bacteria: x      Culture Results:   No growth to date. ( @ 14:04)  Culture Results:   No growth to date. ( @ 14:04)    Impression:   80M with PMHx of colon cancer (), fibrosarcoma of the foot, ulcerative colitis, chronic anemia, HTN presenting with abdominal pain and diarrhea x 2 weeks with blood in stool,    admitted for ABLA 2/2 GIB, Colitis, ANNEMARIE (improving). S/p EGD ,   EGD found to have Hemorrhagic Gastritis; Gastric Ulcers   Colitis.  Leukocytosis.  Hypokalemia.  Anemia.  Drop in H/H this morning.       Plan:   -Serial CBCs, transfuse PRN. 2uPRBC ordered by medicine today.   -Recommend NPO, IVF, empiric IV Antibiotics for colitis  -GI follow up, patient needs Colonoscopy.  -Protonix gtt  -Serial abd exams  -Pain control PRN  -OOB/ambulate as tolerated   -continue medical management and supportive care   -Consider CTA abdomen/pelvis or Bleeding Scan for active GI bleeding, and possible IR intervention if gross Rectal bleeding.  -Discussed with Dr. Campbell

## 2020-09-18 NOTE — PROGRESS NOTE ADULT - SUBJECTIVE AND OBJECTIVE BOX
Pt continues with black tarry stools  hgb dropped  s/p EGD with hemorrhagic gastritis and gastric ulcers  on protonix and carafate  concerned about elevated WBC    MEDICATIONS  (STANDING):  bisacodyl 10 milliGRAM(s) Oral at bedtime  pantoprazole  Injectable 40 milliGRAM(s) IV Push every 12 hours  sucralfate suspension 1 Gram(s) Oral every 6 hours    MEDICATIONS  (PRN):      Allergies    No Known Allergies    Intolerances        Vital Signs Last 24 Hrs  T(C): 36.4 (18 Sep 2020 05:07), Max: 37.1 (17 Sep 2020 15:34)  T(F): 97.5 (18 Sep 2020 05:07), Max: 98.8 (17 Sep 2020 15:34)  HR: 87 (18 Sep 2020 05:07) (87 - 113)  BP: 146/60 (18 Sep 2020 05:07) (129/51 - 187/73)  BP(mean): --  RR: 18 (18 Sep 2020 05:07) (17 - 20)  SpO2: 98% (18 Sep 2020 05:07) (96% - 100%)    PHYSICAL EXAM:  General: NAD.  CVS: S1, S2  Chest: air entry bilaterally present  Abd: BS present, soft, non-tender      LABS:                        7.2    20.83 )-----------( 38       ( 18 Sep 2020 07:52 )             21.3     09-18    148<H>  |  117<H>  |  87<H>  ----------------------------<  131<H>  3.1<L>   |  22  |  1.02    Ca    8.0<L>      18 Sep 2020 07:52  Phos  3.3     09-18  Mg     3.0     09-18    TPro  5.2<L>  /  Alb  2.7<L>  /  TBili  1.0  /  DBili  x   /  AST  19  /  ALT  12  /  AlkPhos  32<L>  09-17    PT/INR - ( 17 Sep 2020 09:59 )   PT: 13.6 sec;   INR: 1.18 ratio           transfuse 2u prbc's  start Rocephin 1g IVPB q24h  follow CBC  IV protonix - will change to continuous infusion  carafate liquid q6h

## 2020-09-18 NOTE — CONSULT NOTE ADULT - ASSESSMENT
IR consulted for bone marrow biopsy for pancytopenia diagnosed approx 1 year prior, never worked up).    Pt found to have symptomatic anemia (s/p several transfusions) and thrombocytopenia.  Also GI bleed s/p EGD (found to have hemorrhagic gastritis and gastric ulcers).  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  Per daughter also has prostate cancer and ?breast cancer (a lump was removed from his breast).  Pts vitals stable, PRBC infusing at this time  No active bleeding noted    Plan  -will perform bone marrow biopsy today  -meds, labs and vitals reviewed  -Case d/w patient and Daughter Marilyn, who gave consent
Pancytopenia
Impression:     80M with PMHx of colon cancer (2010), fibrosarcoma of the foot, ulcerative colitis, chronic anemia, HTN presenting with abdominal pain and diarrhea x 2 weeks with blood in stool,    admitted for   Rectal bleeding , etiology ?  Anemia, secondary to blood loss vs chronic disease   Colitis,  etiology?.   Acute Renal Insufficiency    CT findings of prominent appendix, surgery consulted to r/o acute appendicitis. Patient with benign abdominal exam,   Doubt Acute Appendicitis.      Plan:   -Admit to medicine   -Recommend NPO, IVF, empiric IV Antibiotics for colitis  -Recommend GI consult for possible EGD/Colonoscopy  -Protonix BID  -Serial CBCs, f/u H/H post transfusion. Transfuse PRBC  PRN per medical team  -Serial abd exams  -Consider Renal Consult  -OOB/ambulate as tolerated   -continue medical management and supportive care   -Consider CTA abdomen/pelvis or Bleeding Scan for active GI bleeding, and possible IR intervention if gross Rectal bleeding.  -F/u labs  -discussed with Dr. Campbell

## 2020-09-18 NOTE — CONSULT NOTE ADULT - SUBJECTIVE AND OBJECTIVE BOX
Reason for Consultation: pancytopenia    HPI: Patient is a 80y Male seen on consultatioin for the evaluation and management of pancytopenia    81yo M w/ PMH HTN BIBA weakness. Per report: daughter called EMS d/t 2 days of watery, non-bloody diarrhea, worsening weakness. Questionable hx of fall this AM? Pt is not on AC. Pt endorses lower abd pain, but no nausea vomiting     Patient c/o abdominal pain and diarrhea x approximate 2 weeks. Pain began about 2 weeks ago along with nausea, vomiting, and loss of appetite. Pt's daughter noticed that stool was bloody, admits to "red blood". Denies h/o similar incident before.  Denies fever, chills, SOB, dyspnea, coughing, chest pain.   Patient reports history of colon cancer s/p colectomy and primary anastomosis via  at [facility] in . Last f/u with oncologist was 1 year ago with no concerning findings per patient.   States his last colonoscopy was 2-3 years ago, was found to have polyps but no other findings per patient.    Patient states has had blood counts for a while now, never saw hematologist as outpatient    PAST MEDICAL & SURGICAL HISTORY:  Hypertension        MEDICATIONS  (STANDING):  bisacodyl 10 milliGRAM(s) Oral at bedtime  cefTRIAXone   IVPB      cefTRIAXone   IVPB 1000 milliGRAM(s) IV Intermittent once  dextrose 5% + sodium chloride 0.45%. 1000 milliLiter(s) (60 mL/Hr) IV Continuous <Continuous>  pantoprazole Infusion 8 mG/Hr (10 mL/Hr) IV Continuous <Continuous>  potassium chloride  10 mEq/100 mL IVPB 10 milliEquivalent(s) IV Intermittent every 1 hour  sucralfate suspension 1 Gram(s) Oral every 6 hours    MEDICATIONS  (PRN):      Allergies    No Known Allergies    Intolerances        SOCIAL HISTORY:    Smoking Status: no  Alcohol: no  Marital Status: no  Occupation: no          Vital Signs Last 24 Hrs  T(C): 36.4 (18 Sep 2020 05:07), Max: 37.1 (17 Sep 2020 15:34)  T(F): 97.5 (18 Sep 2020 05:07), Max: 98.8 (17 Sep 2020 15:34)  HR: 87 (18 Sep 2020 05:07) (87 - 113)  BP: 146/60 (18 Sep 2020 05:07) (129/51 - 187/73)  BP(mean): --  RR: 18 (18 Sep 2020 05:07) (17 - 20)  SpO2: 98% (18 Sep 2020 05:07) (96% - 100%)    PHYSICAL EXAM:    general - AAO x 3  HEENT - No Icterus  CVS - RRR  RS - AE B/L  Abd - soft, NT  Ext - Pulses +        LABS:                        7.2    20.83 )-----------( 38       ( 18 Sep 2020 07:52 )             21.3     09-18    148<H>  |  117<H>  |  87<H>  ----------------------------<  131<H>  3.1<L>   |  22  |  1.02    Ca    8.0<L>      18 Sep 2020 07:52  Phos  3.3     09-18  Mg     3.0     18    TPro  5.2<L>  /  Alb  2.7<L>  /  TBili  1.0  /  DBili  x   /  AST  19  /  ALT  12  /  AlkPhos  32<L>  09-17    PT/INR - ( 17 Sep 2020 09:59 )   PT: 13.6 sec;   INR: 1.18 ratio           Urinalysis Basic - ( 17 Sep 2020 01:11 )    Color: Yellow / Appearance: Clear / S.015 / pH: x  Gluc: x / Ketone: Trace  / Bili: Negative / Urobili: Negative mg/dL   Blood: x / Protein: Negative mg/dL / Nitrite: Negative   Leuk Esterase: Negative / RBC: x / WBC x   Sq Epi: x / Non Sq Epi: x / Bacteria: x        Culture - Blood (collected 16 Sep 2020 14:04)  Source: .Blood Blood-Peripheral  Preliminary Report (17 Sep 2020 15:01):    No growth to date.    Culture - Blood (collected 16 Sep 2020 14:04)  Source: .Blood Blood-Peripheral  Preliminary Report (17 Sep 2020 15:01):    No growth to date.        RADIOLOGY & ADDITIONAL STUDIES:

## 2020-09-18 NOTE — CONSULT NOTE ADULT - SUBJECTIVE AND OBJECTIVE BOX
IR consulted for bone marrow biopsy for pancytopenia diagnosed approx 1 year prior, never worked up).     HPI:  81yo M w/ PMH HTN BIBA weakness. Per report: daughter called EMS d/t 2 days of watery, non-bloody diarrhea, worsening weakness. Questionable hx of fall this AM? Pt is not on AC. Pt endorses lower abd pain, but no nausea vomiting  (16 Sep 2020 13:02)          PAST MEDICAL & SURGICAL HISTORY:  Hypertension        Allergies    No Known Allergies    Intolerances        MEDICATIONS  (STANDING):  bisacodyl 10 milliGRAM(s) Oral at bedtime  cefTRIAXone   IVPB      dextrose 5% + sodium chloride 0.45%. 1000 milliLiter(s) (60 mL/Hr) IV Continuous <Continuous>  fentaNYL    Injectable 25 MICROGram(s) IV Push once  pantoprazole Infusion 8 mG/Hr (10 mL/Hr) IV Continuous <Continuous>  sucralfate suspension 1 Gram(s) Oral every 6 hours    MEDICATIONS  (PRN):        SOCIAL HISTORY:    FAMILY HISTORY:        PHYSICAL EXAM:    Vital Signs Last 24 Hrs  T(C): 37.1 (18 Sep 2020 13:18), Max: 37.1 (17 Sep 2020 15:34)  T(F): 98.7 (18 Sep 2020 13:18), Max: 98.8 (17 Sep 2020 15:34)  HR: 90 (18 Sep 2020 13:18) (87 - 113)  BP: 148/61 (18 Sep 2020 13:18) (129/51 - 187/73)  BP(mean): --  RR: 18 (18 Sep 2020 13:18) (17 - 20)  SpO2: 100% (18 Sep 2020 13:18) (96% - 100%)    General:  Appears stated age, well-groomed, well-nourished, no distress  Lungs:  CTAB  Cardiovascular:  good S1, S2,   Abdomen:  Soft, non-tender, non-distended,   Extremities:  no calf tenderness/swelling b/l  Neuro/Psych:  Alert    LABS:                        7.2    20.83 )-----------( 38       ( 18 Sep 2020 07:52 )             21.3     09-    148<H>  |  117<H>  |  87<H>  ----------------------------<  131<H>  3.1<L>   |  22  |  1.02    Ca    8.0<L>      18 Sep 2020 07:52  Phos  3.3       Mg     3.0         TPro  5.2<L>  /  Alb  2.7<L>  /  TBili  1.0  /  DBili  x   /  AST  19  /  ALT  12  /  AlkPhos  32<L>      PT/INR - ( 17 Sep 2020 09:59 )   PT: 13.6 sec;   INR: 1.18 ratio           Urinalysis Basic - ( 17 Sep 2020 01:11 )    Color: Yellow / Appearance: Clear / S.015 / pH: x  Gluc: x / Ketone: Trace  / Bili: Negative / Urobili: Negative mg/dL   Blood: x / Protein: Negative mg/dL / Nitrite: Negative   Leuk Esterase: Negative / RBC: x / WBC x   Sq Epi: x / Non Sq Epi: x / Bacteria: x        RADIOLOGY & ADDITIONAL STUDIES:  < from: CT Abdomen and Pelvis No Cont (20 @ 11:36) >  Mild colonic wall thickening, question mild colitis.  No bowel obstruction.  Prominent appendix without periappendiceal inflammatory changes. Recommend clinical correlation to exclude early appendicitis.    < end of copied text >

## 2020-09-18 NOTE — PROGRESS NOTE ADULT - PROBLEM SELECTOR PLAN 2
acute blood loss anemia from gib-s/p 3 units of blood    for egd and colonoscopy on today     9/18/2020 egd c/w gastric ulcer  , hgb this am low will transfuse 2 more units prbc     continue with Protonix   tumor markers noted and wnl

## 2020-09-18 NOTE — CONSULT NOTE ADULT - PROBLEM SELECTOR RECOMMENDATION 9
- Pancytopenia for at least one year, states he was told by his Oncologist about it and his PMD, never worked-up  - Patient cannot remember name of his oncologist or PMD at this time  - Will order work-up  - will arrnage for BM Biopsy for pancytopenia  - watch Blood work, and transfuse as necessary

## 2020-09-19 NOTE — PROGRESS NOTE ADULT - SUBJECTIVE AND OBJECTIVE BOX
s/p BM Biopsy    Vital Signs Last 24 Hrs  T(C): 36.9 (19 Sep 2020 05:18), Max: 37.4 (19 Sep 2020 00:00)  T(F): 98.4 (19 Sep 2020 05:18), Max: 99.3 (19 Sep 2020 00:00)  HR: 79 (19 Sep 2020 05:18) (75 - 97)  BP: 157/76 (19 Sep 2020 05:18) (125/64 - 176/62)  BP(mean): --  RR: 18 (19 Sep 2020 05:18) (16 - 19)  SpO2: 100% (19 Sep 2020 05:18) (98% - 100%)    PHYSICAL EXAM:    general - weak looking +  HEENT - No Icterus  CVS - RRR  RS - AE B/L  Abd - soft, NT  Ext - Pulses +        LABS:                        8.8    13.99 )-----------( 39       ( 19 Sep 2020 07:40 )             26.2     09-19    147<H>  |  118<H>  |  53<H>  ----------------------------<  136<H>  3.4<L>   |  20<L>  |  0.93    Ca    7.9<L>      19 Sep 2020 07:40  Phos  2.1     09-19  Mg     3.1     09-19    TPro  5.5<L>  /  Alb  2.7<L>  /  TBili  1.2  /  DBili  .25<H>  /  AST  22  /  ALT  32  /  AlkPhos  42  09-19          Culture - Urine (collected 17 Sep 2020 09:03)  Source: .Urine Clean Catch (Midstream)  Final Report (19 Sep 2020 08:46):    >=3 organisms. Probable collection contamination.    Culture - Blood (collected 16 Sep 2020 14:04)  Source: .Blood Blood-Peripheral  Preliminary Report (17 Sep 2020 15:01):    No growth to date.    Culture - Blood (collected 16 Sep 2020 14:04)  Source: .Blood Blood-Peripheral  Preliminary Report (17 Sep 2020 15:01):    No growth to date.        RADIOLOGY & ADDITIONAL STUDIES:

## 2020-09-19 NOTE — PHYSICAL THERAPY INITIAL EVALUATION ADULT - GAIT DEVIATIONS NOTED, PT EVAL
decreased step length/decreased stride length/decreased dahlia/shuffles and drags the lower extremities

## 2020-09-19 NOTE — PHYSICAL THERAPY INITIAL EVALUATION ADULT - STRENGTHENING, PT EVAL
Improve strength in the UE and LE to 4+/5 or 5/5 and be able to perform functional tasks-bed mobility, sitting, standing, transfers and ambulate in a safe manner with or without  assistive device and prevent falls.

## 2020-09-19 NOTE — PHYSICAL THERAPY INITIAL EVALUATION ADULT - BED MOBILITY TRAINING, PT EVAL
Independent in bed mobility- supine<>sit, rolling side<>side observing proper body mechanics, proper positioning, body alignment and precautions.

## 2020-09-19 NOTE — PROGRESS NOTE ADULT - SUBJECTIVE AND OBJECTIVE BOX
INTERVAL HPI/OVERNIGHT EVENTS:  Pt. seen and examined at bedside resting comfortably. No complaints offered, denies abdominal pain, nausea or vomiting. Tolerating liquid diet. +flatus, no BM today.   Denies fever/chills, chest pain, dyspnea, cough, dizziness.     Vital Signs Last 24 Hrs  T(C): 36.9 (19 Sep 2020 05:18), Max: 37.4 (19 Sep 2020 00:00)  T(F): 98.4 (19 Sep 2020 05:18), Max: 99.3 (19 Sep 2020 00:00)  HR: 79 (19 Sep 2020 05:18) (75 - 90)  BP: 157/76 (19 Sep 2020 05:18) (125/64 - 176/62)  RR: 18 (19 Sep 2020 05:18) (16 - 19)  SpO2: 100% (19 Sep 2020 05:18) (98% - 100%)    PHYSICAL EXAM:    GENERAL: NAD  CHEST/LUNG: Clear to ausculation, bilaterally   HEART: S1S2, RRR  ABDOMEN: non distended, +BS, soft, non tender, no guarding  EXTREMITIES:  calf soft, non tender b/l. 2+ distal pulses b/l.     I&O's Detail    19 Sep 2020 07:01  -  19 Sep 2020 13:39  --------------------------------------------------------  IN:    Oral Fluid: 570 mL  Total IN: 570 mL    OUT:  Total OUT: 0 mL    Total NET: 570 mL      LABS:                        8.8    13.99 )-----------( 39       ( 19 Sep 2020 07:40 )             26.2     09-19    147<H>  |  118<H>  |  53<H>  ----------------------------<  136<H>  3.4<L>   |  20<L>  |  0.93    Ca    7.9<L>      19 Sep 2020 07:40  Phos  2.1     09-19  Mg     3.1     09-19    TPro  5.3<L>  /  Alb  x   /  TBili  x   /  DBili  x   /  AST  x   /  ALT  x   /  AlkPhos  x   09-19    Culture Results:   >=3 organisms. Probable collection contamination. (09-17 @ 09:03)  Culture Results:   No growth to date. (09-16 @ 14:04)  Culture Results:   No growth to date. (09-16 @ 14:04)      Impression:   80M with PMHx of colon cancer (2010), fibrosarcoma of the foot, ulcerative colitis, chronic anemia, HTN presenting with abdominal pain and diarrhea x 2 weeks with blood in stool,    admitted for ABLA 2/2 GIB, Colitis, ANNEMARIE (improving). S/p EGD 9/17, found to have Hemorrhagic Gastritis; Gastric Ulcers   Surgery consulted to r/o acute appendicitis. Patient with benign abdominal exam, doubt appendicitis.  S/p 2u PRBC 9/18      Plan:   -Monitor for s/s of bleeding, monitor H/H, transfuse PRN  -Recommend empiric IV Antibiotics for colitis  -GI follow up, diet per GI  -Protonix  -Serial abd exams  -Pain control PRN  -OOB/ambulate as tolerated   -Heme/onc follow up for pancytopenia, IR BM bx done 9/18, f/u path  -continue medical management and supportive care   -Consider CTA abdomen/pelvis or Bleeding Scan for active GI bleeding, and possible IR intervention if gross Rectal bleeding.  Will d/w Surgical attending INTERVAL HPI/OVERNIGHT EVENTS:  Pt. seen and examined at bedside resting comfortably. No complaints offered, denies abdominal pain, nausea or vomiting. Tolerating liquid diet. +flatus, no BM today.   Denies fever/chills, chest pain, dyspnea, cough, dizziness.   S/P Bone Marrow Bx. secundary to Pancytopenia      Vital Signs Last 24 Hrs  T(C): 36.9 (19 Sep 2020 05:18), Max: 37.4 (19 Sep 2020 00:00)  T(F): 98.4 (19 Sep 2020 05:18), Max: 99.3 (19 Sep 2020 00:00)  HR: 79 (19 Sep 2020 05:18) (75 - 90)  BP: 157/76 (19 Sep 2020 05:18) (125/64 - 176/62)  RR: 18 (19 Sep 2020 05:18) (16 - 19)  SpO2: 100% (19 Sep 2020 05:18) (98% - 100%)    PHYSICAL EXAM:    GENERAL: NAD  CHEST/LUNG: Clear to ausculation, bilaterally   HEART: S1S2, RRR  ABDOMEN: non distended, +BS, soft, non tender, no guarding  EXTREMITIES:  calf soft, non tender b/l. 2+ distal pulses b/l.     I&O's Detail    19 Sep 2020 07:01  -  19 Sep 2020 13:39  --------------------------------------------------------  IN:    Oral Fluid: 570 mL  Total IN: 570 mL    OUT:  Total OUT: 0 mL    Total NET: 570 mL      LABS:                        8.8    13.99 )-----------( 39       ( 19 Sep 2020 07:40 )             26.2     09-19    147<H>  |  118<H>  |  53<H>  ----------------------------<  136<H>  3.4<L>   |  20<L>  |  0.93    Ca    7.9<L>      19 Sep 2020 07:40  Phos  2.1     09-19  Mg     3.1     09-19    TPro  5.3<L>  /  Alb  x   /  TBili  x   /  DBili  x   /  AST  x   /  ALT  x   /  AlkPhos  x   09-19    Culture Results:   >=3 organisms. Probable collection contamination. (09-17 @ 09:03)  Culture Results:   No growth to date. (09-16 @ 14:04)  Culture Results:   No growth to date. (09-16 @ 14:04)      Impression:   80M with PMHx of colon cancer (2010), fibrosarcoma of the foot, ulcerative colitis, chronic anemia, HTN presenting with abdominal pain and diarrhea x 2 weeks with blood in stool,    admitted for ABLA 2/2 GIB,   Colitis,   ANNEMARIE Improved   S/p EGD 9/17, found to have Hemorrhagic Gastritis; Gastric Ulcers   S/p 2u PRBC 9/18  Hypokalemia  Hypophosphatemia.  S/P Bone marrow Bx.      Plan:   -Monitor for s/s of bleeding, monitor H/H, transfuse PRN  -Recommend empiric IV Antibiotics for colitis  -GI follow up, diet per GI  -Protonix  -Serial abd exams  -Pain control PRN  -OOB/ambulate as tolerated   -Heme/onc follow up for pancytopenia, IR BM bx done 9/18, f/u path  -continue medical management and supportive care   -Consider CTA abdomen/pelvis or Bleeding Scan for active GI bleeding, and possible IR intervention if gross Rectal bleeding.  -d/w Surgical attending

## 2020-09-19 NOTE — PROGRESS NOTE ADULT - PROBLEM SELECTOR PLAN 5
unclear will get heme consult  9/19/2020 per daughter r longstanding never had biopsy on 9/18/2020 s/p BM biopsy follow up results

## 2020-09-19 NOTE — PROGRESS NOTE ADULT - PROBLEM SELECTOR PLAN 1
- long standing pancytopenia as per patient, never worked up  - s/p BM biopsy - await results  - f/u work-up ordered  - watch blood counts and transfuse as necessary

## 2020-09-19 NOTE — PROGRESS NOTE ADULT - PROBLEM SELECTOR PLAN 2
acute blood loss anemia from gib-s/p 3 units of blood    for egd and colonoscopy on today     9/18/2020 egd c/w gastric ulcer  , hgb this am low will transfuse 2 more units prbc   continue with Protonix    9/19/2020 hgb adequate response, continue to montior and transfuse appropriately    tumor markers noted and wnl acute blood loss anemia from gib-s/p 3 units of blood    for egd and colonoscopy on today     9/18/2020 egd c/w gastric ulcer  , hgb this am low will transfuse 2 more units prbc   continue with Protonix    9/19/2020 hgb adequate response, continue to montior and transfuse appropriately    tumor markers noted and wnl colitis on CT scan continue with antibx jake add Flagyl

## 2020-09-19 NOTE — PHYSICAL THERAPY INITIAL EVALUATION ADULT - LIVES WITH, PROFILE
Pt states that he lives in a pvt house with family members, 4 steps with rails to enter the house, 1 flight to go to the basement.

## 2020-09-19 NOTE — PHYSICAL THERAPY INITIAL EVALUATION ADULT - PERTINENT HX OF CURRENT PROBLEM, REHAB EVAL
Pt admitted with dx Anemia unspecified type, brought via EMS with non bloody diarrhea, increasing weakness, lower abdominal pain, nausea and vomiting.

## 2020-09-19 NOTE — PHYSICAL THERAPY INITIAL EVALUATION ADULT - IMPAIRMENTS FOUND, PT EVAL
ergonomics and body mechanics/gait, locomotion, and balance/aerobic capacity/endurance/muscle strength

## 2020-09-19 NOTE — PROGRESS NOTE ADULT - SUBJECTIVE AND OBJECTIVE BOX
Patient is a 80y old  Male who presents with a chief complaint of     OVERNIGHT EVENTS: none   MEDICATIONS  (STANDING):  bisacodyl 10 milliGRAM(s) Oral at bedtime  cefTRIAXone   IVPB      cefTRIAXone   IVPB 1000 milliGRAM(s) IV Intermittent every 24 hours  dextrose 5% + sodium chloride 0.45%. 1000 milliLiter(s) (60 mL/Hr) IV Continuous <Continuous>  pantoprazole Infusion 8 mG/Hr (10 mL/Hr) IV Continuous <Continuous>  sucralfate suspension 1 Gram(s) Oral every 6 hours    MEDICATIONS  (PRN):    Allergies    No Known Allergies    Intolerances        SUBJECTIVE: in bed in NAD, no acute events overnight  noted    Vital Signs Last 24 Hrs  T(C): 36.9 (19 Sep 2020 05:18), Max: 37.4 (19 Sep 2020 00:00)  T(F): 98.4 (19 Sep 2020 05:18), Max: 99.3 (19 Sep 2020 00:00)  HR: 79 (19 Sep 2020 05:18) (75 - 97)  BP: 157/76 (19 Sep 2020 05:18) (125/64 - 176/62)  BP(mean): --  RR: 18 (19 Sep 2020 05:18) (16 - 19)  SpO2: 100% (19 Sep 2020 05:18) (98% - 100%)      PHYSICAL EXAM:  GENERAL: NAD, well-groomed, well-developed  HEAD:  Atraumatic, Normocephalic  EYES: EOMI, PERRLA, conjunctiva and sclera clear  ENMT: No tonsillar erythema, exudates, or enlargement; Moist mucous membranes, Good dentition, No lesions  NECK: Supple, No JVD, Normal thyroid  CHEST/LUNG: Clear to  auscultation bilaterally; No rales, rhonchi, wheezing, or rubs  bilaterally  HEART: Regular rate and rhythm; No murmurs, rubs, or gallops  ABDOMEN: Soft, Nontender, Nondistended; Bowel sounds present  EXTREMITIES:  2+ Peripheral Pulses, No clubbing, cyanosis, or edema BL LE  SKIN: No rashes or lesions  NERVOUS SYSTEM:  Alert & confused  s./p left tma   DTRs 2+ intact and symmetric, sensation intact BL    LABS:                                        8.8    13.99 )-----------( 39       ( 19 Sep 2020 07:40 )             26.2   09-19    147<H>  |  118<H>  |  53<H>  ----------------------------<  136<H>  3.4<L>   |  20<L>  |  0.93    Ca    7.9<L>      19 Sep 2020 07:40  Phos  2.1       Mg     3.1         TPro  5.5<L>  /  Alb  2.7<L>  /  TBili  1.2  /  DBili  .25<H>  /  AST  22  /  ALT  32  /  AlkPhos  42         PTT - ( 16 Sep 2020 09:50 )  PTT:31.4 sec  Urinalysis Basic - ( 17 Sep 2020 01:11 )    Color: Yellow / Appearance: Clear / S.015 / pH: x  Gluc: x / Ketone: Trace  / Bili: Negative / Urobili: Negative mg/dL   Blood: x / Protein: Negative mg/dL / Nitrite: Negative   Leuk Esterase: Negative / RBC: x / WBC x   Sq Epi: x / Non Sq Epi: x / Bacteria: x      Cultures;   CAPILLARY BLOOD GLUCOSE        Lipid panel:     CARDIAC MARKERS ( 16 Sep 2020 09:50 )  .046 ng/mL / x     / x     / x     / x            RADIOLOGY & ADDITIONAL TESTS:      Imaging Personally Reviewed:  [ x] YES      Consultant(s) Notes Reviewed:  [x ] YES     Care Discussed with [x ] Consultants [X ] Patient [x ] Family  [x ]    [x ]  Other; RN

## 2020-09-19 NOTE — PHYSICAL THERAPY INITIAL EVALUATION ADULT - GAIT TRAINING, PT EVAL
Pt will be able to ambulate using assistive device up to 200 ft or more,  observing proper gait, posture and prevent falls.

## 2020-09-20 NOTE — PROGRESS NOTE ADULT - SUBJECTIVE AND OBJECTIVE BOX
Chief Complaint:  Patient is a 80y old  Male who presents with a chief complaint of hypotension, anemia, melena    Interval Events:   Daughter at bedside  Tolerating PO  1-2 semi-formed stools, does not know color  Denies fever, nausea, abdominal pain, pain at bone marrow biopsy site  Hb downtrending, platelets: 37    Allergies:  No Known Allergies    Hospital Medications:  bisacodyl 10 milliGRAM(s) Oral at bedtime  carvedilol 25 milliGRAM(s) Oral every 12 hours  cefTRIAXone   IVPB 1000 milliGRAM(s) IV Intermittent every 24 hours  cefTRIAXone   IVPB      folic acid 1 milliGRAM(s) Oral daily  metroNIDAZOLE  IVPB      metroNIDAZOLE  IVPB 500 milliGRAM(s) IV Intermittent every 8 hours  pantoprazole Infusion 8 mG/Hr IV Continuous <Continuous>  potassium chloride  10 mEq/100 mL IVPB 10 milliEquivalent(s) IV Intermittent every 1 hour  sucralfate suspension 1 Gram(s) Oral every 6 hours      PMHX/PSHX:  Hypertension    Family history:    Daughter: "pre-myeloma"    ROS:     General:  Weak, no fever  Eyes:  Good vision  ENT:  No odynophagia, dysphagia  CV:  No pain, palpitations  Resp:  No dyspnea, cough, tachypnea, wheezing  GI:  See HPI  Muscle:  No pain, weakness  Skin:  No rash, edema      PHYSICAL EXAM:     GENERAL:  No distress  HEENT: conjunctivae clear  CHEST:  Full & symmetric excursion, no increased effort  HEART:  Regular rhythm  ABDOMEN:  Soft, non-tender, non-distended  EXTREMITIES:  no edema  SKIN:  Dry/warm  NEURO:  Alert    Vital Signs:  Vital Signs Last 24 Hrs  T(C): 37 (20 Sep 2020 12:10), Max: 37.2 (20 Sep 2020 05:33)  T(F): 98.6 (20 Sep 2020 12:10), Max: 99 (20 Sep 2020 05:33)  HR: 75 (20 Sep 2020 12:10) (75 - 84)  BP: 151/59 (20 Sep 2020 12:10) (135/78 - 156/68)  BP(mean): --  RR: 18 (20 Sep 2020 12:10) (17 - 18)  SpO2: 100% (20 Sep 2020 12:10) (98% - 100%)  Daily     Daily Weight in k.4 (20 Sep 2020 05:33)    LABS:                        7.6    10.75 )-----------( 37       ( 20 Sep 2020 07:38 )             21.7         141  |  113<H>  |  27<H>  ----------------------------<  128<H>  3.3<L>   |  21<L>  |  0.70    Ca    7.4<L>      20 Sep 2020 07:38  Phos  2.0       Mg     2.5         TPro  5.3<L>  /  Alb  x   /  TBili  x   /  DBili  x   /  AST  x   /  ALT  x   /  AlkPhos  x       LIVER FUNCTIONS - ( 19 Sep 2020 12:43 )  Alb: x     / Pro: 5.3 g/dL / ALK PHOS: x     / ALT: x     / AST: x     / GGT: x                   Imaging:

## 2020-09-20 NOTE — PROGRESS NOTE ADULT - PROBLEM SELECTOR PLAN 5
unclear will get heme consult  9/19/2020 per daughter  longstanding never had biopsy on 9/18/2020 s/p BM biopsy follow up results

## 2020-09-20 NOTE — PROGRESS NOTE ADULT - ASSESSMENT
79 yo Male h/o HTN, colon ca (2010) s/p colon resection, fibrosarcoma of right foot presenting with hypotension, acute blood loss anemia secondary, melena.    1.  Acute blood loss anemia secondary to melena in setting of thrombocytopenia.  EGD with esophagitis and hemorrhagic gastritis.  2.  Thrombocytopenia, anemia status post bone marrow biopsy.  3.  Leukocytosis.  4.  History of colon cancer status post colon resection (2010).  5.  History of fibrosarcoma status post foot surgery.    Recs:  - Monitor CBC and bowel movements.  Transfuse to Hb >8.  - Continue Protonix gtt and sucralfate for hemorrhagic gastritis.  - Consider platelet transfusion to platelets > 50K if ongoing bleeding.  - Follow up bone marrow biopsy results.  - Advance diet as tolerated.

## 2020-09-20 NOTE — PROGRESS NOTE ADULT - SUBJECTIVE AND OBJECTIVE BOX
Patient seen and examined bedside resting comfortably.  No complaints offered. No new rectal bleeding.  Per RN, patient has been having small BMs described as soft and dark.  Pt denies n/v and abdominal pain. Requests additional milk to drink.    T(F): 98.6 (09-20-20 @ 12:10), Max: 99 (09-20-20 @ 05:33)  HR: 75 (09-20-20 @ 12:10) (75 - 84)  BP: 151/59 (09-20-20 @ 12:10) (135/78 - 156/68)  RR: 18 (09-20-20 @ 12:10) (18 - 18)  SpO2: 100% (09-20-20 @ 12:10) (98% - 100%)  Wt(kg): --    PHYSICAL EXAM:  General: NAD  HEENT: NCAT, EOMI  Chest: respirations nonlabored  Abdomen: soft, NTND.   Extremities: no pedal edema. L foot amputation    LABS:                        7.6    10.75 )-----------( 37       ( 20 Sep 2020 07:38 )             21.7     09-20    141  |  113<H>  |  27<H>  ----------------------------<  128<H>  3.3<L>   |  21<L>  |  0.70    Ca    7.4<L>      20 Sep 2020 07:38  Phos  2.0     09-20  Mg     2.5     09-20    TPro  5.3<L>  /  Alb  x   /  TBili  x   /  DBili  x   /  AST  x   /  ALT  x   /  AlkPhos  x   09-19      Culture Results:   >=3 organisms. Probable collection contamination. (09-17 @ 09:03)  Culture Results:   No growth to date. (09-16 @ 14:04)  Culture Results:   No growth to date. (09-16 @ 14:04)      Impression/Plan: 80M PMH colon cancer (2010), fibrosarcoma of the foot, ulcerative colitis, chronic anemia, HTN     admitted with ABLA 2/2 GIB, Colitis, ANNEMARIE (resolved)  S/p EGD 9/17, found to have Hemorrhagic Gastritis; Gastric Ulcers   S/p 2u PRBC 9/18, with +melena  status post bone marrow biopsy on 9/18.  -Monitor for s/s of bleeding, monitor H/H, transfuse PRN.   -Continue protonix gtt and carafate  -Follow up bone marrow biopsy results.  -Advance diet as tolerated as per GI  -c/w abx for colitis  -GI follow up, recommend colonoscopy per Dr Campbell     Patient seen and examined bedside resting comfortably.  No complaints offered. No new rectal bleeding.  Per RN, patient has been having small BMs described as soft and dark.  Pt denies n/v and abdominal pain. Requests additional milk to drink.    T(F): 98.6 (09-20-20 @ 12:10), Max: 99 (09-20-20 @ 05:33)  HR: 75 (09-20-20 @ 12:10) (75 - 84)  BP: 151/59 (09-20-20 @ 12:10) (135/78 - 156/68)  RR: 18 (09-20-20 @ 12:10) (18 - 18)  SpO2: 100% (09-20-20 @ 12:10) (98% - 100%)  Wt(kg): --    PHYSICAL EXAM:  General: NAD  HEENT: NCAT, EOMI  Chest: respirations nonlabored  Abdomen: soft, NTND.   Extremities: no pedal edema. L foot amputation    LABS:                        7.6    10.75 )-----------( 37       ( 20 Sep 2020 07:38 )             21.7     09-20    141  |  113<H>  |  27<H>  ----------------------------<  128<H>  3.3<L>   |  21<L>  |  0.70    Ca    7.4<L>      20 Sep 2020 07:38  Phos  2.0     09-20  Mg     2.5     09-20    TPro  5.3<L>  /  Alb  x   /  TBili  x   /  DBili  x   /  AST  x   /  ALT  x   /  AlkPhos  x   09-19      Culture Results:   >=3 organisms. Probable collection contamination. (09-17 @ 09:03)  Culture Results:   No growth to date. (09-16 @ 14:04)  Culture Results:   No growth to date. (09-16 @ 14:04)      Impression/Plan:     80M PMH colon cancer (2010), fibrosarcoma of the foot, ulcerative colitis, chronic anemia, HTN     admitted with ABLA 2/2 GIB, Colitis,   ANNEMARIE (resolved)  S/p EGD 9/17, found to have Hemorrhagic Gastritis; Gastric Ulcers   S/p 2u PRBC 9/18, with +melena.  Hypokalemia  Hypophosphatemia.  status post bone marrow biopsy on 9/18.  -Monitor for s/s of bleeding, monitor H/H, transfuse PRN.   -Continue protonix gtt and carafate  -Follow up bone marrow biopsy results.  -Advance diet as tolerated as per GI  -c/w abx for colitis  -GI follow up, recommend colonoscopy per Dr Campbell

## 2020-09-20 NOTE — PROGRESS NOTE ADULT - SUBJECTIVE AND OBJECTIVE BOX
lying in bed, weak looking +    Vital Signs Last 24 Hrs  T(C): 37.2 (20 Sep 2020 05:33), Max: 37.2 (20 Sep 2020 05:33)  T(F): 99 (20 Sep 2020 05:33), Max: 99 (20 Sep 2020 05:33)  HR: 84 (20 Sep 2020 05:33) (81 - 84)  BP: 135/78 (20 Sep 2020 05:33) (135/78 - 156/68)  BP(mean): --  RR: 18 (20 Sep 2020 05:33) (17 - 18)  SpO2: 100% (20 Sep 2020 05:33) (98% - 100%)    PHYSICAL EXAM:    general - AAO x 3  HEENT - No Icterus  CVS - RRR  RS - AE B/L  Abd - soft, NT  Ext - Pulses +        LABS:                        7.6    10.75 )-----------( 37       ( 20 Sep 2020 07:38 )             21.7     09-20    141  |  113<H>  |  27<H>  ----------------------------<  128<H>  3.3<L>   |  21<L>  |  0.70    Ca    7.4<L>      20 Sep 2020 07:38  Phos  2.0     09-20  Mg     2.5     09-20    TPro  5.3<L>  /  Alb  x   /  TBili  x   /  DBili  x   /  AST  x   /  ALT  x   /  AlkPhos  x   09-19          Culture - Urine (collected 17 Sep 2020 09:03)  Source: .Urine Clean Catch (Midstream)  Final Report (19 Sep 2020 08:46):    >=3 organisms. Probable collection contamination.    Culture - Blood (collected 16 Sep 2020 14:04)  Source: .Blood Blood-Peripheral  Preliminary Report (17 Sep 2020 15:01):    No growth to date.    Culture - Blood (collected 16 Sep 2020 14:04)  Source: .Blood Blood-Peripheral  Preliminary Report (17 Sep 2020 15:01):    No growth to date.        RADIOLOGY & ADDITIONAL STUDIES:

## 2020-09-20 NOTE — PROGRESS NOTE ADULT - SUBJECTIVE AND OBJECTIVE BOX
Patient is a 80y old  Male who presents with a chief complaint of     OVERNIGHT EVENTS: none     MEDICATIONS  (STANDING):  bisacodyl 10 milliGRAM(s) Oral at bedtime  carvedilol 25 milliGRAM(s) Oral every 12 hours  cefTRIAXone   IVPB 1000 milliGRAM(s) IV Intermittent every 24 hours  cefTRIAXone   IVPB      cyanocobalamin Injectable 1000 MICROGram(s) SubCutaneous once  folic acid 1 milliGRAM(s) Oral daily  metroNIDAZOLE  IVPB      metroNIDAZOLE  IVPB 500 milliGRAM(s) IV Intermittent every 8 hours  pantoprazole Infusion 8 mG/Hr (10 mL/Hr) IV Continuous <Continuous>  sucralfate suspension 1 Gram(s) Oral every 6 hours    MEDICATIONS  (PRN):    Allergies    No Known Allergies    Intolerances        SUBJECTIVE: in bed in NAD, no acute events overnight  noted    Vital Signs Last 24 Hrs  T(C): 37.2 (20 Sep 2020 05:33), Max: 37.2 (20 Sep 2020 05:33)  T(F): 99 (20 Sep 2020 05:33), Max: 99 (20 Sep 2020 05:33)  HR: 84 (20 Sep 2020 05:33) (81 - 84)  BP: 135/78 (20 Sep 2020 05:33) (135/78 - 156/68)  BP(mean): --  RR: 18 (20 Sep 2020 05:33) (17 - 18)  SpO2: 100% (20 Sep 2020 05:33) (98% - 100%)      PHYSICAL EXAM:  GENERAL: NAD, well-groomed, well-developed  HEAD:  Atraumatic, Normocephalic  EYES: EOMI, PERRLA, conjunctiva and sclera clear  ENMT: No tonsillar erythema, exudates, or enlargement; Moist mucous membranes, Good dentition, No lesions  NECK: Supple, No JVD, Normal thyroid  CHEST/LUNG: Clear to  auscultation bilaterally; No rales, rhonchi, wheezing, or rubs  bilaterally  HEART: Regular rate and rhythm; No murmurs, rubs, or gallops  ABDOMEN: Soft, Nontender, Nondistended; Bowel sounds present  EXTREMITIES:  2+ Peripheral Pulses, No clubbing, cyanosis, or edema BL LE  SKIN: No rashes or lesions  NERVOUS SYSTEM:  Alert & confused  s./p left tma   DTRs 2+ intact and symmetric, sensation intact BL    LABS:                                                7.6    10.75 )-----------( 37       ( 20 Sep 2020 07:38 )             21.7   09-20    141  |  113<H>  |  27<H>  ----------------------------<  128<H>  3.3<L>   |  21<L>  |  0.70    Ca    7.4<L>      20 Sep 2020 07:38  Phos  2.0       Mg     2.5         TPro  5.3<L>  /  Alb  x   /  TBili  x   /  DBili  x   /  AST  x   /  ALT  x   /  AlkPhos  x        PTT - ( 16 Sep 2020 09:50 )  PTT:31.4 sec  Urinalysis Basic - ( 17 Sep 2020 01:11 )    Color: Yellow / Appearance: Clear / S.015 / pH: x  Gluc: x / Ketone: Trace  / Bili: Negative / Urobili: Negative mg/dL   Blood: x / Protein: Negative mg/dL / Nitrite: Negative   Leuk Esterase: Negative / RBC: x / WBC x   Sq Epi: x / Non Sq Epi: x / Bacteria: x      Cultures;   CAPILLARY BLOOD GLUCOSE        Lipid panel:     CARDIAC MARKERS ( 16 Sep 2020 09:50 )  .046 ng/mL / x     / x     / x     / x            RADIOLOGY & ADDITIONAL TESTS:      Imaging Personally Reviewed:  [ x] YES      Consultant(s) Notes Reviewed:  [x ] YES     Care Discussed with [x ] Consultants [X ] Patient [x ] Family  [x ]    [x ]  Other; RN

## 2020-09-20 NOTE — PROGRESS NOTE ADULT - PROBLEM SELECTOR PLAN 2
acute blood loss anemia from gib-s/p 3 units of blood    for egd and colonoscopy on today     9/18/2020 egd c/w gastric ulcer  , hgb this am low will transfuse 2 more units prbc   continue with Protonix    9/19/2020 hgb adequate response, continue to montior and transfuse appropriately    tumor markers noted and wnl colitis on CT scan continue with antibx jake add Flagyl

## 2020-09-21 NOTE — PROGRESS NOTE ADULT - SUBJECTIVE AND OBJECTIVE BOX
Pt with slowly dropping H/H  Had formed stool  On IV protonix and PO carafte for hemorrhagic esophagitis and gastric ulcer  severe thrombocytopenia        MEDICATIONS  (STANDING):  bisacodyl 10 milliGRAM(s) Oral at bedtime  carvedilol 25 milliGRAM(s) Oral every 12 hours  cefTRIAXone   IVPB 1000 milliGRAM(s) IV Intermittent every 24 hours  cefTRIAXone   IVPB      cyanocobalamin Injectable 1000 MICROGram(s) SubCutaneous daily  folic acid 1 milliGRAM(s) Oral daily  metroNIDAZOLE  IVPB      metroNIDAZOLE  IVPB 500 milliGRAM(s) IV Intermittent every 8 hours  pantoprazole Infusion 8 mG/Hr (10 mL/Hr) IV Continuous <Continuous>  sucralfate suspension 1 Gram(s) Oral every 6 hours    MEDICATIONS  (PRN):  melatonin 3 milliGRAM(s) Oral at bedtime PRN Insomnia      Allergies    No Known Allergies    Intolerances        Vital Signs Last 24 Hrs  T(C): 37.3 (21 Sep 2020 10:54), Max: 37.8 (20 Sep 2020 23:26)  T(F): 99.2 (21 Sep 2020 10:54), Max: 100 (20 Sep 2020 23:26)  HR: 72 (21 Sep 2020 10:54) (72 - 83)  BP: 144/56 (21 Sep 2020 10:54) (144/56 - 157/59)  BP(mean): --  RR: 18 (21 Sep 2020 10:54) (18 - 20)  SpO2: 100% (21 Sep 2020 10:54) (100% - 100%)    PHYSICAL EXAM:  General: NAD.  CVS: S1, S2  Chest: air entry bilaterally present  Abd: BS present, soft, non-tender      LABS:                        7.2    9.70  )-----------( 38       ( 21 Sep 2020 06:57 )             21.8     09-21    140  |  113<H>  |  23  ----------------------------<  110<H>  4.0   |  21<L>  |  0.67    Ca    7.5<L>      21 Sep 2020 06:57  Phos  2.1     09-21  Mg     2.6     09-21      will discuss with Hematology  continue present meds  will consider colonoscopy

## 2020-09-21 NOTE — PROGRESS NOTE ADULT - SUBJECTIVE AND OBJECTIVE BOX
Patient seen and examined bedside resting comfortably.  No new complaints offered. No reports of rectal bleeding.  Denies nausea and vomiting. Tolerating diet.  Flatus/BM. +  Denies chest pain, dyspnea, cough.    T(F): 99.9 (09-21-20 @ 16:36), Max: 100 (09-20-20 @ 23:26)  HR: 72 (09-21-20 @ 16:36) (72 - 83)  BP: 152/87 (09-21-20 @ 16:36) (144/56 - 157/59)  RR: 18 (09-21-20 @ 16:36) (18 - 20)  SpO2: 100% (09-21-20 @ 16:36) (100% - 100%)      PHYSICAL EXAM:  General: NAD. Appears weak.  Neuro:  Alert & oriented x 3  Abdomen: soft, NTND. Normactive BS + BM+    LABS:                        7.2    9.70  )-----------( 38       ( 21 Sep 2020 06:57 )             21.8     09-21    140  |  113<H>  |  23  ----------------------------<  110<H>  4.0   |  21<L>  |  0.67    Ca    7.5<L>      21 Sep 2020 06:57  Phos  2.1     09-21  Mg     2.6     09-21    Culture - Urine (09.20.20 @ 12:08)   Specimen Source: .Urine Clean Catch (Midstream)   Culture Results:   No growth     I&O's Detail    20 Sep 2020 07:01  -  21 Sep 2020 07:00  --------------------------------------------------------  IN:    IV PiggyBack: 200 mL    Oral Fluid: 120 mL    Pantoprazole: 120 mL  Total IN: 440 mL    OUT:  Total OUT: 0 mL    Total NET: 440 mL      21 Sep 2020 07:01  -  21 Sep 2020 17:45  --------------------------------------------------------  IN:    IV PiggyBack: 50 mL    IV PiggyBack: 250 mL    IV PiggyBack: 100 mL  Total IN: 400 mL    OUT:  Total OUT: 0 mL    Total NET: 400 mL      medical; GI f/u noted    iimpr:   anemia, chronic  fibrosarcoma of the foot  h/o ulcerative colitis  BM bx results pending.    Plan:  Continue medical w/u &  tx  Dr. Campbell suggests colonoscopy - Dr. Walton considering this.  No surgical intervention indicated at this time.   Patient seen and examined bedside resting comfortably.  No new complaints offered. No reports of rectal bleeding.  Denies nausea and vomiting. Tolerating diet.  Flatus/BM. +  Denies chest pain, dyspnea, cough.    T(F): 99.9 (09-21-20 @ 16:36), Max: 100 (09-20-20 @ 23:26)  HR: 72 (09-21-20 @ 16:36) (72 - 83)  BP: 152/87 (09-21-20 @ 16:36) (144/56 - 157/59)  RR: 18 (09-21-20 @ 16:36) (18 - 20)  SpO2: 100% (09-21-20 @ 16:36) (100% - 100%)      PHYSICAL EXAM:  General: NAD. Appears weak.  Neuro:  Alert & oriented x 3  Abdomen: soft, NTND. Normactive BS + BM+    LABS:                        7.2    9.70  )-----------( 38       ( 21 Sep 2020 06:57 )             21.8     09-21    140  |  113<H>  |  23  ----------------------------<  110<H>  4.0   |  21<L>  |  0.67    Ca    7.5<L>      21 Sep 2020 06:57  Phos  2.1     09-21  Mg     2.6     09-21    Culture - Urine (09.20.20 @ 12:08)   Specimen Source: .Urine Clean Catch (Midstream)   Culture Results:   No growth     I&O's Detail    20 Sep 2020 07:01  -  21 Sep 2020 07:00  --------------------------------------------------------  IN:    IV PiggyBack: 200 mL    Oral Fluid: 120 mL    Pantoprazole: 120 mL  Total IN: 440 mL    OUT:  Total OUT: 0 mL    Total NET: 440 mL      21 Sep 2020 07:01  -  21 Sep 2020 17:45  --------------------------------------------------------  IN:    IV PiggyBack: 50 mL    IV PiggyBack: 250 mL    IV PiggyBack: 100 mL  Total IN: 400 mL    OUT:  Total OUT: 0 mL    Total NET: 400 mL      medical; GI f/u noted    impression:    anemia ,   Pancytopenia   fibrosarcoma of the foot  h/o ulcerative colitis  BM bx results pending.    Plan:  Continue medical w/u &  tx  Dr. Campbell suggests colonoscopy - Dr. Walton considering this.  No surgical intervention indicated at this time.

## 2020-09-21 NOTE — PROGRESS NOTE ADULT - SUBJECTIVE AND OBJECTIVE BOX
Patient is a 80y old  Male who presents with a chief complaint of     OVERNIGHT EVENTS: none     MEDICATIONS  (STANDING):  bisacodyl 10 milliGRAM(s) Oral at bedtime  carvedilol 25 milliGRAM(s) Oral every 12 hours  cefTRIAXone   IVPB 1000 milliGRAM(s) IV Intermittent every 24 hours  cefTRIAXone   IVPB      cyanocobalamin Injectable 1000 MICROGram(s) SubCutaneous daily  folic acid 1 milliGRAM(s) Oral daily  metroNIDAZOLE  IVPB      metroNIDAZOLE  IVPB 500 milliGRAM(s) IV Intermittent every 8 hours  pantoprazole Infusion 8 mG/Hr (10 mL/Hr) IV Continuous <Continuous>  potassium phosphate IVPB 15 milliMole(s) IV Intermittent once  sucralfate suspension 1 Gram(s) Oral every 6 hours    MEDICATIONS  (PRN):  melatonin 3 milliGRAM(s) Oral at bedtime PRN Insomnia    MEDICATIONS  (PRN):    Allergies    No Known Allergies    Intolerances        SUBJECTIVE: in bed in NAD, no acute events overnight  noted    Vital Signs Last 24 Hrs  T(C): 37 (21 Sep 2020 05:37), Max: 37.8 (20 Sep 2020 23:26)  T(F): 98.6 (21 Sep 2020 05:37), Max: 100 (20 Sep 2020 23:26)  HR: 82 (21 Sep 2020 05:37) (75 - 83)  BP: 145/71 (21 Sep 2020 05:37) (145/71 - 157/59)  BP(mean): --  RR: 18 (21 Sep 2020 05:37) (18 - 20)  SpO2: 100% (21 Sep 2020 05:37) (100% - 100%)    PHYSICAL EXAM:  GENERAL: NAD, well-groomed, well-developed  HEAD:  Atraumatic, Normocephalic  EYES: EOMI, PERRLA, conjunctiva and sclera clear  ENMT: No tonsillar erythema, exudates, or enlargement; Moist mucous membranes, Good dentition, No lesions  NECK: Supple, No JVD, Normal thyroid  CHEST/LUNG: Clear to  auscultation bilaterally; No rales, rhonchi, wheezing, or rubs  bilaterally  HEART: Regular rate and rhythm; No murmurs, rubs, or gallops  ABDOMEN: Soft, Nontender, Nondistended; Bowel sounds present  EXTREMITIES:  2+ Peripheral Pulses, No clubbing, cyanosis, or edema BL LE  SKIN: No rashes or lesions  NERVOUS SYSTEM:  Alert & confused  s./p left tma   DTRs 2+ intact and symmetric, sensation intact BL    LABS:                                                7.2    9.70  )-----------( 38       ( 21 Sep 2020 06:57 )             21.8   09-    140  |  113<H>  |  23  ----------------------------<  110<H>  4.0   |  21<L>  |  0.67    Ca    7.5<L>      21 Sep 2020 06:57  Phos  2.1       Mg     2.6         TPro  5.3<L>  /  Alb  x   /  TBili  x   /  DBili  x   /  AST  x   /  ALT  x   /  AlkPhos  x        PTT - ( 16 Sep 2020 09:50 )  PTT:31.4 sec  Urinalysis Basic - ( 17 Sep 2020 01:11 )    Color: Yellow / Appearance: Clear / S.015 / pH: x  Gluc: x / Ketone: Trace  / Bili: Negative / Urobili: Negative mg/dL   Blood: x / Protein: Negative mg/dL / Nitrite: Negative   Leuk Esterase: Negative / RBC: x / WBC x   Sq Epi: x / Non Sq Epi: x / Bacteria: x      Cultures;   CAPILLARY BLOOD GLUCOSE        Lipid panel:     CARDIAC MARKERS ( 16 Sep 2020 09:50 )  .046 ng/mL / x     / x     / x     / x            RADIOLOGY & ADDITIONAL TESTS:      Imaging Personally Reviewed:  [ x] YES      Consultant(s) Notes Reviewed:  [x ] YES     Care Discussed with [x ] Consultants [X ] Patient [x ] Family  [x ]    [x ]  Other; RN

## 2020-09-21 NOTE — PROGRESS NOTE ADULT - SUBJECTIVE AND OBJECTIVE BOX
lying in bed	    Vital Signs Last 24 Hrs  T(C): 37 (21 Sep 2020 05:37), Max: 37.8 (20 Sep 2020 23:26)  T(F): 98.6 (21 Sep 2020 05:37), Max: 100 (20 Sep 2020 23:26)  HR: 82 (21 Sep 2020 05:37) (75 - 83)  BP: 145/71 (21 Sep 2020 05:37) (145/71 - 157/59)  BP(mean): --  RR: 18 (21 Sep 2020 05:37) (18 - 20)  SpO2: 100% (21 Sep 2020 05:37) (100% - 100%)    PHYSICAL EXAM:    general - feeling weak +  HEENT - No Icterus  CVS - RRR  RS - AE B/L  Abd - soft, NT  Ext - Pulses +        LABS:                        7.2    9.70  )-----------( 38       ( 21 Sep 2020 06:57 )             21.8     09-21    140  |  113<H>  |  23  ----------------------------<  110<H>  4.0   |  21<L>  |  0.67    Ca    7.5<L>      21 Sep 2020 06:57  Phos  2.1     09-21  Mg     2.6     09-21    TPro  5.3<L>  /  Alb  x   /  TBili  x   /  DBili  x   /  AST  x   /  ALT  x   /  AlkPhos  x   09-19          Culture - Urine (collected 20 Sep 2020 12:08)  Source: .Urine Clean Catch (Midstream)  Final Report (21 Sep 2020 07:27):    No growth    Culture - Urine (collected 17 Sep 2020 09:03)  Source: .Urine Clean Catch (Midstream)  Final Report (19 Sep 2020 08:46):    >=3 organisms. Probable collection contamination.    Culture - Blood (collected 16 Sep 2020 14:04)  Source: .Blood Blood-Peripheral  Preliminary Report (17 Sep 2020 15:01):    No growth to date.    Culture - Blood (collected 16 Sep 2020 14:04)  Source: .Blood Blood-Peripheral  Preliminary Report (17 Sep 2020 15:01):    No growth to date.        RADIOLOGY & ADDITIONAL STUDIES:

## 2020-09-22 NOTE — PROGRESS NOTE ADULT - ASSESSMENT
80M with PMHx colon cancer (2010), fibrosarcoma of the foot, ulcerative colitis, chronic anemia, HTN admitted with Acute Blood Loss Anemia 2/2 GIB, Colitis, and ANNEMARIE (now resolved).  S/p EGD 9/17, found to have Hemorrhagic Gastritis; Gastric Ulcers   S/p 2u PRBC 9/18, with +melena. and 2u PRBC 9/21  Hypokalemia Hypophosphatemia.  S/p bone marrow biopsy on 9/18.    -Monitor for s/s of bleeding, monitor H/H, transfuse PRN.   -Continue Protonix gtt and carafate  -Follow up bone marrow biopsy and Heme Onc.  -Advance diet as tolerated as per GI  -c/w abx for colitis  -Dr. Campbell suggests colonoscopy - Dr. Walton considering during this admission.  -No surgical intervention indicated at this time.    MARTIN Rucker PA-C  Surgery 80M with PMHx colon cancer (2010), fibrosarcoma of the foot, ulcerative colitis, chronic anemia, HTN admitted with Acute Blood Loss Anemia 2/2 GIB, Colitis, and ANNEMARIE (now resolved).  S/p EGD 9/17, found to have Hemorrhagic Gastritis; Gastric Ulcers   S/p 2u PRBC 9/18, with +melena. and 2u PRBC 9/21  Hypophosphatemia.  S/p bone marrow biopsy on 9/18.    -Monitor for s/s of bleeding, monitor H/H, transfuse PRN.   -Continue Protonix gtt and carafate  -Follow up bone marrow biopsy and Heme Onc.  -Advance diet as tolerated as per GI  -c/w abx for colitis  -Dr. Campbell suggests colonoscopy - Dr. Walton considering during this admission.  -No surgical intervention indicated at this time.    MARTIN Rucker PA-C  Surgery

## 2020-09-22 NOTE — PROGRESS NOTE ADULT - PROBLEM SELECTOR PLAN 1
- continue b12 and folic acid supplements  - f/u bm biopsy report  - supportive care  - d/w daughter - continue b12 and folic acid supplements  - f/u bm biopsy report  - supportive care  - d/w daughter - yesterday pancytopenia since at least 2018

## 2020-09-22 NOTE — PROGRESS NOTE ADULT - SUBJECTIVE AND OBJECTIVE BOX
Pt comfortable  no active bleeding; normal BM  +pancytopenia      MEDICATIONS  (STANDING):  bisacodyl 10 milliGRAM(s) Oral at bedtime  carvedilol 25 milliGRAM(s) Oral every 12 hours  cefTRIAXone   IVPB      cefTRIAXone   IVPB 1000 milliGRAM(s) IV Intermittent every 24 hours  cyanocobalamin Injectable 1000 MICROGram(s) SubCutaneous daily  ergocalciferol 76767 Unit(s) Oral <User Schedule>  folic acid 1 milliGRAM(s) Oral daily  metroNIDAZOLE  IVPB      metroNIDAZOLE  IVPB 500 milliGRAM(s) IV Intermittent every 8 hours  pantoprazole Infusion 8 mG/Hr (10 mL/Hr) IV Continuous <Continuous>  sucralfate suspension 1 Gram(s) Oral every 6 hours    MEDICATIONS  (PRN):  melatonin 3 milliGRAM(s) Oral at bedtime PRN Insomnia      Allergies    No Known Allergies    Intolerances        Vital Signs Last 24 Hrs  T(C): 37.5 (22 Sep 2020 06:06), Max: 37.9 (21 Sep 2020 19:16)  T(F): 99.5 (22 Sep 2020 06:06), Max: 100.2 (21 Sep 2020 19:16)  HR: 71 (22 Sep 2020 11:03) (71 - 90)  BP: 155/62 (22 Sep 2020 11:03) (124/61 - 157/73)  BP(mean): --  RR: 17 (22 Sep 2020 11:03) (17 - 19)  SpO2: 100% (22 Sep 2020 11:03) (99% - 100%)    PHYSICAL EXAM:  General: NAD.  CVS: S1, S2  Chest: air entry bilaterally present  Abd: BS present, soft, non-tender      LABS:                        9.5    11.15 )-----------( 42       ( 22 Sep 2020 07:12 )             29.5     09-22    143  |  114<H>  |  16  ----------------------------<  106<H>  4.2   |  22  |  0.62    Ca    7.6<L>      22 Sep 2020 07:12  Phos  2.4     09-22  Mg     2.4     09-22        on protonix and carafate  Pt with hemorrhagic esophagitis and gastric ulcers - will continue same meds  Pt would like colonoscopy due to h/o colon cancer years ago - will need to hold off and arrange when stable as outpatient due to thrombocytopenia

## 2020-09-22 NOTE — PROGRESS NOTE ADULT - SUBJECTIVE AND OBJECTIVE BOX
Surgery AM Progress Note    S: Pt. seen and examined at bedside, resting comfortably. No overnight issues.  No complaints, denies abdominal pain, nausea or vomiting. Tolerating full liquid diet. +flatus, + BM. No new rectal bleeding.  Denies fever/chills, chest pain, dyspnea, cough, dizziness.   S/P Bone Marrow Bx. secondary to Pancytopenia.   Received 2u PRBC yesterday (9/21).     Vital Signs Last 24 Hrs  T(C): 37.5 (22 Sep 2020 06:06), Max: 37.9 (21 Sep 2020 19:16)  T(F): 99.5 (22 Sep 2020 06:06), Max: 100.2 (21 Sep 2020 19:16)  HR: 74 (22 Sep 2020 06:06) (71 - 90)  BP: 151/69 (22 Sep 2020 06:06) (124/61 - 157/73)  RR: 18 (22 Sep 2020 06:06) (17 - 19)  SpO2: 100% (22 Sep 2020 06:06) (99% - 100%)    I&O's Summary    21 Sep 2020 07:01  -  22 Sep 2020 07:00  --------------------------------------------------------  IN: 520 mL / OUT: 0 mL / NET: 520 mL       Physical Exam  General: NAD, Awake and Alert x 3  Chest: respirations nonlabored  Abdomen: soft, NTND. + Bowel Sounds  Extremities: no pedal edema. L foot amputation    LABS:                        9.5    11.15 )-----------( 42       ( 22 Sep 2020 07:12 )             29.5       143  |  114<H>  |  16  ----------------------------<  106<H>  4.2   |  22  |  0.62    Ca    7.6<L>      22 Sep 2020 07:12  Phos  2.4     09-22  Mg     2.4     09-22

## 2020-09-22 NOTE — PROGRESS NOTE ADULT - REASON FOR ADMISSION
GI Bleeding, Abdominal pain
hypotension, anemia, melena
GI Bleeding, Abdominal pain
GI bleeding, Anemia, Abdominal pain
GI. Bleeding, Abdominal pain

## 2020-09-22 NOTE — PROGRESS NOTE ADULT - PROBLEM SELECTOR PLAN 2
acute blood loss anemia from gib-s/p 3 units of blood    for egd and colonoscopy on today     9/18/2020 egd c/w gastric ulcer  , hgb this am low will transfuse 2 more units prbc   continue with Protonix    9/19/2020 hgb adequate response, continue to montior and transfuse appropriately    tumor markers noted and wnl colitis on CT scan continue with antibx jake add Flagyl    9/22/2020 tranfused 2 more units on 9/21/2020

## 2020-09-22 NOTE — PROGRESS NOTE ADULT - PROBLEM SELECTOR PLAN 5
unclear will get heme consult  9/19/2020 per daughter  longstanding never had biopsy on 9/18/2020 s/p BM biopsy follow up results  9/22/2020 still awaiting bm biopsy results

## 2020-09-22 NOTE — PROGRESS NOTE ADULT - SUBJECTIVE AND OBJECTIVE BOX
lying in bed    Vital Signs Last 24 Hrs  T(C): 37.5 (22 Sep 2020 06:06), Max: 37.9 (21 Sep 2020 19:16)  T(F): 99.5 (22 Sep 2020 06:06), Max: 100.2 (21 Sep 2020 19:16)  HR: 74 (22 Sep 2020 06:06) (71 - 90)  BP: 151/69 (22 Sep 2020 06:06) (124/61 - 157/73)  BP(mean): --  RR: 18 (22 Sep 2020 06:06) (17 - 19)  SpO2: 100% (22 Sep 2020 06:06) (99% - 100%)    PHYSICAL EXAM:    general - AAO x 3  HEENT - No Icterus  CVS - RRR  RS - AE B/L  Abd - soft, NT  Ext - Pulses +        LABS:                        9.5    11.15 )-----------( 42       ( 22 Sep 2020 07:12 )             29.5     09-22    143  |  114<H>  |  16  ----------------------------<  106<H>  4.2   |  22  |  0.62    Ca    7.6<L>      22 Sep 2020 07:12  Phos  2.4     09-22  Mg     2.4     09-22            Culture - Urine (collected 20 Sep 2020 12:08)  Source: .Urine Clean Catch (Midstream)  Final Report (21 Sep 2020 07:27):    No growth    Culture - Urine (collected 17 Sep 2020 09:03)  Source: .Urine Clean Catch (Midstream)  Final Report (19 Sep 2020 08:46):    >=3 organisms. Probable collection contamination.    Culture - Blood (collected 16 Sep 2020 14:04)  Source: .Blood Blood-Peripheral  Final Report (21 Sep 2020 15:00):    No Growth Final    Culture - Blood (collected 16 Sep 2020 14:04)  Source: .Blood Blood-Peripheral  Final Report (21 Sep 2020 15:00):    No Growth Final        RADIOLOGY & ADDITIONAL STUDIES:

## 2020-09-22 NOTE — PROGRESS NOTE ADULT - ASSESSMENT
80 years old male with with history of colon cancer with weakness from low hgb - physical therapy also hypotensive so will hold anti hypertensives

## 2020-09-23 NOTE — DIETITIAN INITIAL EVALUATION ADULT. - OBTAIN WEEKLY WEIGHT
Spoke with patient and reviewed results      Note      ----- Message from Naren Elizabeth MD sent at 8/14/2018 11:06 PM CDT -----  Please notify patient with result. GERD, neg Jaime's, neg HP. PPI.            yes

## 2020-09-23 NOTE — PROGRESS NOTE ADULT - PROBLEM SELECTOR PLAN 2
acute blood loss anemia from gib-s/p 3 units of blood    for egd and colonoscopy on today     9/18/2020 egd c/w gastric ulcer  , hgb this am low will transfuse 2 more units prbc   continue with Protonix    9/19/2020 hgb adequate response, continue to montior and transfuse appropriately    tumor markers noted and wnl colitis on CT scan continue with antibx jake add Flagyl    9/22/2020 transfused 2 more units on 9/21/2020 9/23/2020 hgb stable

## 2020-09-23 NOTE — PROGRESS NOTE ADULT - SUBJECTIVE AND OBJECTIVE BOX
lying in bed, platelts stable    Vital Signs Last 24 Hrs  T(C): 36.6 (23 Sep 2020 05:19), Max: 37.2 (22 Sep 2020 17:15)  T(F): 97.9 (23 Sep 2020 05:19), Max: 99 (22 Sep 2020 17:15)  HR: 70 (23 Sep 2020 05:19) (70 - 82)  BP: 157/64 (23 Sep 2020 05:19) (155/62 - 166/70)  BP(mean): --  RR: 18 (23 Sep 2020 05:19) (17 - 20)  SpO2: 99% (23 Sep 2020 05:19) (98% - 100%)    PHYSICAL EXAM:    general - AAO x 3  HEENT - No Icterus  CVS - RRR  RS - AE B/L  Abd - soft, NT  Ext - Pulses +        LABS:                        9.3    12.17 )-----------( 45       ( 23 Sep 2020 06:56 )             28.6     09-23    139  |  110<H>  |  12  ----------------------------<  109<H>  4.0   |  22  |  0.58    Ca    7.3<L>      23 Sep 2020 06:56  Phos  2.3     09-23  Mg     2.0     09-23            Culture - Urine (collected 20 Sep 2020 12:08)  Source: .Urine Clean Catch (Midstream)  Final Report (21 Sep 2020 07:27):    No growth    Culture - Urine (collected 17 Sep 2020 09:03)  Source: .Urine Clean Catch (Midstream)  Final Report (19 Sep 2020 08:46):    >=3 organisms. Probable collection contamination.    Culture - Blood (collected 16 Sep 2020 14:04)  Source: .Blood Blood-Peripheral  Final Report (21 Sep 2020 15:00):    No Growth Final    Culture - Blood (collected 16 Sep 2020 14:04)  Source: .Blood Blood-Peripheral  Final Report (21 Sep 2020 15:00):    No Growth Final        RADIOLOGY & ADDITIONAL STUDIES:

## 2020-09-23 NOTE — PROGRESS NOTE ADULT - SUBJECTIVE AND OBJECTIVE BOX
Pt stable - no further bleeding  on antibiotics and protonix/carafate    MEDICATIONS  (STANDING):  amLODIPine   Tablet 5 milliGRAM(s) Oral daily  bisacodyl 10 milliGRAM(s) Oral at bedtime  carvedilol 25 milliGRAM(s) Oral every 12 hours  cefTRIAXone   IVPB 1000 milliGRAM(s) IV Intermittent every 24 hours  cefTRIAXone   IVPB      cyanocobalamin Injectable 1000 MICROGram(s) SubCutaneous daily  ergocalciferol 98944 Unit(s) Oral <User Schedule>  folic acid 1 milliGRAM(s) Oral daily  metroNIDAZOLE  IVPB      metroNIDAZOLE  IVPB 500 milliGRAM(s) IV Intermittent every 8 hours  pantoprazole    Tablet 40 milliGRAM(s) Oral every 12 hours  sucralfate suspension 1 Gram(s) Oral every 6 hours    MEDICATIONS  (PRN):  melatonin 3 milliGRAM(s) Oral at bedtime PRN Insomnia      Allergies    No Known Allergies    Intolerances        Vital Signs Last 24 Hrs  T(C): 37.1 (23 Sep 2020 12:48), Max: 37.2 (22 Sep 2020 17:15)  T(F): 98.8 (23 Sep 2020 12:48), Max: 99 (22 Sep 2020 17:15)  HR: 79 (23 Sep 2020 12:48) (70 - 82)  BP: 144/68 (23 Sep 2020 12:48) (144/68 - 160/72)  BP(mean): --  RR: 17 (23 Sep 2020 12:48) (17 - 20)  SpO2: 98% (23 Sep 2020 12:48) (98% - 100%)    PHYSICAL EXAM:  General: NAD.  CVS: S1, S2  Chest: air entry bilaterally present  Abd: BS present, soft, non-tender      LABS:                        9.3    10.89 )-----------( 50       ( 23 Sep 2020 12:47 )             27.6     09-23    139  |  110<H>  |  12  ----------------------------<  109<H>  4.0   |  22  |  0.58    Ca    7.3<L>      23 Sep 2020 06:56  Phos  2.3     09-23  Mg     2.0     09-23      Continue present meds  pt with h/o Colon Ca - will discuss colonoscopy with patient

## 2020-09-23 NOTE — DIETITIAN INITIAL EVALUATION ADULT. - PERTINENT MEDS FT
MEDICATIONS  (STANDING):  bisacodyl 10 milliGRAM(s) Oral at bedtime  carvedilol 25 milliGRAM(s) Oral every 12 hours  cefTRIAXone   IVPB 1000 milliGRAM(s) IV Intermittent every 24 hours  cefTRIAXone   IVPB      cyanocobalamin Injectable 1000 MICROGram(s) SubCutaneous daily  ergocalciferol 56806 Unit(s) Oral <User Schedule>  folic acid 1 milliGRAM(s) Oral daily  metroNIDAZOLE  IVPB      metroNIDAZOLE  IVPB 500 milliGRAM(s) IV Intermittent every 8 hours  pantoprazole Infusion 8 mG/Hr (10 mL/Hr) IV Continuous <Continuous>  sucralfate suspension 1 Gram(s) Oral every 6 hours    MEDICATIONS  (PRN):  melatonin 3 milliGRAM(s) Oral at bedtime PRN Insomnia

## 2020-09-23 NOTE — DIETITIAN INITIAL EVALUATION ADULT. - DIET TYPE
When medically feasible recommend advance diet to Low sodium + Ensure Clear x 3/day (provides 720 kcal, 24 g protein)

## 2020-09-23 NOTE — DIETITIAN INITIAL EVALUATION ADULT. - OTHER INFO
Pt with PMH HTN, colon CA, presented with weakness and diarrhea, found with GI bleed, anemia, HTN, and pancytopenia, s/p bone marrow biopsy 9/18.  Pt confused. Asleep at time of visit. Unable to obtain full history.  Per care coordination note pt lives with niece and is fully dependent for ADLs. Per flow sheets pt consuming 0-100% of documented meals of full liquid diet. Per surgery PA note (09/21) pt tolerating full liquid diet. Per surgery PA note today (09/23) pt had BM overnight with no bleeding.  Dosing weight this admission: 77.1kg (09/16/2020)  RD to remain available.

## 2020-09-23 NOTE — PROGRESS NOTE ADULT - SUBJECTIVE AND OBJECTIVE BOX
Patient is a 80y old  Male who presents with a chief complaint of     OVERNIGHT EVENTS: none     MEDICATIONS  (STANDING):  amLODIPine   Tablet 5 milliGRAM(s) Oral daily  bisacodyl 10 milliGRAM(s) Oral at bedtime  carvedilol 25 milliGRAM(s) Oral every 12 hours  cefTRIAXone   IVPB 1000 milliGRAM(s) IV Intermittent every 24 hours  cefTRIAXone   IVPB      cyanocobalamin Injectable 1000 MICROGram(s) SubCutaneous daily  ergocalciferol 85467 Unit(s) Oral <User Schedule>  folic acid 1 milliGRAM(s) Oral daily  metroNIDAZOLE  IVPB      metroNIDAZOLE  IVPB 500 milliGRAM(s) IV Intermittent every 8 hours  pantoprazole Infusion 8 mG/Hr (10 mL/Hr) IV Continuous <Continuous>  sucralfate suspension 1 Gram(s) Oral every 6 hours    MEDICATIONS  (PRN):  melatonin 3 milliGRAM(s) Oral at bedtime PRN Insomnia    Allergies    No Known Allergies    Intolerances        SUBJECTIVE: in bed in NAD, no acute events overnight  noted    Vital Signs Last 24 Hrs  T(C): 36.6 (23 Sep 2020 05:19), Max: 37.2 (22 Sep 2020 17:15)  T(F): 97.9 (23 Sep 2020 05:19), Max: 99 (22 Sep 2020 17:15)  HR: 70 (23 Sep 2020 05:19) (70 - 82)  BP: 157/64 (23 Sep 2020 05:19) (155/62 - 166/70)  BP(mean): --  RR: 18 (23 Sep 2020 05:19) (17 - 20)  SpO2: 99% (23 Sep 2020 05:19) (98% - 100%)    PHYSICAL EXAM:  GENERAL: NAD, well-groomed, well-developed  HEAD:  Atraumatic, Normocephalic  EYES: EOMI, PERRLA, conjunctiva and sclera clear  ENMT: No tonsillar erythema, exudates, or enlargement; Moist mucous membranes, Good dentition, No lesions  NECK: Supple, No JVD, Normal thyroid  CHEST/LUNG: Clear to  auscultation bilaterally; No rales, rhonchi, wheezing, or rubs  bilaterally  HEART: Regular rate and rhythm; No murmurs, rubs, or gallops  ABDOMEN: Soft, Nontender, Nondistended; Bowel sounds present  EXTREMITIES:  2+ Peripheral Pulses, No clubbing, cyanosis, or edema BL LE  SKIN: No rashes or lesions  NERVOUS SYSTEM:  Alert & confused  s./p left tma   DTRs 2+ intact and symmetric, sensation intact BL    LABS:                                                           9.3    12.17 )-----------( 45       ( 23 Sep 2020 06:56 )             28.6   09-23    139  |  110<H>  |  12  ----------------------------<  109<H>  4.0   |  22  |  0.58    Ca    7.3<L>      23 Sep 2020 06:56  Phos  2.3     09-23  Mg     2.0     09-23        Cultures;   CAPILLARY BLOOD GLUCOSE        Lipid panel:     CARDIAC MARKERS ( 16 Sep 2020 09:50 )  .046 ng/mL / x     / x     / x     / x            RADIOLOGY & ADDITIONAL TESTS:      Imaging Personally Reviewed:  [ x] YES      Consultant(s) Notes Reviewed:  [x ] YES     Care Discussed with [x ] Consultants [X ] Patient [x ] Family  [x ]    [x ]  Other; RN

## 2020-09-23 NOTE — DIETITIAN INITIAL EVALUATION ADULT. - PERTINENT LABORATORY DATA
09-23 Na139 mmol/L Glu 109 mg/dL<H> K+ 4.0 mmol/L Cr  0.58 mg/dL BUN 12 mg/dL 09-23 Phos 2.3 mg/dL<L> 09-19 Alb 2.9 g/dL<L>

## 2020-09-23 NOTE — PROGRESS NOTE ADULT - SUBJECTIVE AND OBJECTIVE BOX
Patient seen and examined at bedside in no distress.  Tolerating liquid diet.   +BM overnight, denies rectal bleeding.  Denies fever, chills, chest pain, sob, n/v, abdominal pain.    Vital Signs Last 24 Hrs  T(F): 97.9 (09-23-20 @ 05:19), Max: 99 (09-22-20 @ 17:15)  HR: 70 (09-23-20 @ 05:19)  BP: 157/64 (09-23-20 @ 05:19)  RR: 18 (09-23-20 @ 05:19)  SpO2: 99% (09-23-20 @ 05:19)    GENERAL: Alert, oriented, NAD  CHEST/LUNG: Clear to auscultation bilaterally, respirations nonlabored  HEART: S1S2, RRR  ABDOMEN: + Bowel sounds, soft, NTND    LABS:                        9.3    12.17 )-----------( 45       ( 23 Sep 2020 06:56 )             28.6     09-23    139  |  110<H>  |  12  ----------------------------<  109<H>  4.0   |  22  |  0.58    Ca    7.3<L>      23 Sep 2020 06:56  Phos  2.3     09-23  Mg     2.0     09-23    A/P: 80M with PMHx colon cancer (2010), fibrosarcoma of the foot, ulcerative colitis, chronic anemia, HTN admitted with Acute Blood Loss Anemia requiring 4uPRBC 2/2 GIB, Colitis, and ANNEMARIE (now resolved). S/p EGD 9/17 with hemorrhagic gastritis and gastric ulcers. S/p bone marrow bx 9/18 for pancytopenia  H/H stable. Mild increase WBC.   - protonix/carafate  - awaiting results of BM bx, heme/onc f/u  - diet per GI  - recommend colonoscopy  - no acute surgical intervention; monitor for s/s of bleeding, transfuse PRN  - continue medical management and supportive care  - will d/w Dr. Campbell

## 2020-09-23 NOTE — PROGRESS NOTE ADULT - PROBLEM SELECTOR PLAN 5
unclear will get heme consult  9/19/2020 per daughter  longstanding never had biopsy on 9/18/2020 s/p BM biopsy follow up results  9/22/2020 still awaiting bm biopsy results  9/23/2020 called pathologist dr. Twyla Latham at [facility] and sent off for more staining states the marrow looks hypercellular similar to peripheral blood smear with monocytes but she has to wait to definitively for results could be MDS or CMML

## 2020-09-23 NOTE — DIETITIAN INITIAL EVALUATION ADULT. - PHYSICAL APPEARANCE
BMI=24.4; 1+ left and right legs edema/other (specify) nutrition focused physical exam conducted visually: mild signs of fat loss in buccal area, and mild signs of muscle loss in the temple area

## 2020-09-23 NOTE — PROGRESS NOTE ADULT - PROBLEM SELECTOR PLAN 1
- continue B12 and folic acid supplementation  - check retic count  - platelet count stable, increasing slightly  - await BM biopsy report

## 2020-09-24 NOTE — PROGRESS NOTE ADULT - PROBLEM SELECTOR PLAN 10
on admission with some ischemic changes in lateral lead ing of hgb o 6.4 and mildly elevated troponin presumed to be due to stress induced ischemia and troponin leak secondary to low lhgb .    will repeat ekg and get one more set or tropinin.    no ASA in ligt of GI Bleed:-   plans for colonoscopy in am , s/p EGD earlier in admission

## 2020-09-24 NOTE — PROGRESS NOTE ADULT - PROBLEM SELECTOR PLAN 1
- platelets slowly rising - continue b12 supplementation  - f/u bm BIopsy result  - physical therapy  - supportive care

## 2020-09-24 NOTE — PROGRESS NOTE ADULT - SUBJECTIVE AND OBJECTIVE BOX
Pt seen and examined at bedside, no acute issues overnight,    Vital Signs Last 24 Hrs  T(C): 36.7 (24 Sep 2020 05:56), Max: 37.3 (23 Sep 2020 17:15)  T(F): 98.1 (24 Sep 2020 05:56), Max: 99.1 (23 Sep 2020 17:15)  HR: 68 (24 Sep 2020 05:56) (68 - 73)  BP: 145/67 (24 Sep 2020 05:56) (145/67 - 154/74)  BP(mean): --  RR: 18 (24 Sep 2020 05:56) (18 - 19)  SpO2: 100% (24 Sep 2020 05:56) (97% - 100%)  I&O's Summary    23 Sep 2020 07:01  -  24 Sep 2020 07:00  --------------------------------------------------------  IN: 573 mL / OUT: 2 mL / NET: 571 mL        PHYSICAL EXAM:  GENERAL: Alert, oriented, NAD  CHEST/LUNG: Clear to auscultation bilaterally, respirations nonlabored  HEART: S1S2, RRR  ABDOMEN: + Bowel sounds, soft, NTND      LABS:                        9.3    10.62 )-----------( 52       ( 24 Sep 2020 08:20 )             29.2     09-23    139  |  110<H>  |  12  ----------------------------<  109<H>  4.0   |  22  |  0.58    Ca    7.3<L>      23 Sep 2020 06:56  Phos  2.3     09-23  Mg     2.0     09-23      A/P: 80M with PMHx colon cancer (2010), fibrosarcoma of the foot, ulcerative colitis, chronic anemia, HTN admitted with Acute Blood Loss Anemia requiring 4uPRBC 2/2 GIB, Colitis, and ANNEMARIE (now resolved). S/p EGD 9/17 with hemorrhagic gastritis and gastric ulcers. S/p bone marrow bx 9/18 for pancytopenia  H/H stable. Mild increase WBC.   - protonix/carafate  - awaiting results of BM bx  - diet per GI  - colonoscopy planning tomorrow  - no acute surgical intervention  - continue medical management and supportive care  - will d/w Dr. Campbell

## 2020-09-24 NOTE — PROGRESS NOTE ADULT - SUBJECTIVE AND OBJECTIVE BOX
Patient is a 80y old  Male who presents with a chief complaint of     OVERNIGHT EVENTS: none     MEDICATIONS  (STANDING):  amLODIPine   Tablet 5 milliGRAM(s) Oral daily  bisacodyl 10 milliGRAM(s) Oral once  bisacodyl 10 milliGRAM(s) Oral at bedtime  carvedilol 25 milliGRAM(s) Oral every 12 hours  cefTRIAXone   IVPB 1000 milliGRAM(s) IV Intermittent every 24 hours  cefTRIAXone   IVPB      cyanocobalamin Injectable 1000 MICROGram(s) SubCutaneous daily  ergocalciferol 85209 Unit(s) Oral <User Schedule>  folic acid 1 milliGRAM(s) Oral daily  metroNIDAZOLE  IVPB      metroNIDAZOLE  IVPB 500 milliGRAM(s) IV Intermittent every 8 hours  pantoprazole    Tablet 40 milliGRAM(s) Oral every 12 hours  polyethylene glycol/electrolyte Solution. 4000 milliLiter(s) Oral once  sucralfate suspension 1 Gram(s) Oral every 6 hours    MEDICATIONS  (PRN):  acetaminophen   Tablet .. 650 milliGRAM(s) Oral every 6 hours PRN Mild Pain (1 - 3)  melatonin 3 milliGRAM(s) Oral at bedtime PRN Insomnia    Allergies    No Known Allergies    Intolerances        SUBJECTIVE: in bed in NAD, no acute events overnight  noted      Vital Signs Last 24 Hrs  T(C): 36.7 (24 Sep 2020 05:56), Max: 37.3 (23 Sep 2020 17:15)  T(F): 98.1 (24 Sep 2020 05:56), Max: 99.1 (23 Sep 2020 17:15)  HR: 68 (24 Sep 2020 05:56) (68 - 79)  BP: 145/67 (24 Sep 2020 05:56) (144/68 - 154/74)  BP(mean): --  RR: 18 (24 Sep 2020 05:56) (17 - 19)  SpO2: 100% (24 Sep 2020 05:56) (97% - 100%)    PHYSICAL EXAM:  GENERAL: NAD, well-groomed, well-developed  HEAD:  Atraumatic, Normocephalic  EYES: EOMI, PERRLA, conjunctiva and sclera clear  ENMT: No tonsillar erythema, exudates, or enlargement; Moist mucous membranes, Good dentition, No lesions  NECK: Supple, No JVD, Normal thyroid  CHEST/LUNG: Clear to  auscultation bilaterally; No rales, rhonchi, wheezing, or rubs  bilaterally  HEART: Regular rate and rhythm; No murmurs, rubs, or gallops  ABDOMEN: Soft, Nontender, Nondistended; Bowel sounds present  EXTREMITIES:  2+ Peripheral Pulses, No clubbing, cyanosis, or edema BL LE  SKIN: No rashes or lesions  NERVOUS SYSTEM:  Alert & confused  s./p left tma   DTRs 2+ intact and symmetric, sensation intact BL    LABS:                                                                    9.3    10.62 )-----------( 52       ( 24 Sep 2020 08:20 )             29.2   09-23    139  |  110<H>  |  12  ----------------------------<  109<H>  4.0   |  22  |  0.58    Ca    7.3<L>      23 Sep 2020 06:56  Phos  2.3     09-23  Mg     2.0     09-23          Cultures;   CAPILLARY BLOOD GLUCOSE        Lipid panel:     CARDIAC MARKERS ( 16 Sep 2020 09:50 )  .046 ng/mL / x     / x     / x     / x      < from: 12 Lead ECG (09.16.20 @ 09:24) >    Diagnosis Line Sinus rhythm with 1st degree AV block with occasional premature ventricular complexes  ST & T wave abnormality, consider lateral ischemia  Prolonged QT  Abnormal ECG  No previous ECGs available    < end of copied text >        RADIOLOGY & ADDITIONAL TESTS:      Imaging Personally Reviewed:  [ x] YES      Consultant(s) Notes Reviewed:  [x ] YES     Care Discussed with [x ] Consultants [X ] Patient [x ] Family  [x ]    [x ]  Other; RN

## 2020-09-24 NOTE — PROGRESS NOTE ADULT - PROBLEM SELECTOR PLAN 2
acute blood loss anemia from gib-s/p 3 units of blood    for egd and colonoscopy on today     9/18/2020 egd c/w gastric ulcer  , hgb this am low will transfuse 2 more units prbc   continue with Protonix    9/19/2020 hgb adequate response, continue to montior and transfuse appropriately    tumor markers noted and wnl colitis on CT scan continue with antibx jake add Flagyl    9/22/2020 transfused 2 more units on 9/21/2020 9/23/2020 hgb stable  9/24/2020 will finally go for colonoscopy on 9/25/2020 . f/u cxr and ekg

## 2020-09-24 NOTE — PROGRESS NOTE ADULT - SUBJECTIVE AND OBJECTIVE BOX
lying in bed    Vital Signs Last 24 Hrs  T(C): 36.7 (24 Sep 2020 05:56), Max: 37.3 (23 Sep 2020 17:15)  T(F): 98.1 (24 Sep 2020 05:56), Max: 99.1 (23 Sep 2020 17:15)  HR: 68 (24 Sep 2020 05:56) (68 - 79)  BP: 145/67 (24 Sep 2020 05:56) (144/68 - 154/74)  BP(mean): --  RR: 18 (24 Sep 2020 05:56) (17 - 19)  SpO2: 100% (24 Sep 2020 05:56) (97% - 100%)    PHYSICAL EXAM:    general - weak Looking +  HEENT - No Icterus  CVS - RRR  RS - AE B/L  Abd - soft, NT  Ext - Pulses +        LABS:                        9.3    10.62 )-----------( 52       ( 24 Sep 2020 08:20 )             29.2     09-23    139  |  110<H>  |  12  ----------------------------<  109<H>  4.0   |  22  |  0.58    Ca    7.3<L>      23 Sep 2020 06:56  Phos  2.3     09-23  Mg     2.0     09-23            Culture - Urine (collected 20 Sep 2020 12:08)  Source: .Urine Clean Catch (Midstream)  Final Report (21 Sep 2020 07:27):    No growth    Culture - Urine (collected 17 Sep 2020 09:03)  Source: .Urine Clean Catch (Midstream)  Final Report (19 Sep 2020 08:46):    >=3 organisms. Probable collection contamination.    Culture - Blood (collected 16 Sep 2020 14:04)  Source: .Blood Blood-Peripheral  Final Report (21 Sep 2020 15:00):    No Growth Final    Culture - Blood (collected 16 Sep 2020 14:04)  Source: .Blood Blood-Peripheral  Final Report (21 Sep 2020 15:00):    No Growth Final        RADIOLOGY & ADDITIONAL STUDIES:

## 2020-09-24 NOTE — PROGRESS NOTE ADULT - SUBJECTIVE AND OBJECTIVE BOX
Pt stable  + h/o colon cancer  scheduled for colonoscopy tomorrow      MEDICATIONS  (STANDING):  amLODIPine   Tablet 5 milliGRAM(s) Oral daily  bisacodyl 10 milliGRAM(s) Oral once  bisacodyl 10 milliGRAM(s) Oral at bedtime  carvedilol 25 milliGRAM(s) Oral every 12 hours  cefTRIAXone   IVPB 1000 milliGRAM(s) IV Intermittent every 24 hours  cefTRIAXone   IVPB      cyanocobalamin Injectable 1000 MICROGram(s) SubCutaneous daily  ergocalciferol 17338 Unit(s) Oral <User Schedule>  folic acid 1 milliGRAM(s) Oral daily  metroNIDAZOLE  IVPB      metroNIDAZOLE  IVPB 500 milliGRAM(s) IV Intermittent every 8 hours  pantoprazole    Tablet 40 milliGRAM(s) Oral every 12 hours  sucralfate suspension 1 Gram(s) Oral every 6 hours    MEDICATIONS  (PRN):  acetaminophen   Tablet .. 650 milliGRAM(s) Oral every 6 hours PRN Mild Pain (1 - 3)  melatonin 3 milliGRAM(s) Oral at bedtime PRN Insomnia      Allergies    No Known Allergies    Intolerances        Vital Signs Last 24 Hrs  T(C): 36.6 (24 Sep 2020 13:19), Max: 37.3 (23 Sep 2020 17:15)  T(F): 97.8 (24 Sep 2020 13:19), Max: 99.1 (23 Sep 2020 17:15)  HR: 78 (24 Sep 2020 13:19) (68 - 78)  BP: 120/51 (24 Sep 2020 13:19) (120/51 - 154/74)  BP(mean): --  RR: 17 (24 Sep 2020 13:19) (17 - 19)  SpO2: 97% (24 Sep 2020 13:19) (97% - 100%)    PHYSICAL EXAM:  General: NAD.  CVS: S1, S2  Chest: air entry bilaterally present  Abd: BS present, soft, non-tender      LABS:                        9.3    10.62 )-----------( 52       ( 24 Sep 2020 08:20 )             29.2     09-23    139  |  110<H>  |  12  ----------------------------<  109<H>  4.0   |  22  |  0.58    Ca    7.3<L>      23 Sep 2020 06:56  Phos  2.3     09-23  Mg     2.0     09-23        colon prep in progress  for colonoscopy tomorrow

## 2020-09-25 NOTE — PROGRESS NOTE ADULT - SUBJECTIVE AND OBJECTIVE BOX
INTERVAL HPI/OVERNIGHT EVENTS:  Pt. seen and examined at bedside resting comfortably. Pt c/o diarrhea since last night after drinking Golytely. Denies rectal bleeding. Denies abdominal pain, Nausea or vomiting. NPO p MN For colonoscopy today.  Denies fever/chills, chest pain, dyspnea, cough, dizziness.     Vital Signs Last 24 Hrs  T(C): 36.4 (25 Sep 2020 05:23), Max: 37.4 (24 Sep 2020 18:00)  T(F): 97.5 (25 Sep 2020 05:23), Max: 99.4 (24 Sep 2020 18:00)  HR: 63 (25 Sep 2020 05:23) (63 - 78)  BP: 141/70 (25 Sep 2020 05:23) (120/51 - 157/65)  RR: 18 (25 Sep 2020 05:23) (17 - 18)  SpO2: 99% (25 Sep 2020 05:23) (97% - 99%)    PHYSICAL EXAM:    GENERAL: Alert, oriented, NAD  CHEST/LUNG: Clear to auscultation bilaterally, respirations nonlabored  HEART: S1S2, RRR  ABDOMEN: + Bowel sounds, soft, NTND    I&O's Detail    23 Sep 2020 07:01  -  24 Sep 2020 07:00  --------------------------------------------------------  IN:    Oral Fluid: 573 mL  Total IN: 573 mL    OUT:    Voided (mL): 2 mL  Total OUT: 2 mL    Total NET: 571 mL    LABS:                        9.3    10.62 )-----------( 52       ( 24 Sep 2020 08:20 )             29.2     09-23    139  |  110<H>  |  12  ----------------------------<  109<H>  4.0   |  22  |  0.58    Ca    7.3<L>      23 Sep 2020 06:56  Phos  2.3     09-23  Mg     2.0     09-23      A/P: 80M with PMHx colon cancer (2010), fibrosarcoma of the foot, ulcerative colitis, chronic anemia, HTN admitted with Acute Blood Loss Anemia requiring 4uPRBC 2/2 GIB, Colitis, and ANNEMARIE (now resolved). S/p EGD 9/17 with hemorrhagic gastritis and gastric ulcers. S/p bone marrow bx 9/18 for pancytopenia  H/H stable, no further episodes of bleeding.     - protonix/carafate  - awaiting results of BM bx  - NPO p MN for colonoscopy today per GI, will f/u report  - no acute surgical intervention  - continue medical management and supportive care  - F/u AM labs  - will d/w Dr. Campbell

## 2020-09-25 NOTE — PROGRESS NOTE ADULT - SUBJECTIVE AND OBJECTIVE BOX
Patient is a 80y old  Male who presents with a chief complaint of     OVERNIGHT EVENTS: none     MEDICATIONS  (STANDING):  amLODIPine   Tablet 5 milliGRAM(s) Oral daily  bisacodyl 10 milliGRAM(s) Oral at bedtime  carvedilol 25 milliGRAM(s) Oral every 12 hours  dextrose 5% + sodium chloride 0.45%. 1000 milliLiter(s) (75 mL/Hr) IV Continuous <Continuous>  ergocalciferol 37617 Unit(s) Oral <User Schedule>  folic acid 1 milliGRAM(s) Oral daily  metroNIDAZOLE  IVPB      metroNIDAZOLE  IVPB 500 milliGRAM(s) IV Intermittent every 8 hours  pantoprazole    Tablet 40 milliGRAM(s) Oral every 12 hours  sucralfate suspension 1 Gram(s) Oral every 6 hours    MEDICATIONS  (PRN):  acetaminophen   Tablet .. 650 milliGRAM(s) Oral every 6 hours PRN Mild Pain (1 - 3)  melatonin 3 milliGRAM(s) Oral at bedtime PRN Insomnia      T(C): 37.3 (09-25-20 @ 16:50), Max: 37.3 (09-25-20 @ 16:50)  HR: 84 (09-25-20 @ 16:50) (59 - 84)  BP: 142/56 (09-25-20 @ 16:50) (107/57 - 159/77)  RR: 18 (09-25-20 @ 16:50) (14 - 18)  SpO2: 97% (09-25-20 @ 16:50) (97% - 100%)  Allergies    No Known Allergies    Intolerances        SUBJECTIVE: in bed in NAD, no acute events overnight  noted    PHYSICAL EXAM:  GENERAL: NAD, well-groomed, well-developed  HEAD:  Atraumatic, Normocephalic  EYES: EOMI, PERRLA, conjunctiva and sclera clear  ENMT: No tonsillar erythema, exudates, or enlargement; Moist mucous membranes, Good dentition, No lesions  NECK: Supple, No JVD, Normal thyroid  CHEST/LUNG: Clear to  auscultation bilaterally; No rales, rhonchi, wheezing, or rubs  bilaterally  HEART: Regular rate and rhythm; No murmurs, rubs, or gallops  ABDOMEN: Soft, Nontender, Nondistended; Bowel sounds present  EXTREMITIES:  2+ Peripheral Pulses, No clubbing, cyanosis, or edema BL LE  SKIN: No rashes or lesions  NERVOUS SYSTEM:  Alert & confused  s./p left tma   DTRs 2+ intact and symmetric, sensation intact BL  CAPILLARY BLOOD GLUCOSE                              8.8    8.44  )-----------( 67       ( 25 Sep 2020 08:17 )             28.3       CARDIAC MARKERS ( 24 Sep 2020 09:24 )  .044 ng/mL / x     / x     / x     / x            PT/INR - ( 25 Sep 2020 08:17 )   PT: 15.7 sec;   INR: 1.37 ratio         PTT - ( 25 Sep 2020 08:17 )  PTT:33.9 sec  142|109|11<115  3.4|24|0.52  7.3,2.1,2.4  09-25 @ 08:17        Cultures;   CAPILLARY BLOOD GLUCOSE        Lipid panel:     CARDIAC MARKERS ( 16 Sep 2020 09:50 )  .046 ng/mL / x     / x     / x     / x      < from: 12 Lead ECG (09.16.20 @ 09:24) >    Diagnosis Line Sinus rhythm with 1st degree AV block with occasional premature ventricular complexes  ST & T wave abnormality, consider lateral ischemia  Prolonged QT  Abnormal ECG  No previous ECGs available    < end of copied text >        RADIOLOGY & ADDITIONAL TESTS:      Imaging Personally Reviewed:  [ x] YES      Consultant(s) Notes Reviewed:  [x ] YES     Care Discussed with [x ] Consultants [X ] Patient [x ] Family  [x ]    [x ]  Other; RN

## 2020-09-25 NOTE — PROGRESS NOTE ADULT - SUBJECTIVE AND OBJECTIVE BOX
lying in bed    Vital Signs Last 24 Hrs  T(C): 36.4 (25 Sep 2020 05:23), Max: 37.4 (24 Sep 2020 18:00)  T(F): 97.5 (25 Sep 2020 05:23), Max: 99.4 (24 Sep 2020 18:00)  HR: 63 (25 Sep 2020 05:23) (63 - 78)  BP: 141/70 (25 Sep 2020 05:23) (120/51 - 157/65)  BP(mean): --  RR: 18 (25 Sep 2020 05:23) (17 - 18)  SpO2: 99% (25 Sep 2020 05:23) (97% - 99%)    PHYSICAL EXAM:    general - weak looking +  HEENT - No Icterus  CVS - RRR  RS - AE B/L  Abd - soft, NT  Ext - Pulses +        LABS:                        8.8    8.44  )-----------( 67       ( 25 Sep 2020 08:17 )             28.3     09-25    142  |  109<H>  |  11  ----------------------------<  115<H>  3.4<L>   |  24  |  0.52    Ca    7.3<L>      25 Sep 2020 08:17  Phos  2.4     09-25  Mg     2.1     09-25      PT/INR - ( 25 Sep 2020 08:17 )   PT: 15.7 sec;   INR: 1.37 ratio         PTT - ( 25 Sep 2020 08:17 )  PTT:33.9 sec      Culture - Urine (collected 20 Sep 2020 12:08)  Source: .Urine Clean Catch (Midstream)  Final Report (21 Sep 2020 07:27):    No growth    Culture - Urine (collected 17 Sep 2020 09:03)  Source: .Urine Clean Catch (Midstream)  Final Report (19 Sep 2020 08:46):    >=3 organisms. Probable collection contamination.    Culture - Blood (collected 16 Sep 2020 14:04)  Source: .Blood Blood-Peripheral  Final Report (21 Sep 2020 15:00):    No Growth Final    Culture - Blood (collected 16 Sep 2020 14:04)  Source: .Blood Blood-Peripheral  Final Report (21 Sep 2020 15:00):    No Growth Final        RADIOLOGY & ADDITIONAL STUDIES:

## 2020-09-25 NOTE — PROGRESS NOTE ADULT - PROBLEM SELECTOR PLAN 1
acute blood loss anemia from gib-s/p 3 units of blood    for egd and colonoscopy  today     9/18/2020 egd c/w gastric ulcer  , hgb this am low will transfuse 2 more units prbc   continue with Protonix    9/19/2020 hgb adequate response, continue to montior and transfuse appropriately    tumor markers noted and wnl colitis on CT scan continue with antibx jake add Flagyl    9/22/2020 transfused 2 more units on 9/21/2020 9/23/2020 hgb stable  9/24/2020 will finally go for colonoscopy on 9/25/2020 . f/u cxr and ekg

## 2020-09-25 NOTE — PROGRESS NOTE ADULT - SUBJECTIVE AND OBJECTIVE BOX
see colonoscopy report    Imp: h/o Colon Cancer; Diverticulosis; Rectal Ulcer    Plan: await path report; full fluids; CBC in AM Posterior Auricular Interpolation Flap Text: A decision was made to reconstruct the defect utilizing an interpolation axial flap and a staged reconstruction.  A telfa template was made of the defect.  This telfa template was then used to outline the posterior auricular interpolation flap.  The donor area for the pedicle flap was then injected with anesthesia.  The flap was excised through the skin and subcutaneous tissue down to the layer of the underlying musculature.  The pedicle flap was carefully excised within this deep plane to maintain its blood supply.  The edges of the donor site were undermined.   The donor site was closed in a primary fashion.  The pedicle was then rotated into position and sutured.  Once the tube was sutured into place, adequate blood supply was confirmed with blanching and refill.  The pedicle was then wrapped with xeroform gauze and dressed appropriately with a telfa and gauze bandage to ensure continued blood supply and protect the attached pedicle.

## 2020-09-26 NOTE — PROGRESS NOTE ADULT - PROBLEM SELECTOR PLAN 2
Due to GI Bleeding  s/p 3 units of blood initially  transfused 2 more units on 9/21/2020  Continue to follow; not yet clear if H/H has stabilized

## 2020-09-26 NOTE — PROGRESS NOTE ADULT - SUBJECTIVE AND OBJECTIVE BOX
Patient seen and examined at bedside with no complaints.   Denies any further bloody BMs.  Passing gas, denies abdominal pain, chest pain, SOB, fevers, chills, weakness.   Colonoscopy yesterday with GI.     Vital Signs Last 24 Hrs  T(F): 98.4 (09-26-20 @ 05:14), Max: 99.8 (09-26-20 @ 00:23)  HR: 81 (09-26-20 @ 05:14)  BP: 158/77 (09-26-20 @ 05:14)  RR: 18 (09-26-20 @ 05:14)  SpO2: 100% (09-26-20 @ 05:14)      GENERAL: Alert, NAD  CHEST/LUNG: Respirations nonlabored  HEART: Regular rate and rhythm  ABDOMEN: soft, Nontender, Nondistended, no rebound or guarding noted   EXTREMITIES:  no calf tenderness, No edema    I&O's Detail    25 Sep 2020 07:01  -  26 Sep 2020 07:00  --------------------------------------------------------  IN:    IV PiggyBack: 200 mL  Total IN: 200 mL    OUT:  Total OUT: 0 mL    Total NET: 200 mL      26 Sep 2020 07:01  -  26 Sep 2020 10:15  --------------------------------------------------------  IN:    dextrose 5% + sodium chloride 0.45%: 900 mL  Total IN: 900 mL    OUT:  Total OUT: 0 mL    Total NET: 900 mL    LABS:                        8.6    6.74  )-----------( 65       ( 26 Sep 2020 07:35 )             27.2     09-25    142  |  109<H>  |  11  ----------------------------<  115<H>  3.4<L>   |  24  |  0.52    Ca    7.3<L>      25 Sep 2020 08:17  Phos  2.4     09-25  Mg     2.1     09-25      PT/INR - ( 25 Sep 2020 08:17 )   PT: 15.7 sec;   INR: 1.37 ratio         PTT - ( 25 Sep 2020 08:17 )  PTT:33.9 sec    Impression: 80M with PMHx colon cancer (2010), fibrosarcoma of the foot, ulcerative colitis, chronic anemia, HTN admitted with Acute Blood Loss Anemia requiring 4uPRBC 2/2 GIB, Colitis, and ANNEMARIE (now resolved). S/p EGD 9/17 with hemorrhagic gastritis and gastric ulcers. S/p bone marrow bx 9/18 for pancytopenia.   H/H stable, no further episodes of bleeding. S/p colonoscopy 9/26 showing rectal ulcer and diverticulosis.     Plan  -No surgical intervention at this time.   -Continue protonix, carafate.  -GI follow up. Diet per GI.   -Medical management and supportive care.  -Will discuss with surgical attending.

## 2020-09-26 NOTE — PROGRESS NOTE ADULT - SUBJECTIVE AND OBJECTIVE BOX
Pt tolerated colonoscopy  +rectal ulcer  Bleeding likely secondary from previously seen gastric ulcer      MEDICATIONS  (STANDING):  amLODIPine   Tablet 5 milliGRAM(s) Oral daily  bisacodyl 10 milliGRAM(s) Oral at bedtime  carvedilol 25 milliGRAM(s) Oral every 12 hours  cyanocobalamin Injectable 1000 MICROGram(s) SubCutaneous daily  dextrose 5% + sodium chloride 0.45%. 1000 milliLiter(s) (75 mL/Hr) IV Continuous <Continuous>  ergocalciferol 47474 Unit(s) Oral <User Schedule>  folic acid 1 milliGRAM(s) Oral daily  metroNIDAZOLE  IVPB      metroNIDAZOLE  IVPB 500 milliGRAM(s) IV Intermittent every 8 hours  pantoprazole    Tablet 40 milliGRAM(s) Oral every 12 hours  sucralfate suspension 1 Gram(s) Oral every 6 hours    MEDICATIONS  (PRN):  acetaminophen   Tablet .. 650 milliGRAM(s) Oral every 6 hours PRN Mild Pain (1 - 3)  melatonin 3 milliGRAM(s) Oral at bedtime PRN Insomnia      Allergies    No Known Allergies    Intolerances        Vital Signs Last 24 Hrs  T(C): 37.4 (26 Sep 2020 16:55), Max: 37.7 (26 Sep 2020 00:23)  T(F): 99.4 (26 Sep 2020 16:55), Max: 99.8 (26 Sep 2020 00:23)  HR: 70 (26 Sep 2020 16:55) (68 - 81)  BP: 152/89 (26 Sep 2020 16:55) (138/61 - 158/77)  BP(mean): --  RR: 18 (26 Sep 2020 16:55) (18 - 18)  SpO2: 97% (26 Sep 2020 16:55) (97% - 100%)    PHYSICAL EXAM:  General: NAD.  CVS: S1, S2  Chest: air entry bilaterally present  Abd: BS present, soft, non-tender      LABS:                        8.6    6.74  )-----------( 65       ( 26 Sep 2020 07:35 )             27.2     09-25    142  |  109<H>  |  11  ----------------------------<  115<H>  3.4<L>   |  24  |  0.52    Ca    7.3<L>      25 Sep 2020 08:17  Phos  2.4     09-25  Mg     2.1     09-25      PT/INR - ( 25 Sep 2020 08:17 )   PT: 15.7 sec;   INR: 1.37 ratio         PTT - ( 25 Sep 2020 08:17 )  PTT:33.9 sec    labs stable  diet as tolerated  follow cbc

## 2020-09-26 NOTE — PROGRESS NOTE ADULT - PROBLEM SELECTOR PLAN 5
Paulette consulted (Ulises)                per previous provider conversation with daughter  longstanding never had biopsy on 9/18/2020 s/p BM biopsy follow up results  9/22/2020 still awaiting bm biopsy results  9/23/2020 previus provider called pathologist dr. Twyla Latham at [facility] and sent off for more staining states the marrow looks hypercellular similar to peripheral blood smear with monocytes but she has to wait to definitively for results could be MDS or CMML Paulette consulted (Ulises)  BM Bx done 9/18; results pending...                per previous provider conversation with daughter  longstanding never had biopsy on 9/18/2020 s/p BM biopsy follow up results  9/22/2020 still awaiting bm biopsy results  9/23/2020 previus provider called pathologist dr. Twyla Latham at [facility] and sent off for more staining states the marrow looks hypercellular similar to peripheral blood smear with monocytes but she has to wait to definitively for results could be MDS or CMML

## 2020-09-26 NOTE — PROGRESS NOTE ADULT - ASSESSMENT
79yo M w/ PMH HTN, colon cancer BIBA due to weakness. Daughter called EMS d/t 2 days of watery, non-bloody diarrhea, worsening weakness. Questionable hx of fall this AM? Pt is not on AC. Pt endorses lower abd pain, but no nausea vomiting

## 2020-09-26 NOTE — PROGRESS NOTE ADULT - PROBLEM SELECTOR PLAN 1
- f/u BM biopsy  - platelet counts have slightly increased  - rectal ulcer on coloscopy and gastric ulcer on EGD  -0 watch blood counts

## 2020-09-26 NOTE — PROGRESS NOTE ADULT - SUBJECTIVE AND OBJECTIVE BOX
lying in bed    Vital Signs Last 24 Hrs  T(C): 36.9 (26 Sep 2020 05:14), Max: 37.7 (26 Sep 2020 00:23)  T(F): 98.4 (26 Sep 2020 05:14), Max: 99.8 (26 Sep 2020 00:23)  HR: 81 (26 Sep 2020 05:14) (59 - 84)  BP: 158/77 (26 Sep 2020 05:14) (107/57 - 159/77)  BP(mean): --  RR: 18 (26 Sep 2020 05:14) (14 - 18)  SpO2: 100% (26 Sep 2020 05:14) (97% - 100%)    PHYSICAL EXAM:    general - AAO x 3  HEENT - No Icterus  CVS - RRR  RS - AE B/L  Abd - soft, NT  Ext - Pulses +        LABS:                        8.6    6.74  )-----------( 65       ( 26 Sep 2020 07:35 )             27.2     09-25    142  |  109<H>  |  11  ----------------------------<  115<H>  3.4<L>   |  24  |  0.52    Ca    7.3<L>      25 Sep 2020 08:17  Phos  2.4     09-25  Mg     2.1     09-25      PT/INR - ( 25 Sep 2020 08:17 )   PT: 15.7 sec;   INR: 1.37 ratio         PTT - ( 25 Sep 2020 08:17 )  PTT:33.9 sec      Culture - Urine (collected 20 Sep 2020 12:08)  Source: .Urine Clean Catch (Midstream)  Final Report (21 Sep 2020 07:27):    No growth    Culture - Urine (collected 17 Sep 2020 09:03)  Source: .Urine Clean Catch (Midstream)  Final Report (19 Sep 2020 08:46):    >=3 organisms. Probable collection contamination.    Culture - Blood (collected 16 Sep 2020 14:04)  Source: .Blood Blood-Peripheral  Final Report (21 Sep 2020 15:00):    No Growth Final    Culture - Blood (collected 16 Sep 2020 14:04)  Source: .Blood Blood-Peripheral  Final Report (21 Sep 2020 15:00):    No Growth Final        RADIOLOGY & ADDITIONAL STUDIES:

## 2020-09-26 NOTE — PROGRESS NOTE ADULT - PROBLEM SELECTOR PLAN 1
GI consulted (Anton)  EGD done 9/17 - showed gastric ulcer  continue with Protonix BID  Colonoscopy done 9/25 - showed ticosis and rectal ulcer  colitis seen on CT scan  added Flagyl GI consulted (Anton)  EGD done 9/17 - showed gastric ulcer; Bx benign  continue with Protonix BID  Colonoscopy done 9/25 - showed ticosis and rectal ulcer  colitis seen on CT scan  added Flagyl

## 2020-09-26 NOTE — PROGRESS NOTE ADULT - SUBJECTIVE AND OBJECTIVE BOX
Patient is a 80y old  Male who presents with a chief complaint of GI Bleeding, Abdominal pain (22 Sep 2020 09:34)      INTERVAL HPI/OVERNIGHT EVENTS:      REVIEW OF SYSTEMS:   Remaining ROS negative    MEDICATIONS  (STANDING):  amLODIPine   Tablet 5 milliGRAM(s) Oral daily  bisacodyl 10 milliGRAM(s) Oral at bedtime  carvedilol 25 milliGRAM(s) Oral every 12 hours  cyanocobalamin Injectable 1000 MICROGram(s) SubCutaneous daily  dextrose 5% + sodium chloride 0.45%. 1000 milliLiter(s) (75 mL/Hr) IV Continuous <Continuous>  ergocalciferol 67064 Unit(s) Oral <User Schedule>  folic acid 1 milliGRAM(s) Oral daily  metroNIDAZOLE  IVPB      metroNIDAZOLE  IVPB 500 milliGRAM(s) IV Intermittent every 8 hours  pantoprazole    Tablet 40 milliGRAM(s) Oral every 12 hours  sucralfate suspension 1 Gram(s) Oral every 6 hours    MEDICATIONS  (PRN):  acetaminophen   Tablet .. 650 milliGRAM(s) Oral every 6 hours PRN Mild Pain (1 - 3)  melatonin 3 milliGRAM(s) Oral at bedtime PRN Insomnia      Allergies    No Known Allergies    Intolerances        Vital Signs Last 24 Hrs  T(C): 37.2 (26 Sep 2020 11:53), Max: 37.7 (26 Sep 2020 00:23)  T(F): 98.9 (26 Sep 2020 11:53), Max: 99.8 (26 Sep 2020 00:23)  HR: 72 (26 Sep 2020 11:53) (68 - 81)  BP: 138/61 (26 Sep 2020 11:53) (138/61 - 158/77)  BP(mean): --  RR: 18 (26 Sep 2020 11:53) (18 - 18)  SpO2: 99% (26 Sep 2020 11:53) (98% - 100%)    PHYSICAL EXAM:  GENERAL: NAD  HEAD:  Atraumatic, Normocephalic  EYES: EOMI, PERRLA, conjunctiva and sclera clear  ENT: O/P Clear  NECK: Supple, No JVD  NERVOUS SYSTEM:  No focal deficits  CHEST/LUNG: Clear to percussion bilaterally; No rales, rhonchi, wheezing  HEART: Regular rate and rhythm; No murmurs, rubs, or gallops  ABDOMEN: Soft, Nontender, Nondistended; Bowel sounds present  EXTREMITIES:  2+ Peripheral Pulses, No clubbing, cyanosis, or edema  SKIN: No rashes or lesions    LABS:                        8.6    6.74  )-----------( 65       ( 26 Sep 2020 07:35 )             27.2     09-25    142  |  109<H>  |  11  ----------------------------<  115<H>  3.4<L>   |  24  |  0.52    Ca    7.3<L>      25 Sep 2020 08:17  Phos  2.4     09-25  Mg     2.1     09-25      PT/INR - ( 25 Sep 2020 08:17 )   PT: 15.7 sec;   INR: 1.37 ratio         PTT - ( 25 Sep 2020 08:17 )  PTT:33.9 sec    CAPILLARY BLOOD GLUCOSE          RADIOLOGY & ADDITIONAL TESTS:    Imaging Personally Reviewed:  [ ] YES  [ ] NO    Consultant(s) Notes Reviewed:  [ ] YES  [ ] NO    Care Discussed with Consultants/Other Providers [ ] YES  [ ] NO Patient is a 80y old  Male who presents with a chief complaint of GI Bleeding, Abdominal pain (22 Sep 2020 09:34)      INTERVAL HPI/OVERNIGHT EVENTS:    No specific complaints    REVIEW OF SYSTEMS:   Remaining ROS negative    MEDICATIONS  (STANDING):  amLODIPine   Tablet 5 milliGRAM(s) Oral daily  bisacodyl 10 milliGRAM(s) Oral at bedtime  carvedilol 25 milliGRAM(s) Oral every 12 hours  cyanocobalamin Injectable 1000 MICROGram(s) SubCutaneous daily  dextrose 5% + sodium chloride 0.45%. 1000 milliLiter(s) (75 mL/Hr) IV Continuous <Continuous>  ergocalciferol 55026 Unit(s) Oral <User Schedule>  folic acid 1 milliGRAM(s) Oral daily  metroNIDAZOLE  IVPB      metroNIDAZOLE  IVPB 500 milliGRAM(s) IV Intermittent every 8 hours  pantoprazole    Tablet 40 milliGRAM(s) Oral every 12 hours  sucralfate suspension 1 Gram(s) Oral every 6 hours    MEDICATIONS  (PRN):  acetaminophen   Tablet .. 650 milliGRAM(s) Oral every 6 hours PRN Mild Pain (1 - 3)  melatonin 3 milliGRAM(s) Oral at bedtime PRN Insomnia      Allergies    No Known Allergies    Intolerances        Vital Signs Last 24 Hrs  T(C): 37.2 (26 Sep 2020 11:53), Max: 37.7 (26 Sep 2020 00:23)  T(F): 98.9 (26 Sep 2020 11:53), Max: 99.8 (26 Sep 2020 00:23)  HR: 72 (26 Sep 2020 11:53) (68 - 81)  BP: 138/61 (26 Sep 2020 11:53) (138/61 - 158/77)  BP(mean): --  RR: 18 (26 Sep 2020 11:53) (18 - 18)  SpO2: 99% (26 Sep 2020 11:53) (98% - 100%)    PHYSICAL EXAM:  GENERAL: NAD  HEAD:  Atraumatic, Normocephalic  EYES: EOMI, PERRLA, conjunctiva and sclera clear  ENT: O/P Clear  NECK: Supple, No JVD  NERVOUS SYSTEM:  No focal deficits  CHEST/LUNG: Clear to percussion bilaterally; No rales, rhonchi, wheezing  HEART: Regular rate and rhythm; No murmurs, rubs, or gallops  ABDOMEN: Soft, Nontender, Nondistended; Bowel sounds present  EXTREMITIES:  2+ Peripheral Pulses, No clubbing, cyanosis, or edema  SKIN: No rashes or lesions    LABS:                        8.6    6.74  )-----------( 65       ( 26 Sep 2020 07:35 )             27.2     09-25    142  |  109<H>  |  11  ----------------------------<  115<H>  3.4<L>   |  24  |  0.52    Ca    7.3<L>      25 Sep 2020 08:17  Phos  2.4     09-25  Mg     2.1     09-25      PT/INR - ( 25 Sep 2020 08:17 )   PT: 15.7 sec;   INR: 1.37 ratio         PTT - ( 25 Sep 2020 08:17 )  PTT:33.9 sec    CAPILLARY BLOOD GLUCOSE          RADIOLOGY & ADDITIONAL TESTS:    Imaging Personally Reviewed:  [ ] YES  [ ] NO    Consultant(s) Notes Reviewed:  [ ] YES  [ ] NO    Care Discussed with Consultants/Other Providers [ ] YES  [ ] NO

## 2020-09-26 NOTE — PROGRESS NOTE ADULT - PROBLEM SELECTOR PLAN 10
on admission with some ischemic changes in lateral lead ing of hgb o 6.4 and mildly elevated troponin presumed to be due to stress induced ischemia and troponin leak secondary to low lhgb .    will repeat ekg and get one more set or tropinin.    no ASA in ligt of GI Bleed:-   plans for colonoscopy in am , s/p EGD earlier in admission on admission with some ischemic changes in lateral leads in setting of hgb of 6.4 and mildly elevated troponin presumed to be due to stress induced ischemia and troponin leak   no ASA in light of GI Bleed  Repeat EKG shows no ischemic changes  Repeat Troponin normalized

## 2020-09-27 NOTE — PROGRESS NOTE ADULT - PROBLEM SELECTOR PLAN 2
Due to GI Bleeding  s/p 3 units of blood initially  transfused 2 more units on 9/21/2020  Continue to follow; H/H has stabilized Due to GI Bleeding  s/p 3 units of blood initially on 9/16/2020  transfused 2 more units on 9/18/2020  transfused 2 more units on 9/21/2020  Continue to follow; H/H has stabilized

## 2020-09-27 NOTE — PROGRESS NOTE ADULT - SUBJECTIVE AND OBJECTIVE BOX
Pt. seen and examined at bedside resting comfortably. No new events, Offers no complaints. Tolerating full liquid diet, requesting for solid food. Denies further bloody bowel movements. +flatus.   Colonoscopy results discussed with patient.   Denies fever/chills, abdominal pain, chest pain, dyspnea, cough, dizziness.     Vital Signs Last 24 Hrs  T(C): 37.1 (27 Sep 2020 05:45), Max: 37.4 (26 Sep 2020 16:55)  T(F): 98.8 (27 Sep 2020 05:45), Max: 99.4 (26 Sep 2020 16:55)  HR: 86 (27 Sep 2020 05:45) (70 - 86)  BP: 138/78 (27 Sep 2020 05:45) (128/59 - 152/89)  RR: 18 (27 Sep 2020 05:45) (17 - 18)  SpO2: 98% (27 Sep 2020 05:45) (97% - 99%)    PHYSICAL EXAM:    GENERAL: NAD  CHEST/LUNG: Clear to ausculation, bilaterally, non labored breathing    HEART: S1S2, RRR   ABDOMEN: non distended, +BS, soft, non tender, no guarding  EXTREMITIES:  calf soft, non tender b/l. 2+ distal pulses b/l.     I&O's Detail    26 Sep 2020 07:01  -  27 Sep 2020 07:00  --------------------------------------------------------  IN:    dextrose 5% + sodium chloride 0.45%: 1800 mL    IV PiggyBack: 200 mL  Total IN: 2000 mL    OUT:    Voided (mL): 1450 mL  Total OUT: 1450 mL    Total NET: 550 mL    LABS:                        8.9    6.45  )-----------( x        ( 27 Sep 2020 08:28 )             26.9     09-27    140  |  112<H>  |  7   ----------------------------<  111<H>  3.7   |  22  |  0.56    Ca    7.4<L>      27 Sep 2020 08:28  Phos  2.2     09-27  Mg     2.3     09-27    Assessment: 80M with PMHx of HTN, chronic anemia, ulcerative colitis, colon cancer s/p colon resection 2010, and fibrosarcoma of the right foot admitted with ABLA requiring 4uPRBCs 2/2 to GIB, colitis, dilated appendix, ANNEMARIE (improved), and pancytopenia. Patient clinically improving.   S/p EDG 9/17 with hemorrhagic gastritis and gastric ulcers. S/p bone marrow bx 9/18 for pancytopenia.   H/H stable, no further episodes of bleeding. S/p colonoscopy 9/25 showing rectal ulcer and diverticulosis. Tolerating full liquid diet with normal bowel function.     Plan:  -No sx intervention at this time  -Patient surgically stable. Surgery will sign off the case as discussed with Dr. Campbell, please call back with any questions/concerns. Thank you.   -Advanced to regular diet,  GI follow up  -medical management and supportive care

## 2020-09-27 NOTE — PROGRESS NOTE ADULT - SUBJECTIVE AND OBJECTIVE BOX
feeling weak +    Vital Signs Last 24 Hrs  T(C): 37.1 (27 Sep 2020 05:45), Max: 37.4 (26 Sep 2020 16:55)  T(F): 98.8 (27 Sep 2020 05:45), Max: 99.4 (26 Sep 2020 16:55)  HR: 86 (27 Sep 2020 05:45) (70 - 86)  BP: 138/78 (27 Sep 2020 05:45) (128/59 - 152/89)  BP(mean): --  RR: 18 (27 Sep 2020 05:45) (17 - 18)  SpO2: 98% (27 Sep 2020 05:45) (97% - 99%)    PHYSICAL EXAM:    general - AAO x 3  HEENT - No Icterus  CVS - RRR  RS - AE B/L  Abd - soft, NT  Ext - Pulses +        LABS:                        8.9    6.45  )-----------( 66       ( 27 Sep 2020 08:28 )             26.9     09-27    140  |  112<H>  |  7   ----------------------------<  111<H>  3.7   |  22  |  0.56    Ca    7.4<L>      27 Sep 2020 08:28  Phos  2.2     09-27  Mg     2.3     09-27            Culture - Urine (collected 20 Sep 2020 12:08)  Source: .Urine Clean Catch (Midstream)  Final Report (21 Sep 2020 07:27):    No growth        RADIOLOGY & ADDITIONAL STUDIES:

## 2020-09-27 NOTE — PROGRESS NOTE ADULT - SUBJECTIVE AND OBJECTIVE BOX
Pt stable - no bleeding  H/H slightly better      MEDICATIONS  (STANDING):  amLODIPine   Tablet 5 milliGRAM(s) Oral daily  bisacodyl 10 milliGRAM(s) Oral at bedtime  carvedilol 25 milliGRAM(s) Oral every 12 hours  cyanocobalamin Injectable 1000 MICROGram(s) SubCutaneous daily  dextrose 5% + sodium chloride 0.45%. 1000 milliLiter(s) (75 mL/Hr) IV Continuous <Continuous>  ergocalciferol 57845 Unit(s) Oral <User Schedule>  folic acid 1 milliGRAM(s) Oral daily  metroNIDAZOLE  IVPB      metroNIDAZOLE  IVPB 500 milliGRAM(s) IV Intermittent every 8 hours  pantoprazole    Tablet 40 milliGRAM(s) Oral every 12 hours  sucralfate suspension 1 Gram(s) Oral every 6 hours    MEDICATIONS  (PRN):  acetaminophen   Tablet .. 650 milliGRAM(s) Oral every 6 hours PRN Mild Pain (1 - 3)  melatonin 3 milliGRAM(s) Oral at bedtime PRN Insomnia      Allergies    No Known Allergies    Intolerances        Vital Signs Last 24 Hrs  T(C): 37.2 (27 Sep 2020 11:20), Max: 37.4 (26 Sep 2020 16:55)  T(F): 98.9 (27 Sep 2020 11:20), Max: 99.4 (26 Sep 2020 16:55)  HR: 82 (27 Sep 2020 11:20) (70 - 86)  BP: 150/62 (27 Sep 2020 11:20) (128/59 - 152/89)  BP(mean): --  RR: 18 (27 Sep 2020 11:20) (17 - 18)  SpO2: 99% (27 Sep 2020 11:20) (97% - 99%)    PHYSICAL EXAM:  General: NAD.  CVS: S1, S2  Chest: air entry bilaterally present  Abd: BS present, soft, non-tender      LABS:                        8.9    6.45  )-----------( 66       ( 27 Sep 2020 08:28 )             26.9     09-27    140  |  112<H>  |  7   ----------------------------<  111<H>  3.7   |  22  |  0.56    Ca    7.4<L>      27 Sep 2020 08:28  Phos  2.2     09-27  Mg     2.3     09-27        continue protonix and carafate

## 2020-09-27 NOTE — PROGRESS NOTE ADULT - ASSESSMENT
81 yo M w/ PMH HTN, colon cancer BIBA due to weakness. Daughter called EMS d/t 2 days of watery, non-bloody diarrhea, worsening weakness. Questionable hx of fall this AM? Pt is not on AC. Pt endorses lower abd pain, but no nausea vomiting

## 2020-09-27 NOTE — PROGRESS NOTE ADULT - PROBLEM SELECTOR PLAN 10
on admission with some ischemic changes in lateral leads in setting of hgb of 6.4 and mildly elevated troponin presumed to be due to stress induced ischemia and troponin leak   no ASA in light of GI Bleed  Repeat EKG shows no ischemic changes  Repeat Troponin normalized

## 2020-09-27 NOTE — PROGRESS NOTE ADULT - PROBLEM SELECTOR PLAN 5
Paulette consulted (Ulises)  BM Bx done 9/18; results pending...                per previous provider conversation with daughter  longstanding never had biopsy on 9/18/2020 s/p BM biopsy follow up results  9/22/2020 still awaiting bm biopsy results  9/23/2020 previus provider called pathologist dr. Twyla [facility] at [facility] and sent off for more staining states the marrow looks hypercellular similar to peripheral blood smear with monocytes but she has to wait to definitively for results could be MDS or CMML

## 2020-09-27 NOTE — PROGRESS NOTE ADULT - PROBLEM SELECTOR PLAN 1
GI consulted (Anton)  EGD done 9/17 - showed gastric ulcer; Bx benign  continue with Protonix BID  Colonoscopy done 9/25 - showed ticosis and rectal ulcer  colitis seen on CT scan  added Flagyl

## 2020-09-27 NOTE — PROGRESS NOTE ADULT - SUBJECTIVE AND OBJECTIVE BOX
Patient is a 80y old  Male who presents with a chief complaint of GI Bleeding, Abdominal pain (22 Sep 2020 09:34)      INTERVAL HPI/OVERNIGHT EVENTS:      REVIEW OF SYSTEMS:   Remaining ROS negative    MEDICATIONS  (STANDING):  amLODIPine   Tablet 5 milliGRAM(s) Oral daily  bisacodyl 10 milliGRAM(s) Oral at bedtime  carvedilol 25 milliGRAM(s) Oral every 12 hours  cyanocobalamin Injectable 1000 MICROGram(s) SubCutaneous daily  dextrose 5% + sodium chloride 0.45%. 1000 milliLiter(s) (75 mL/Hr) IV Continuous <Continuous>  ergocalciferol 13426 Unit(s) Oral <User Schedule>  folic acid 1 milliGRAM(s) Oral daily  metroNIDAZOLE  IVPB      metroNIDAZOLE  IVPB 500 milliGRAM(s) IV Intermittent every 8 hours  pantoprazole    Tablet 40 milliGRAM(s) Oral every 12 hours  sucralfate suspension 1 Gram(s) Oral every 6 hours    MEDICATIONS  (PRN):  acetaminophen   Tablet .. 650 milliGRAM(s) Oral every 6 hours PRN Mild Pain (1 - 3)  melatonin 3 milliGRAM(s) Oral at bedtime PRN Insomnia      Allergies    No Known Allergies    Intolerances        Vital Signs Last 24 Hrs  T(C): 37.2 (27 Sep 2020 11:20), Max: 37.4 (26 Sep 2020 16:55)  T(F): 98.9 (27 Sep 2020 11:20), Max: 99.4 (26 Sep 2020 16:55)  HR: 82 (27 Sep 2020 11:20) (70 - 86)  BP: 150/62 (27 Sep 2020 11:20) (128/59 - 152/89)  BP(mean): --  RR: 18 (27 Sep 2020 11:20) (17 - 18)  SpO2: 99% (27 Sep 2020 11:20) (97% - 99%)    PHYSICAL EXAM:  GENERAL: NAD  HEAD:  Atraumatic, Normocephalic  EYES: EOMI, PERRLA, conjunctiva and sclera clear  ENT: O/P Clear  NECK: Supple, No JVD  NERVOUS SYSTEM:  No focal deficits  CHEST/LUNG: Clear to percussion bilaterally; No rales, rhonchi, wheezing  HEART: Regular rate and rhythm; No murmurs, rubs, or gallops  ABDOMEN: Soft, Nontender, Nondistended; Bowel sounds present  EXTREMITIES:  2+ Peripheral Pulses, No clubbing, cyanosis, or edema  SKIN: No rashes or lesions    LABS:                        8.9    6.45  )-----------( 66       ( 27 Sep 2020 08:28 )             26.9     09-27    140  |  112<H>  |  7   ----------------------------<  111<H>  3.7   |  22  |  0.56    Ca    7.4<L>      27 Sep 2020 08:28  Phos  2.2     09-27  Mg     2.3     09-27          CAPILLARY BLOOD GLUCOSE          RADIOLOGY & ADDITIONAL TESTS:    Imaging Personally Reviewed:  [ ] YES  [ ] NO    Consultant(s) Notes Reviewed:  [ ] YES  [ ] NO    Care Discussed with Consultants/Other Providers [ ] YES  [ ] NO Patient is a 80y old  Male who presents with a chief complaint of GI Bleeding, Abdominal pain (22 Sep 2020 09:34)      INTERVAL HPI/OVERNIGHT EVENTS:    Started on regular diet; tolerating; still getting 2 "very soft" BM's per day; no blood in it now.    REVIEW OF SYSTEMS:   Remaining ROS negative    MEDICATIONS  (STANDING):  amLODIPine   Tablet 5 milliGRAM(s) Oral daily  bisacodyl 10 milliGRAM(s) Oral at bedtime  carvedilol 25 milliGRAM(s) Oral every 12 hours  cyanocobalamin Injectable 1000 MICROGram(s) SubCutaneous daily  dextrose 5% + sodium chloride 0.45%. 1000 milliLiter(s) (75 mL/Hr) IV Continuous <Continuous>  ergocalciferol 43272 Unit(s) Oral <User Schedule>  folic acid 1 milliGRAM(s) Oral daily  metroNIDAZOLE  IVPB      metroNIDAZOLE  IVPB 500 milliGRAM(s) IV Intermittent every 8 hours  pantoprazole    Tablet 40 milliGRAM(s) Oral every 12 hours  sucralfate suspension 1 Gram(s) Oral every 6 hours    MEDICATIONS  (PRN):  acetaminophen   Tablet .. 650 milliGRAM(s) Oral every 6 hours PRN Mild Pain (1 - 3)  melatonin 3 milliGRAM(s) Oral at bedtime PRN Insomnia      Allergies    No Known Allergies    Intolerances        Vital Signs Last 24 Hrs  T(C): 37.2 (27 Sep 2020 11:20), Max: 37.4 (26 Sep 2020 16:55)  T(F): 98.9 (27 Sep 2020 11:20), Max: 99.4 (26 Sep 2020 16:55)  HR: 82 (27 Sep 2020 11:20) (70 - 86)  BP: 150/62 (27 Sep 2020 11:20) (128/59 - 152/89)  BP(mean): --  RR: 18 (27 Sep 2020 11:20) (17 - 18)  SpO2: 99% (27 Sep 2020 11:20) (97% - 99%)    PHYSICAL EXAM:  GENERAL: NAD  HEAD:  Atraumatic, Normocephalic  EYES: EOMI, PERRLA, conjunctiva and sclera clear  ENT: O/P Clear  NECK: Supple, No JVD  NERVOUS SYSTEM:  No focal deficits  CHEST/LUNG: Clear to percussion bilaterally; No rales, rhonchi, wheezing  HEART: Regular rate and rhythm; No murmurs, rubs, or gallops  ABDOMEN: Soft, Nontender, Nondistended; Bowel sounds present and very active.  EXTREMITIES:  2+ Peripheral Pulses, No clubbing, cyanosis, or edema  SKIN: No rashes or lesions    LABS:                        8.9    6.45  )-----------( 66       ( 27 Sep 2020 08:28 )             26.9     09-27    140  |  112<H>  |  7   ----------------------------<  111<H>  3.7   |  22  |  0.56    Ca    7.4<L>      27 Sep 2020 08:28  Phos  2.2     09-27  Mg     2.3     09-27          CAPILLARY BLOOD GLUCOSE          RADIOLOGY & ADDITIONAL TESTS:    Imaging Personally Reviewed:  [ ] YES  [ ] NO    Consultant(s) Notes Reviewed:  [ ] YES  [ ] NO    Care Discussed with Consultants/Other Providers [ ] YES  [ ] NO

## 2020-09-27 NOTE — PROGRESS NOTE ADULT - ATTENDING COMMENTS
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    Disp: Rehab when medically stable; possibly 9/28 or 9/29 if current counts remain stable and the diarrhea improves.

## 2020-09-28 NOTE — DISCHARGE NOTE PROVIDER - NSDCMRMEDTOKEN_GEN_ALL_CORE_FT
amLODIPine 5 mg oral tablet: 1 tab(s) orally once a day  bisacodyl 5 mg oral delayed release tablet: 2 tab(s) orally once a day (at bedtime)  carvedilol 25 mg oral tablet: 1 tab(s) orally 2 times a day  ergocalciferol 50,000 intl units (1.25 mg) oral capsule: 1 cap(s) orally once a week  folic acid 1 mg oral tablet: 1 tab(s) orally once a day  lisinopril 20 mg oral tablet: 1 tab(s) orally once a day  pantoprazole 40 mg oral delayed release tablet: 1 tab(s) orally every 12 hours  sucralfate 1 g/10 mL oral suspension: 10 milliliter(s) orally every 6 hours  zinc oxide 20% topical ointment: 1 application topically 2 times a day   amLODIPine 5 mg oral tablet: 1 tab(s) orally once a day  bisacodyl 5 mg oral delayed release tablet: 2 tab(s) orally once a day (at bedtime)  carvedilol 25 mg oral tablet: 1 tab(s) orally 2 times a day  dexamethasone: 40 milligram(s) intravenous once a day  folic acid 1 mg oral tablet: 1 tab(s) orally once a day  hydrocortisone 100 mg/60 mL rectal suspension: 60 milliliter(s) rectal once a day (at bedtime)  lisinopril 20 mg oral tablet: 1 tab(s) orally once a day  pantoprazole 40 mg oral delayed release tablet: 1 tab(s) orally every 12 hours  sucralfate 1 g/10 mL oral suspension: 10 milliliter(s) orally every 6 hours  zinc oxide 20% topical ointment: 1 application topically 2 times a day   amLODIPine 10 mg oral tablet: 1 tab(s) orally once a day  B-12 1000 mcg oral tablet: 1 tab(s) orally once a day   bisacodyl 5 mg oral delayed release tablet: 2 tab(s) orally once a day (at bedtime)  carvedilol 25 mg oral tablet: 1 tab(s) orally 2 times a day  dexamethasone 6 mg oral tablet: 6 tab(s) orally once a day   folic acid 1 mg oral tablet: 1 tab(s) orally once a day  hydrocortisone 100 mg/60 mL rectal suspension: 60 milliliter(s) rectal once a day (at bedtime)  lisinopril 20 mg oral tablet: 1 tab(s) orally once a day  pantoprazole 40 mg oral delayed release tablet: 1 tab(s) orally every 12 hours  sucralfate 1 g/10 mL oral suspension: 10 milliliter(s) orally every 6 hours  zinc oxide 20% topical ointment: 1 application topically 2 times a day

## 2020-09-28 NOTE — PROGRESS NOTE ADULT - ATTENDING COMMENTS
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    Disp: Rehab being arranged

## 2020-09-28 NOTE — PROGRESS NOTE ADULT - PROBLEM SELECTOR PLAN 2
Due to GI Bleeding  s/p 3 units of blood initially on 9/16/2020  transfused 2 more units on 9/18/2020  transfused 2 more units on 9/21/2020  Continue to follow; H/H has stabilized nicely

## 2020-09-28 NOTE — PROGRESS NOTE ADULT - PROBLEM SELECTOR PLAN 5
Heme consulted (Ulises)  BM Bx done 9/18; results pending...  Low risk of bleeding at this point; OK to f/u as outpatient                per previous provider conversation with daughter  longstanding never had biopsy on 9/18/2020 s/p BM biopsy follow up results  9/22/2020 still awaiting bm biopsy results  9/23/2020 previus provider called pathologist dr. Tywla [facility] and sent off for more staining states the marrow looks hypercellular similar to peripheral blood smear with monocytes but she has to wait to definitively for results could be MDS or CMML

## 2020-09-28 NOTE — DISCHARGE NOTE PROVIDER - HOSPITAL COURSE
81 yo M w/ PMH HTN, colon cancer BIBA due to weakness. Daughter called EMS d/t 2 days of watery, non-bloody diarrhea, worsening weakness. Questionable hx of fall this AM? Pt is not on AC. 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     Problem/Plan - 1:  ·  Problem: Gastrointestinal bleeding.  Plan: GI consulted (Anton)  EGD done 9/17 - showed gastric ulcer; Bx benign  continue with Protonix BID  Colonoscopy done 9/25 - showed ticosis and rectal ulcer  Bx showed no neoplasm  colitis seen on CT scan     Problem/Plan - 2:  ·  Problem: Anemia due to acute blood loss.  Plan: Due to GI Bleeding  s/p 3 units of blood initially on 9/16/2020  transfused 2 more units on 9/18/2020  transfused 2 more units on 9/21/2020  H/H has stabilized nicely - as of 9/28, Hgb = 8.9     Problem/Plan - 3:  ·  Problem: Essential hypertension.  Plan: Normally takes Coreg 25mg BID, Lisinopril 20mg, Nifedipine-XR 60mg  Continue Coreg; Nifedipine changed to Amlodipine  Lisinopril held initially due to borderline-low BP readings, but now restarted at his usual dose     Problem/Plan - 4:  ·  Problem: Hypokalemia.  Plan: supplemented.      Problem/Plan - 5:  ·  Problem: Thrombocytopenia.  Plan: Heme consulted (Ulises)  BM Bx done 9/18; results pending...  Per pathologist Dr. Twyla Latham at [facility], sent off for more staining; states the marrow looks hypercellular; could be MDS or CMML.   Low risk of bleeding at this point; OK to f/u as outpatient     Problem/Plan - 6:  Problem: B12 deficiency. Plan: was started on supplement.     Problem/Plan - 7:  ·  Problem: Folate deficiency.  Plan: was started on supplement.      Problem/Plan - 8:  ·  Problem: Vitamin D deficiency.  Plan: was started on replacement.      Problem/Plan - 9:  ·  Problem: ANNEMARIE (acute kidney injury).  Plan: was present on admission  has since resolved.      Problem/Plan - 10:  Problem: Abnormal EKG. Plan; on admission with some ischemic changes in lateral leads in setting of hgb of 6.4 and mildly elevated troponin presumed to be due to stress induced ischemia and troponin leak   no ASA in light of GI Bleed  Repeat EKG shows no ischemic changes  Repeat Troponin normalized.     79 yo M w/ PMH HTN, colon cancer BIBA due to weakness. Daughter called EMS d/t 2 days of watery, non-bloody diarrhea, worsening weakness. Questionable hx of fall this AM? Pt is not on AC. 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     Problem/Plan - 1:  ·  Problem: Gastrointestinal bleeding.  Plan: GI consulted (Anton)  EGD done 9/17 - showed gastric ulcer; Bx benign  continue with Protonix BID  Colonoscopy done 9/25 - showed ticosis and rectal ulcer  Bx showed no neoplasm  colitis seen on CT scan  1 week of cortisol enema added by GI  Will need GI followoup     Problem/Plan - 2:  ·  Problem: Anemia due to acute blood loss.  Plan: Due to GI Bleeding  s/p 3 units of blood initially on 9/16/2020  transfused 2 more units on 9/18/2020  transfused 2 more units on 9/21/2020  H/H has stabilized nicely - as of 9/28, Hgb = 8.9     Problem/Plan - 3:  ·  Problem: Essential hypertension.  Plan: Normally takes Coreg 25mg BID, Lisinopril 20mg, Nifedipine-XR 60mg  Continue Coreg; Nifedipine changed to Amlodipine  Lisinopril held initially due to borderline-low BP readings, but now restarted at his usual dose     Problem/Plan - 4:  ·  Problem: Hypokalemia.  Plan: supplemented.      Problem/Plan - 5:  ·  Problem: Thrombocytopenia.  Plan: Heme consulted (Ulises)  BM Bx done 9/18; results pending, but preliminary reading, given verbally, shows no clear evidence of MDS  Low risk of bleeding at this point; OK to f/u as outpatient  Dr. Montgomery added a short course of Decadron  Should recheck CBC in about 5 days     Problem/Plan - 6:  Problem: B12 deficiency. Plan: was started on supplement.     Problem/Plan - 7:  ·  Problem: Folate deficiency.  Plan: was started on supplement.      Problem/Plan - 8:  ·  Problem: Vitamin D deficiency.  Plan: was started on replacement.      Problem/Plan - 9:  ·  Problem: ANNEMARIE (acute kidney injury).  Plan: was present on admission  has since resolved.      Problem/Plan - 10:  Problem: Abnormal EKG. Plan; on admission with some ischemic changes in lateral leads in setting of hgb of 6.4 and mildly elevated troponin presumed to be due to stress induced ischemia and troponin leak   no ASA in light of GI Bleed  Repeat EKG shows no ischemic changes  Repeat Troponin normalized.     81 yo M w/ PMH HTN, colon cancer BIBA due to weakness. Daughter called EMS d/t 2 days of watery, non-bloody diarrhea, worsening weakness. Questionable hx of fall this AM? Pt is not on AC. 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     Problem/Plan - 1:  ·  Problem: Gastrointestinal bleeding.  Plan: GI consulted (Anton)  EGD done 9/17 - showed gastric ulcer; Bx benign  continue with Protonix BID  Colonoscopy done 9/25 - showed ticosis and rectal ulcer  Bx showed no neoplasm  colitis seen on CT scan  1 week of cortisol enema added by GI  Will need GI followoup     Problem/Plan - 2:  ·  Problem: Anemia due to acute blood loss.  Plan: Due to GI Bleeding  s/p 3 units of blood initially on 9/16/2020  transfused 2 more units on 9/18/2020  transfused 2 more units on 9/21/2020  H/H has stabilized nicely - as of 9/28, Hgb = 8.9     Problem/Plan - 3:  ·  Problem: Essential hypertension.  Plan: Normally takes Coreg 25mg BID, Lisinopril 20mg, Nifedipine-XR 60mg  Continue Coreg; Nifedipine changed to Amlodipine  Lisinopril held initially due to borderline-low BP readings, but now restarted at his usual dose     Problem/Plan - 4:  ·  Problem: Hypokalemia.  Plan: supplemented.      Problem/Plan - 5:  ·  Problem: Thrombocytopenia.  Plan: Heme consulted (Ulises)  BM Bx done 9/18; results pending, but preliminary reading, given verbally, shows no clear evidence of MDS  Low risk of bleeding at this point; OK to f/u as outpatient  Dr. Montgomery added a short course of Decadron  Should recheck CBC in about 5 days     Problem/Plan - 6:  Problem: B12 deficiency. Plan: was started on supplement.     Problem/Plan - 7:  ·  Problem: Folate deficiency.  Plan: was started on supplement.      Problem/Plan - 8:  ·  Problem: Vitamin D deficiency.  Plan: was started on replacement.      Problem/Plan - 9:  ·  Problem: ANNEMARIE (acute kidney injury).  Plan: was present on admission  has since resolved.      Problem/Plan - 10:  Problem: Abnormal EKG. Plan; on admission with some ischemic changes in lateral leads in setting of hgb of 6.4 and mildly elevated troponin presumed to be due to stress induced ischemia and troponin leak   no ASA in light of GI Bleed  Repeat EKG shows no ischemic changes  Repeat Troponin normalized.    TIME SPENT COMPLETING DISCHARGE AND COORDINATING CARE WITH NURSING,  AND CASE MANAGEMENT APPROX 40MINUTES

## 2020-09-28 NOTE — PROGRESS NOTE ADULT - SUBJECTIVE AND OBJECTIVE BOX
lying in bed	    Vital Signs Last 24 Hrs  T(C): 36.9 (28 Sep 2020 06:17), Max: 37.2 (27 Sep 2020 11:20)  T(F): 98.4 (28 Sep 2020 06:17), Max: 98.9 (27 Sep 2020 11:20)  HR: 67 (28 Sep 2020 06:17) (67 - 89)  BP: 140/60 (28 Sep 2020 06:17) (133/56 - 150/62)  BP(mean): --  RR: 18 (28 Sep 2020 06:17) (18 - 18)  SpO2: 100% (28 Sep 2020 06:17) (98% - 100%)    PHYSICAL EXAM:    general - AAO x 3, weak looking +  HEENT - No Icterus  CVS - RRR  RS - AE B/L  Abd - soft, NT  Ext - Pulses +        LABS:                        8.9    6.03  )-----------( 67       ( 28 Sep 2020 06:25 )             28.6     09-28    141  |  112<H>  |  8   ----------------------------<  108<H>  4.2   |  22  |  0.58    Ca    7.4<L>      28 Sep 2020 06:25  Phos  2.4     09-28  Mg     2.3     09-27            Culture - Urine (collected 20 Sep 2020 12:08)  Source: .Urine Clean Catch (Midstream)  Final Report (21 Sep 2020 07:27):    No growth        RADIOLOGY & ADDITIONAL STUDIES:

## 2020-09-28 NOTE — DISCHARGE NOTE PROVIDER - PROVIDER TOKENS
PROVIDER:[TOKEN:[[id number]],FOLLOWUP:[1 month]],PROVIDER:[TOKEN:[[id number]],FOLLOWUP:[2 weeks]]

## 2020-09-28 NOTE — PROGRESS NOTE ADULT - SUBJECTIVE AND OBJECTIVE BOX
Pt stable - feeling better  No bleeding; no diarrhea      MEDICATIONS  (STANDING):  amLODIPine   Tablet 5 milliGRAM(s) Oral daily  bisacodyl 10 milliGRAM(s) Oral at bedtime  carvedilol 25 milliGRAM(s) Oral every 12 hours  dextrose 5% + sodium chloride 0.45%. 1000 milliLiter(s) (75 mL/Hr) IV Continuous <Continuous>  ergocalciferol 50395 Unit(s) Oral <User Schedule>  folic acid 1 milliGRAM(s) Oral daily  lisinopril 20 milliGRAM(s) Oral daily  pantoprazole    Tablet 40 milliGRAM(s) Oral every 12 hours  sucralfate suspension 1 Gram(s) Oral every 6 hours  zinc oxide 20% Ointment 1 Application(s) Topical two times a day    MEDICATIONS  (PRN):  acetaminophen   Tablet .. 650 milliGRAM(s) Oral every 6 hours PRN Mild Pain (1 - 3)  melatonin 3 milliGRAM(s) Oral at bedtime PRN Insomnia      Allergies    No Known Allergies    Intolerances        Vital Signs Last 24 Hrs  T(C): 37.2 (28 Sep 2020 17:49), Max: 37.2 (28 Sep 2020 17:49)  T(F): 98.9 (28 Sep 2020 17:49), Max: 98.9 (28 Sep 2020 17:49)  HR: 94 (28 Sep 2020 17:49) (67 - 94)  BP: 152/75 (28 Sep 2020 17:49) (140/60 - 155/62)  BP(mean): --  RR: 18 (28 Sep 2020 17:49) (18 - 18)  SpO2: 99% (28 Sep 2020 17:49) (95% - 100%)    PHYSICAL EXAM:  General: NAD.  CVS: S1, S2  Chest: air entry bilaterally present  Abd: BS present, soft, non-tender      LABS:                        8.9    6.03  )-----------( 67       ( 28 Sep 2020 06:25 )             28.6     09-28    141  |  112<H>  |  8   ----------------------------<  108<H>  4.2   |  22  |  0.58    Ca    7.4<L>      28 Sep 2020 06:25  Phos  2.4     09-28  Mg     2.3     09-27        Surgical Pathology Report (09.25.20 @ 13:30)    Surgical Pathology Report:   ACCESSION No:  [id number]    PETROS WELLS                         1        Surgical Final Report          Final Diagnosis    1. Sigmoid colon; anastomosis site; biopsy:  - Colonic mucosa with reactive changes  - Negative for dysplasia or neoplasia    2. Rectum; biopsy:  - Colorectal mucosa with focal cryptitis, crypt abscess,  mucosal fibrosis and reactive atypia  - Detached fragment of fibrino inflammatory exudate  suggestive of ulceration    Verified by: Jyotsna Monte MD  (ElectronicSignature)  Reported on: 09/28/20 12:05 EDT, [place] [address]  Phone: [phone]   Fax: [phone]  _________________________________________________________________    will discuss path report with pathology in AM  +rectal ulcer - colon otherwise negative  diet as tolerated

## 2020-09-28 NOTE — DISCHARGE NOTE PROVIDER - CARE PROVIDER_API CALL
Uriel Walton  INTERNAL MEDICINE  [address] [facility] [address], [address]  [place] [address]  Phone: [phone]  Fax: [phone]  Follow Up Time: 1 month    Jarrett Montgomery  HEMATOLOGY  [address] [facility], [address]  [place] [address]  Phone: [phone]  Fax: [phone]  Follow Up Time: 2 weeks

## 2020-09-28 NOTE — DISCHARGE NOTE PROVIDER - NSDCCPCAREPLAN_GEN_ALL_CORE_FT
PRINCIPAL DISCHARGE DIAGNOSIS  Diagnosis: Anemia, unspecified type  Assessment and Plan of Treatment:

## 2020-09-28 NOTE — PROGRESS NOTE ADULT - SUBJECTIVE AND OBJECTIVE BOX
Patient is a 80y old  Male who presents with a chief complaint of GI Bleeding, Abdominal pain (22 Sep 2020 09:34)      INTERVAL HPI/OVERNIGHT EVENTS:    No complaints; states he's OK w/ rehab, but wants me to talk w/ his daughter first. He states the diarrhea is nearly resolved; had a somewhat firm BM today    REVIEW OF SYSTEMS:   Remaining ROS negative    MEDICATIONS  (STANDING):  amLODIPine   Tablet 5 milliGRAM(s) Oral daily  bisacodyl 10 milliGRAM(s) Oral at bedtime  carvedilol 25 milliGRAM(s) Oral every 12 hours  dextrose 5% + sodium chloride 0.45%. 1000 milliLiter(s) (75 mL/Hr) IV Continuous <Continuous>  ergocalciferol 76428 Unit(s) Oral <User Schedule>  folic acid 1 milliGRAM(s) Oral daily  lisinopril 10 milliGRAM(s) Oral daily  metroNIDAZOLE  IVPB      metroNIDAZOLE  IVPB 500 milliGRAM(s) IV Intermittent every 8 hours  pantoprazole    Tablet 40 milliGRAM(s) Oral every 12 hours  sucralfate suspension 1 Gram(s) Oral every 6 hours  zinc oxide 20% Ointment 1 Application(s) Topical two times a day    MEDICATIONS  (PRN):  acetaminophen   Tablet .. 650 milliGRAM(s) Oral every 6 hours PRN Mild Pain (1 - 3)  melatonin 3 milliGRAM(s) Oral at bedtime PRN Insomnia      Allergies    No Known Allergies    Intolerances        Vital Signs Last 24 Hrs  T(C): 36.8 (28 Sep 2020 11:33), Max: 37 (28 Sep 2020 00:30)  T(F): 98.2 (28 Sep 2020 11:33), Max: 98.6 (28 Sep 2020 00:30)  HR: 72 (28 Sep 2020 11:33) (67 - 89)  BP: 155/62 (28 Sep 2020 11:33) (133/56 - 155/62)  BP(mean): --  RR: 18 (28 Sep 2020 11:33) (18 - 18)  SpO2: 95% (28 Sep 2020 11:33) (95% - 100%)    PHYSICAL EXAM:  GENERAL: NAD  HEAD:  Atraumatic, Normocephalic  EYES: EOMI, PERRLA, conjunctiva and sclera clear  ENT: O/P Clear  NECK: Supple, No JVD  NERVOUS SYSTEM:  No focal deficits  CHEST/LUNG: Clear to percussion bilaterally; No rales, rhonchi, wheezing  HEART: Regular rate and rhythm; No murmurs, rubs, or gallops  ABDOMEN: Soft, Nontender, Nondistended; Bowel sounds present  EXTREMITIES:  2+ Peripheral Pulses, No clubbing, cyanosis, or edema  SKIN: No rashes or lesions    LABS:                        8.9    6.03  )-----------( 67       ( 28 Sep 2020 06:25 )             28.6     09-28    141  |  112<H>  |  8   ----------------------------<  108<H>  4.2   |  22  |  0.58    Ca    7.4<L>      28 Sep 2020 06:25  Phos  2.4     09-28  Mg     2.3     09-27          CAPILLARY BLOOD GLUCOSE          RADIOLOGY & ADDITIONAL TESTS:    Imaging Personally Reviewed:  [ ] YES  [ ] NO    Consultant(s) Notes Reviewed:  [ ] YES  [ ] NO    Care Discussed with Consultants/Other Providers [ ] YES  [ ] NO

## 2020-09-29 NOTE — PROGRESS NOTE ADULT - PROBLEM SELECTOR PLAN 1
- BM results, d/w Dr Latham from pathology, no obviously Maligancy, MDS, continue b12 supplementation as outpatient  - will Try IV decadron to see if patient has some chronic ITP componenet  - watch for Blood Sugars  - Supportive care  - d/w daughter - about diagnosis and need for follow up as outpatient, daughter has our contact information and will get in touch with us as outpatient

## 2020-09-29 NOTE — PROGRESS NOTE ADULT - SUBJECTIVE AND OBJECTIVE BOX
Pt comfortable - no complaints  Colonoscopy revealed rectal ulcer    MEDICATIONS  (STANDING):  amLODIPine   Tablet 5 milliGRAM(s) Oral daily  bisacodyl 10 milliGRAM(s) Oral at bedtime  carvedilol 25 milliGRAM(s) Oral every 12 hours  ergocalciferol 48148 Unit(s) Oral <User Schedule>  folic acid 1 milliGRAM(s) Oral daily  lisinopril 20 milliGRAM(s) Oral daily  pantoprazole    Tablet 40 milliGRAM(s) Oral every 12 hours  sucralfate suspension 1 Gram(s) Oral every 6 hours  zinc oxide 20% Ointment 1 Application(s) Topical two times a day    MEDICATIONS  (PRN):  acetaminophen   Tablet .. 650 milliGRAM(s) Oral every 6 hours PRN Mild Pain (1 - 3)  melatonin 3 milliGRAM(s) Oral at bedtime PRN Insomnia      Allergies    No Known Allergies    Intolerances        Vital Signs Last 24 Hrs  T(C): 36.5 (29 Sep 2020 05:15), Max: 37.2 (28 Sep 2020 17:49)  T(F): 97.7 (29 Sep 2020 05:15), Max: 98.9 (28 Sep 2020 17:49)  HR: 66 (29 Sep 2020 05:15) (66 - 94)  BP: 142/68 (29 Sep 2020 05:15) (142/68 - 173/82)  BP(mean): --  RR: 18 (29 Sep 2020 05:15) (18 - 18)  SpO2: 100% (29 Sep 2020 05:15) (95% - 100%)    PHYSICAL EXAM:  General: NAD.  CVS: S1, S2  Chest: air entry bilaterally present  Abd: BS present, soft, non-tender      LABS:                        8.9    6.03  )-----------( 67       ( 28 Sep 2020 06:25 )             28.6     09-28    141  |  112<H>  |  8   ----------------------------<  108<H>  4.2   |  22  |  0.58    Ca    7.4<L>      28 Sep 2020 06:25  Phos  2.4     09-28      path report noted - call placed to pathology for further discussion about possible etiology of rectal ulcer       Pt comfortable - no complaints  Colonoscopy revealed rectal ulcer    MEDICATIONS  (STANDING):  amLODIPine   Tablet 5 milliGRAM(s) Oral daily  bisacodyl 10 milliGRAM(s) Oral at bedtime  carvedilol 25 milliGRAM(s) Oral every 12 hours  ergocalciferol 90738 Unit(s) Oral <User Schedule>  folic acid 1 milliGRAM(s) Oral daily  lisinopril 20 milliGRAM(s) Oral daily  pantoprazole    Tablet 40 milliGRAM(s) Oral every 12 hours  sucralfate suspension 1 Gram(s) Oral every 6 hours  zinc oxide 20% Ointment 1 Application(s) Topical two times a day    MEDICATIONS  (PRN):  acetaminophen   Tablet .. 650 milliGRAM(s) Oral every 6 hours PRN Mild Pain (1 - 3)  melatonin 3 milliGRAM(s) Oral at bedtime PRN Insomnia      Allergies    No Known Allergies    Intolerances        Vital Signs Last 24 Hrs  T(C): 36.5 (29 Sep 2020 05:15), Max: 37.2 (28 Sep 2020 17:49)  T(F): 97.7 (29 Sep 2020 05:15), Max: 98.9 (28 Sep 2020 17:49)  HR: 66 (29 Sep 2020 05:15) (66 - 94)  BP: 142/68 (29 Sep 2020 05:15) (142/68 - 173/82)  BP(mean): --  RR: 18 (29 Sep 2020 05:15) (18 - 18)  SpO2: 100% (29 Sep 2020 05:15) (95% - 100%)    PHYSICAL EXAM:  General: NAD.  CVS: S1, S2  Chest: air entry bilaterally present  Abd: BS present, soft, non-tender      LABS:                        8.9    6.03  )-----------( 67       ( 28 Sep 2020 06:25 )             28.6     09-28    141  |  112<H>  |  8   ----------------------------<  108<H>  4.2   |  22  |  0.58    Ca    7.4<L>      28 Sep 2020 06:25  Phos  2.4     09-28      path report noted - call placed to pathology for further discussion about possible etiology of rectal ulcer - spoke with pathology - need to r/o IBD vs ischemic ulcer  will start cortenemas qhs x 1 week

## 2020-09-29 NOTE — PROGRESS NOTE ADULT - ASSESSMENT
79 yo M w/ PMH HTN, colon cancer BIBA due to weakness. Daughter called EMS d/t 2 days of watery, non-bloody diarrhea, worsening weakness. Questionable hx of fall this AM? Pt is not on AC. Pt endorses lower abd pain, but no nausea vomiting

## 2020-09-29 NOTE — PROGRESS NOTE ADULT - SUBJECTIVE AND OBJECTIVE BOX
lying in bed, BM results no obviously malignancy    Vital Signs Last 24 Hrs  T(C): 36.5 (29 Sep 2020 05:15), Max: 37.2 (28 Sep 2020 17:49)  T(F): 97.7 (29 Sep 2020 05:15), Max: 98.9 (28 Sep 2020 17:49)  HR: 66 (29 Sep 2020 05:15) (66 - 94)  BP: 142/68 (29 Sep 2020 05:15) (142/68 - 173/82)  BP(mean): --  RR: 18 (29 Sep 2020 05:15) (18 - 18)  SpO2: 100% (29 Sep 2020 05:15) (95% - 100%)    PHYSICAL EXAM:    general - AAO x 3  HEENT - No Icterus  CVS - RRR  RS - AE B/L  Abd - soft, NT  Ext - Pulses +        LABS:                        8.9    6.03  )-----------( 67       ( 28 Sep 2020 06:25 )             28.6     09-28    141  |  112<H>  |  8   ----------------------------<  108<H>  4.2   |  22  |  0.58    Ca    7.4<L>      28 Sep 2020 06:25  Phos  2.4     09-28            Culture - Urine (collected 20 Sep 2020 12:08)  Source: .Urine Clean Catch (Midstream)  Final Report (21 Sep 2020 07:27):    No growth        RADIOLOGY & ADDITIONAL STUDIES:

## 2020-09-29 NOTE — PROGRESS NOTE ADULT - PROBLEM SELECTOR PLAN 1
GI consulted (Anton)  EGD done 9/17 - showed gastric ulcer; Bx benign  continue with Protonix BID  Colonoscopy done 9/25 - showed ticosis and rectal ulcer  Bx showed no neoplasm  colitis seen on CT scan  1 week of cortisol enemas added by GI

## 2020-09-29 NOTE — PROGRESS NOTE ADULT - PROBLEM SELECTOR PLAN 5
Heme consulted (Ulises)  BM Bx done 9/18; results pending... (prelim shows no MDS)  Low risk of bleeding at this point; OK to f/u as outpatient  short course of Decadron added by Dr. Montgomery

## 2020-09-29 NOTE — PROGRESS NOTE ADULT - SUBJECTIVE AND OBJECTIVE BOX
Patient is a 80y old  Male who presents with a chief complaint of GI Bleeding, Abdominal pain (22 Sep 2020 09:34)      INTERVAL HPI/OVERNIGHT EVENTS:   Feels well; no new issues.    REVIEW OF SYSTEMS:   Remaining ROS negative    MEDICATIONS  (STANDING):  amLODIPine   Tablet 5 milliGRAM(s) Oral daily  bisacodyl 10 milliGRAM(s) Oral at bedtime  carvedilol 25 milliGRAM(s) Oral every 12 hours  ergocalciferol 45682 Unit(s) Oral <User Schedule>  folic acid 1 milliGRAM(s) Oral daily  hydrocortisone Enema 100 milliGRAM(s) Rectal at bedtime  lisinopril 20 milliGRAM(s) Oral daily  pantoprazole    Tablet 40 milliGRAM(s) Oral every 12 hours  sucralfate suspension 1 Gram(s) Oral every 6 hours  zinc oxide 20% Ointment 1 Application(s) Topical two times a day    MEDICATIONS  (PRN):  acetaminophen   Tablet .. 650 milliGRAM(s) Oral every 6 hours PRN Mild Pain (1 - 3)  melatonin 3 milliGRAM(s) Oral at bedtime PRN Insomnia      Allergies    No Known Allergies    Intolerances        Vital Signs Last 24 Hrs  T(C): 36.7 (29 Sep 2020 11:36), Max: 37.2 (28 Sep 2020 17:49)  T(F): 98 (29 Sep 2020 11:36), Max: 98.9 (28 Sep 2020 17:49)  HR: 81 (29 Sep 2020 11:36) (66 - 94)  BP: 146/74 (29 Sep 2020 11:36) (142/68 - 173/82)  BP(mean): --  RR: 18 (29 Sep 2020 11:36) (18 - 18)  SpO2: 99% (29 Sep 2020 11:36) (99% - 100%)    PHYSICAL EXAM:  GENERAL: NAD  HEAD:  Atraumatic, Normocephalic  EYES: EOMI, PERRLA, conjunctiva and sclera clear  ENT: O/P Clear  NECK: Supple, No JVD  NERVOUS SYSTEM:  No focal deficits  CHEST/LUNG: Clear to percussion bilaterally; No rales, rhonchi, wheezing  HEART: Regular rate and rhythm; No murmurs, rubs, or gallops  ABDOMEN: Soft, Nontender, Nondistended; Bowel sounds present  EXTREMITIES:  2+ Peripheral Pulses, No clubbing, cyanosis, or edema  SKIN: No rashes or lesions    LABS:                        8.9    6.03  )-----------( 67       ( 28 Sep 2020 06:25 )             28.6     09-28    141  |  112<H>  |  8   ----------------------------<  108<H>  4.2   |  22  |  0.58    Ca    7.4<L>      28 Sep 2020 06:25  Phos  2.4     09-28          CAPILLARY BLOOD GLUCOSE      POCT Blood Glucose.: 119 mg/dL (29 Sep 2020 14:08)      RADIOLOGY & ADDITIONAL TESTS:    Imaging Personally Reviewed:  [ ] YES  [ ] NO    Consultant(s) Notes Reviewed:  [ ] YES  [ ] NO    Care Discussed with Consultants/Other Providers [ ] YES  [ ] NO

## 2020-09-30 NOTE — PROGRESS NOTE ADULT - PROBLEM SELECTOR PROBLEM 6
B12 deficiency
Folate deficiency
B12 deficiency

## 2020-09-30 NOTE — PROGRESS NOTE ADULT - PROBLEM SELECTOR PLAN 2
Due to GI Bleeding  s/p 3 units of blood initially on 9/16/2020  transfused 2 more units on 9/18/2020  transfused 2 more units on 9/21/2020  Continue to follow; H/H has stabilized nicely  9/30/2020  hgb stable

## 2020-09-30 NOTE — CHART NOTE - NSCHARTNOTEFT_GEN_A_CORE
Pt with PMH HTN, colon CA, prostate CA, fibrosarcoma, presented with weakness and diarrhea, found with GI bleed, anemia, HTN, and pancytopenia, ANNEMARIE (now resolved), s/p bone marrow biopsy 9/18 (no neoplasm), s/p colonoscopy 9/25. Pt states diarrhea resolved; last BM this morning (09/30). Pt now pending discharge to rehab.    Factors impacting intake: [x] none [ ] nausea  [ ] vomiting [ ] diarrhea [ ] constipation  [ ]chewing problems [ ] swallowing issues  [ ] other:     Diet Prescription: Diet, DASH/TLC:   Sodium & Cholesterol Restricted (09-27-20 @ 09:23)    Intake: Pt states good appetite and PO intake. Observed 100% of breakfast tray consumed at bedside at time of visit. Per flow sheets PO intake % since last assessment (09/23)    Current Weight: (09/30) 77.2kg (09/13) 77.1kg; indicates 0.1kg weight gain  % Weight Change: Weights stable    Edema: 2+ bilateral legs and arms; 2+ scrotum per flow sheets    Pertinent Medications: MEDICATIONS  (STANDING):  amLODIPine   Tablet 5 milliGRAM(s) Oral daily  bisacodyl 10 milliGRAM(s) Oral at bedtime  carvedilol 25 milliGRAM(s) Oral every 12 hours  dexAMETHasone  IVPB 40 milliGRAM(s) IV Intermittent daily  ergocalciferol 81572 Unit(s) Oral <User Schedule>  folic acid 1 milliGRAM(s) Oral daily  hydrocortisone Enema 100 milliGRAM(s) Rectal at bedtime  lisinopril 20 milliGRAM(s) Oral daily  pantoprazole    Tablet 40 milliGRAM(s) Oral every 12 hours  sucralfate suspension 1 Gram(s) Oral every 6 hours  zinc oxide 20% Ointment 1 Application(s) Topical two times a day    MEDICATIONS  (PRN):  acetaminophen   Tablet .. 650 milliGRAM(s) Oral every 6 hours PRN Mild Pain (1 - 3)  melatonin 3 milliGRAM(s) Oral at bedtime PRN Insomnia    Pertinent Labs: 09-28 Na141 mmol/L Glu 108 mg/dL<H> K+ 4.2 mmol/L Cr  0.58 mg/dL BUN 8 mg/dL 09-28 Phos 2.4 mg/dL<L>    Skin: Pressure injuries: 1. sacrum- stage I 2. right heel- stage I    Estimated Needs:   [x] no change since previous assessment: 09/23/2020  [ ] recalculated:     Previous Nutrition Diagnosis:   [x] Increased Nutrient Needs Protein/energy.     Etiology increased physiological demands for wound healing.     Signs/Symptoms stage I sacral pressure injury.     Goal/Expected Outcome Pt to consume >75% meals/supplements. (unmet)      Nutrition Diagnosis is [x] ongoing  [ ] resolved [ ] not applicable     New Nutrition Diagnosis: [ ] not applicable      Interventions:   Recommend:  [x] Continue current diet: DASH/TLC  [ ] Change Diet To:  [ ] Nutrition Supplement  [ ] Nutrition Support  [ ] Other:     Monitoring and Evaluation:   [x] PO intake [ x ] Tolerance to diet prescription [ x ] weights [ x ] labs[ x ] follow up per protocol  [ ] other:

## 2020-09-30 NOTE — PROGRESS NOTE ADULT - SUBJECTIVE AND OBJECTIVE BOX
Patient is a 80y old  Male who presents with a chief complaint of GI Bleeding, Abdominal pain (22 Sep 2020 09:34)      INTERVAL HPI/OVERNIGHT EVENTS:   Feels well; no new issues.      MEDICATIONS  (STANDING):  amLODIPine   Tablet 5 milliGRAM(s) Oral daily  bisacodyl 10 milliGRAM(s) Oral at bedtime  carvedilol 25 milliGRAM(s) Oral every 12 hours  dexAMETHasone  IVPB 40 milliGRAM(s) IV Intermittent daily  ergocalciferol 08988 Unit(s) Oral <User Schedule>  folic acid 1 milliGRAM(s) Oral daily  hydrocortisone Enema 100 milliGRAM(s) Rectal at bedtime  lisinopril 20 milliGRAM(s) Oral daily  pantoprazole    Tablet 40 milliGRAM(s) Oral every 12 hours  sucralfate suspension 1 Gram(s) Oral every 6 hours  zinc oxide 20% Ointment 1 Application(s) Topical two times a day    MEDICATIONS  (PRN):  acetaminophen   Tablet .. 650 milliGRAM(s) Oral every 6 hours PRN Mild Pain (1 - 3)  melatonin 3 milliGRAM(s) Oral at bedtime PRN Insomnia    Allergies    No Known Allergies    Intolerances    Vital Signs Last 24 Hrs  T(C): 36.7 (30 Sep 2020 05:53), Max: 36.7 (29 Sep 2020 11:36)  T(F): 98.1 (30 Sep 2020 05:53), Max: 98.1 (30 Sep 2020 05:53)  HR: 80 (30 Sep 2020 05:53) (80 - 86)  BP: 163/77 (30 Sep 2020 05:53) (146/74 - 163/77)  BP(mean): --  RR: 20 (30 Sep 2020 05:53) (18 - 20)  SpO2: 99% (30 Sep 2020 05:53) (95% - 99%)  PHYSICAL EXAM:  GENERAL: NAD  HEAD:  Atraumatic, Normocephalic  EYES: EOMI, PERRLA, conjunctiva and sclera clear  ENT: O/P Clear  NECK: Supple, No JVD  NERVOUS SYSTEM:  No focal deficits  CHEST/LUNG: Clear to percussion bilaterally; No rales, rhonchi, wheezing  HEART: Regular rate and rhythm; No murmurs, rubs, or gallops  ABDOMEN: Soft, Nontender, Nondistended; Bowel sounds present  EXTREMITIES:  2+ Peripheral Pulses, No clubbing, cyanosis, or edema  SKIN: No rashes or lesions    LABS:                 09-30    141  |  111<H>  |  21  ----------------------------<  196<H>  4.1   |  23  |  0.75    Ca    7.9<L>      30 Sep 2020 10:00  Phos  3.1     09-30  Mg     2.2     09-30          CAPILLARY BLOOD GLUCOSE        RADIOLOGY & ADDITIONAL TESTS:    Imaging Personally Reviewed:  [ ] YES  [ ] NO    Consultant(s) Notes Reviewed:  [ ] YES  [ ] NO    Care Discussed with Consultants/Other Providers [ ] YES  [ ] NO

## 2020-09-30 NOTE — PROGRESS NOTE ADULT - PROBLEM SELECTOR PROBLEM 1
Gastrointestinal bleeding
Pancytopenia
Essential hypertension
Gastrointestinal bleeding
Pancytopenia
Thrombocytopenia
Essential hypertension
Gastrointestinal bleeding

## 2020-09-30 NOTE — PROGRESS NOTE ADULT - PROBLEM SELECTOR PLAN 1
- BM non diagnostic d/w pathology  - emperic steroids for thrombocytopenia 40mg decadron for x 4days  - watch blood counts, watch Blood sugar  - d/w patients daughter, who knows to f/u as outpatient

## 2020-09-30 NOTE — PROGRESS NOTE ADULT - PROBLEM SELECTOR PLAN 3
supplemented
Normally takes Coreg 25mg BID, Lisinopril 20mg, Nifedipine-XR 60mg  Continue Coreg; Nifedipine changed to Amlodipine  Can restart low dose of Lisinopril at this point -- 10mg for now
Normally takes Coreg 25mg BID, Lisinopril 20mg, Nifedipine-XR 60mg  Continue Coreg; Nifedipine changed to Amlodipine ( on 9/30/2020 will increase to 10mg)  and  continue with lisinopril 20 mg  ( can increase ACEI as needed)     was restarted on t low dose of Lisinopril at this point -- 10mg by my colleague
resolved
resolved
will be replaced
resolved
will be replaced
resolved
Normally takes Coreg 25mg BID, Lisinopril 20mg, Nifedipine-XR 60mg  Continue Coreg; Nifedipine changed to Amlodipine

## 2020-09-30 NOTE — PROGRESS NOTE ADULT - PROBLEM SELECTOR PLAN 6
started supplement
was started supplement
started supplement
was started supplement
started supplement
was started on supplement
was started supplement
started supplement

## 2020-09-30 NOTE — PROGRESS NOTE ADULT - PROVIDER SPECIALTY LIST ADULT
Gastroenterology
Heme/Onc
Hospitalist
Surgery
Hospitalist
Heme/Onc
Hospitalist

## 2020-09-30 NOTE — PROGRESS NOTE ADULT - PROBLEM SELECTOR PROBLEM 5
Thrombocytopenia
B12 deficiency
Thrombocytopenia

## 2020-09-30 NOTE — PROGRESS NOTE ADULT - PROBLEM SELECTOR PLAN 7
started supplement
was started supplement
started supplement
was started replacement
started supplement
was started on supplement
was started supplement
started supplement

## 2020-09-30 NOTE — PROGRESS NOTE ADULT - SUBJECTIVE AND OBJECTIVE BOX
Pt stable - feeling better  Spoke with pathology - further studies being done on rectal ulcer - pt started on cortenemas  H/H much improved      MEDICATIONS  (STANDING):  amLODIPine   Tablet 5 milliGRAM(s) Oral once  bisacodyl 10 milliGRAM(s) Oral at bedtime  carvedilol 25 milliGRAM(s) Oral every 12 hours  dexAMETHasone  IVPB 40 milliGRAM(s) IV Intermittent daily  ergocalciferol 71920 Unit(s) Oral <User Schedule>  folic acid 1 milliGRAM(s) Oral daily  hydrocortisone Enema 100 milliGRAM(s) Rectal at bedtime  lisinopril 20 milliGRAM(s) Oral daily  pantoprazole    Tablet 40 milliGRAM(s) Oral every 12 hours  sucralfate suspension 1 Gram(s) Oral every 6 hours  zinc oxide 20% Ointment 1 Application(s) Topical two times a day    MEDICATIONS  (PRN):  acetaminophen   Tablet .. 650 milliGRAM(s) Oral every 6 hours PRN Mild Pain (1 - 3)  melatonin 3 milliGRAM(s) Oral at bedtime PRN Insomnia      Allergies    No Known Allergies    Intolerances        Vital Signs Last 24 Hrs  T(C): 36.8 (30 Sep 2020 11:24), Max: 36.8 (30 Sep 2020 11:24)  T(F): 98.3 (30 Sep 2020 11:24), Max: 98.3 (30 Sep 2020 11:24)  HR: 80 (30 Sep 2020 11:24) (80 - 86)  BP: 144/69 (30 Sep 2020 11:24) (144/69 - 163/77)  BP(mean): --  RR: 18 (30 Sep 2020 11:24) (18 - 20)  SpO2: 99% (30 Sep 2020 11:24) (95% - 99%)    PHYSICAL EXAM:  General: NAD.  CVS: S1, S2  Chest: air entry bilaterally present  Abd: BS present, soft, non-tender      LABS:                        10.7   6.62  )-----------( 84       ( 30 Sep 2020 10:00 )             32.5     09-30    141  |  111<H>  |  21  ----------------------------<  196<H>  4.1   |  23  |  0.75    Ca    7.9<L>      30 Sep 2020 10:00  Phos  3.1     09-30  Mg     2.2     09-30        continue present meds  d/c planning

## 2020-09-30 NOTE — PROGRESS NOTE ADULT - PROBLEM SELECTOR PLAN 5
Heme consulted (Ulises)  BM Bx done 9/18; results pending... (prelim shows no MDS)  Low risk of bleeding at this point; OK to f/u as outpatient  short course of Decadron added by Dr. Henley  platelet count stable >60K since 9/25/2020 will d/c with po decadron for 2 more days and patient to follow up with dr. henley in office

## 2020-09-30 NOTE — PROGRESS NOTE ADULT - PROBLEM SELECTOR PLAN 9
was present on admission  has since resolved
was present on admission  has since resolved
on admission with some ischemic changes in lateral lead ing of hgb o 6.4 and mildly elevated troponin presumed to be due to stress induced ischemia and troponin leak secondary to low lhgb .    will repeat ekg and get one more set or tropinin.    no ASA in ligt of GI Bleed:-   plans for colonoscopy in am , s/p EGD earlier in admission
was present on admission  has since resolved

## 2020-09-30 NOTE — PROGRESS NOTE ADULT - PROBLEM SELECTOR PLAN 4
supplemented
will be replaced
will be replaced
Hem consult appreciated.                 per previous provider conversation with daughter  longstanding never had biopsy on 9/18/2020 s/p BM biopsy follow up results  9/22/2020 still awaiting bm biopsy results  9/23/2020 previus provider called pathologist dr. Twyla Latham at [facility] and sent off for more staining states the marrow looks hypercellular similar to peripheral blood smear with monocytes but she has to wait to definitively for results could be MDS or CMML
resolved
supplemented
will be replaced
will be replaced   will check vit d levels
will be replaced

## 2020-09-30 NOTE — PROGRESS NOTE ADULT - PROBLEM SELECTOR PROBLEM 2
Essential hypertension
Anemia due to acute blood loss
Gastrointestinal bleeding
Anemia due to acute blood loss

## 2020-09-30 NOTE — PROGRESS NOTE ADULT - PROBLEM SELECTOR PROBLEM 8
Vitamin D deficiency
Vitamin D deficiency
ANNEMARIE (acute kidney injury)
Vitamin D deficiency
Acute renal failure, unspecified acute renal failure type
Vitamin D deficiency

## 2020-09-30 NOTE — DISCHARGE NOTE NURSING/CASE MANAGEMENT/SOCIAL WORK - PATIENT PORTAL LINK FT
You can access the FollowMyHealth Patient Portal offered by [facility] by registering at the following website: [email or web address]. By joining [facility]’s FollowMyHealth portal, you will also be able to view your health information using other applications (apps) compatible with our system.

## 2020-09-30 NOTE — PROGRESS NOTE ADULT - PROBLEM SELECTOR PROBLEM 3
Hypokalemia
Essential hypertension
Hypokalemia
Essential hypertension

## 2020-09-30 NOTE — PROGRESS NOTE ADULT - PROBLEM SELECTOR PROBLEM 7
Folate deficiency
Vitamin D deficiency
Folate deficiency

## 2020-09-30 NOTE — PROGRESS NOTE ADULT - PROBLEM SELECTOR PROBLEM 4
Thrombocytopenia
Hypokalemia
Hypophosphatemia
Hypokalemia

## 2020-09-30 NOTE — PROGRESS NOTE ADULT - SUBJECTIVE AND OBJECTIVE BOX
feels well    Vital Signs Last 24 Hrs  T(C): 36.7 (30 Sep 2020 05:53), Max: 36.7 (29 Sep 2020 11:36)  T(F): 98.1 (30 Sep 2020 05:53), Max: 98.1 (30 Sep 2020 05:53)  HR: 80 (30 Sep 2020 05:53) (80 - 86)  BP: 163/77 (30 Sep 2020 05:53) (146/74 - 163/77)  BP(mean): --  RR: 20 (30 Sep 2020 05:53) (18 - 20)  SpO2: 99% (30 Sep 2020 05:53) (95% - 99%)    PHYSICAL EXAM:    general - AAO x 3  HEENT - No Icterus  CVS - RRR  RS - AE B/L  Abd - soft, NT  Ext - Pulses +            Culture - Urine (collected 20 Sep 2020 12:08)  Source: .Urine Clean Catch (Midstream)  Final Report (21 Sep 2020 07:27):    No growth        RADIOLOGY & ADDITIONAL STUDIES:

## 2020-09-30 NOTE — PROGRESS NOTE ADULT - ATTENDING COMMENTS
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    Disp: Rehab on today

## 2020-09-30 NOTE — PROGRESS NOTE ADULT - PROBLEM SELECTOR PROBLEM 9
ANNEMARIE (acute kidney injury)
Abnormal EKG
ANNEMARIE (acute kidney injury)
ANNEMARIE (acute kidney injury)

## 2020-10-12 NOTE — ED ADULT TRIAGE NOTE - CHIEF COMPLAINT QUOTE
pt brought in by ems from [facility], sent in for abnormal labs, platelet count is low. denies pain, fevers, or other symptoms. reports being in his usual state of health.

## 2020-10-13 PROBLEM — I10 ESSENTIAL (PRIMARY) HYPERTENSION: Chronic | Status: ACTIVE | Noted: 2020-01-01

## 2020-10-13 NOTE — H&P ADULT - PROBLEM SELECTOR PLAN 4
patient with hgb of 8.0 recently had GIB from ulcers, may also have bone marrow deficit from underlying malignancy  continue pantoprazole, folate, b12, sucralfate,

## 2020-10-13 NOTE — ED PROVIDER NOTE - PROGRESS NOTE DETAILS
Cara Trotter, resident MD: called nursing supervisor at [facility] who is unsure of pt's baseline mental status and is unaware whether the lab values are new or chronic but was told that pt's family had wanted him to be evaluated in the hospital. will admit for further evaluation of leukocytosis and thrombocytopenia.

## 2020-10-13 NOTE — ED PROVIDER NOTE - OBJECTIVE STATEMENT
79yo man PMH HTN, UC, PUD was sent from [facility] for thrombocytopenia to 38. Pt states he does not know why he was sent to the hospital and is unsure why he is in a living facility and is a poor historian who denies any complaints at this time.

## 2020-10-13 NOTE — ED ADULT NURSE NOTE - NSIMPLEMENTINTERV_GEN_ALL_ED
Implemented All Fall with Harm Risk Interventions:  [place] to call system. Call bell, personal items and telephone within reach. Instruct patient to call for assistance. Room bathroom lighting operational. Non-slip footwear when patient is off stretcher. Physically safe environment: no spills, clutter or unnecessary equipment. Stretcher in lowest position, wheels locked, appropriate side rails in place. Provide visual cue, wrist band, yellow gown, etc. Monitor gait and stability. Monitor for mental status changes and reorient to person, place, and time. Review medications for side effects contributing to fall risk. Reinforce activity limits and safety measures with patient and family. Provide visual clues: red socks.

## 2020-10-13 NOTE — ED ADULT NURSE NOTE - OBJECTIVE STATEMENT
Receive pt to room 29 on a stretcher. Pt says he feels OK. Pt answers " I don't know" when asked why he is here . Pt is alert and oriented x1. Arrives with 24 G SL in the left forearm. NSR on monitor. vs as above. Pt says he does not know why he is at the rehab. MD is at the bedside. Evaluation in progress.

## 2020-10-13 NOTE — H&P ADULT - PROBLEM SELECTOR PLAN 8
Problem: Discharge planning issues. Plan: Transitions of Care Status:  1.  Name of PCP:  2.  PCP Contacted on Admission: [ ] Y    [ ] N    3.  PCP contacted at Discharge: [ ] Y    [ ] N    [ ] N/A  4.  Post-Discharge Appointment Date and Location:  5.  Summary of Handoff given to PCP:

## 2020-10-13 NOTE — PROGRESS NOTE ADULT - PROBLEM SELECTOR PLAN 5
unclear when last colonoscopy was, denies any bleeding or diarrhea. On hydrocortisone enema, will continue for now and obtain collateral Unclear when last colonoscopy was, but reportedly in the last year. Denies any bleeding or diarrhea. On hydrocortisone enema, will continue for now and obtain collateral. Patient with hgb of 8.0 recently had GIB from ulcers, may also have bone marrow deficit from underlying malignancy. Most likely multifactorial with bone marrow suppression and active blood loss from GI bleed.   - Maintain active type and screen   - GI consult for active GI bleed   - Anemia workup (folate, b12, Iron studies)   - Pantoprazole 40mg BID IV   - Monitor for active bleeding   - continue pantoprazole, folate, b12, sucralfate

## 2020-10-13 NOTE — ED PROVIDER NOTE - CLINICAL SUMMARY MEDICAL DECISION MAKING FREE TEXT BOX
79yo man was sent for evaluation of thrombocytopenia to  and has no complaints on exam. Will repeat blood work and may require admission for further evaluation.

## 2020-10-13 NOTE — PROGRESS NOTE ADULT - PROBLEM SELECTOR PLAN 3
was on lisinopril, carvedilol, nifedipine at home however was switched to amlidipine  will williamue at this time. Was on home lisinopril, carvedilol, nifedipine at home however was switched to amlodipine.   - C/w with amlodipine September colonoscopy was performed, confirmed rectal ulcer, but reportedly in the last year.   - c/w hydrocortisone enema, will continue for rectal ulcer  - Stool studies, C.diff, Stool Ova and stool culture

## 2020-10-13 NOTE — ED PROVIDER NOTE - NS ED ROS FT
General: no fever  Head: no headache  Eyes: no vision change or eye pain  ENT: no nasal discharge/congestion, no sore throat  CV: no chest pain  Resp: no SOB, no cough  GI: no N/V/D, no abdominal pain  : no dysuria  MSK: no joint pain  Skin: no new rash  Neuro: no focal weakness, no change in sensation

## 2020-10-13 NOTE — H&P ADULT - NSHPREVIEWOFSYSTEMS_GEN_ALL_CORE
Review of Systems:  Constitutional: No fever, No weight loss, good appetite/po intake  Head: No headache   Eyes: chronic blurry vision, No diplopia  Neuro: No tremors, muscle weakness   Cardiovascular: No chest pain, No palpitations  Respiratory: No SOB, No cough  GI: No nausea, No vomiting, No diarrhea  : No dysuria, No hematuria  Skin: No rash  MSK: No joint pain   Psych: No depression

## 2020-10-13 NOTE — PROGRESS NOTE ADULT - PROBLEM SELECTOR PLAN 4
patient with hgb of 8.0 recently had GIB from ulcers, may also have bone marrow deficit from underlying malignancy  continue pantoprazole, folate, b12, sucralfate, Patient with hgb of 8.0 recently had GIB from ulcers, may also have bone marrow deficit from underlying malignancy. Most likely multifactorial with bone marrow suppression and active blood loss from GI bleed.   - Maintain active type and screen   - GI consult for active GI bleed   - Anemia workup (folate, b12, Iron studies)   - Pantoprazole 40mg BID IV   - Monitor for active bleeding   - continue pantoprazole, folate, b12, sucralfate, Was on home lisinopril, carvedilol, nifedipine at home however was switched to amlodipine.   - C/w with amlodipine

## 2020-10-13 NOTE — PROVIDER CONTACT NOTE (OTHER) - ACTION/TREATMENT ORDERED:
MD AGGARWAL reported labs showing in the HIE. Able to see labs in the HIE. MD reported will continue to monitor hemoglobin.

## 2020-10-13 NOTE — H&P ADULT - ASSESSMENT
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

## 2020-10-13 NOTE — ED ADULT NURSE REASSESSMENT NOTE - NS ED NURSE REASSESS COMMENT FT1
pressure ulder noted in sacrum un able to stage at this time, bandage clean dry and intact. right heel pressure ulcer also noted.

## 2020-10-13 NOTE — CONSULT NOTE ADULT - SUBJECTIVE AND OBJECTIVE BOX
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     In terms of his recent hospitalization at [facility], pt was found to be anemic (Hgb 6.4) and thrombocytopenic (40), requiring 7 units total pRBC transfusion. His CT A/P 9/16 showed mild colonic wall thickening consistent with mild colitis. Pt underwent EGD and colonoscopy, which revealed hemorrhagic esophagitis with a gastric ulcer, as well as diverticulosis and a rectal ulcer. Biopsy of the rectal ulcer was suggestive of ulceration 2/2 ischemic colitis vs IBD. Pt was started on protonix and hydrocortisone enema which were continued on d/c. Pt underwent BM biopsy for work up of thrombocytopenia which was nondiagnostic. He was discharged on a prednisone taper for the thrombocytopenia.     On this admission, pt found to be hemodynamically stable. His labs were notable for WBC 76.82 (6.62 at time of discharge on 9/30), Plts 35 (down from 84), Hgb 8 (down from 10.7). Pt states that he does not know if he has had persistent gastrointestinal bleeding since the time of discharge. Pt denies abdominal pain, nausea, vomiting, diarrhea, constipation, CP, SOB, lightheadedness, fevers, chills. Per primary team, pt noted to have possible hematochezia today.    Allergies:  No Known Allergies    Home Medications:  amLODIPine 10 mg oral tablet: 1 tab(s) orally once a day (13 Oct 2020 14:48)  bisacodyl 5 mg oral delayed release tablet: 2 tab(s) orally once a day (at bedtime) (13 Oct 2020 14:48)  carvedilol 25 mg oral tablet: 1 tab(s) orally 2 times a day (13 Oct 2020 14:48)  dexamethasone 2 mg oral tablet: 1 tab(s) orally 2 times a day for 3 days (13 Oct 2020 14:48)  dexamethasone 4 mg oral tablet: 1 tab(s) orally 2 times a day for 2 days (13 Oct 2020 14:48)  dexamethasone 6 mg oral tablet: 1 tab(s) orally 2 times a day for 2 days (13 Oct 2020 14:48)  ergocalciferol 50,000 intl units (1.25 mg) oral capsule: 1 cap(s) orally once a week on Tuesday at 9:00AM (13 Oct 2020 14:48)  folic acid 1 mg oral tablet: 1 tab(s) orally once a day (13 Oct 2020 14:48)  HumaLOG KwikPen 100 units/mL injectable solution: Sliding scale based on patient&#x27;s blood glucose level, injectable 3 times a day (before meals) (13 Oct 2020 14:48)  hydrocortisone 100 mg/60 mL rectal suspension: 60 milliliter(s) rectal once a day (at bedtime) (13 Oct 2020 14:48)  lisinopril 20 mg oral tablet: 1 tab(s) orally once a day (13 Oct 2020 14:48)  LPS Critical Care 17 g-100 kcal/30 mL oral liquid: 30 milliliter(s) orally once a day (13 Oct 2020 14:48)  MediHoney 100% topical paste: Apply topically to affected area (sacral region pressure ulcer) 2 times a day (13 Oct 2020 14:48)  pantoprazole 40 mg oral delayed release tablet: 1 tab(s) orally every 12 hours (13 Oct 2020 14:48)  Skin Prep Wipes: Apply topically to affected area once a day  Cleanse area with normal saline and pat dry. Wipe right heel with skin prep wipe and cover with PDP. (13 Oct 2020 14:48)  sodium chloride 0.9% injectable solution: 60 milliliter(s) per hour intravenously every shift for 2 days (13 Oct 2020 14:48)  sucralfate 1 g oral tablet: 1 tab(s) orally 4 times a day (before meals and at bedtime) (13 Oct 2020 14:48)  Tylenol 325 mg oral tablet: 2 tab(s) orally 2 times a day, 30 minutes prior to MediHoney treatment change (13 Oct 2020 14:48)  Vitamin B12 1000 mcg oral tablet: 1 tab(s) orally once a day (13 Oct 2020 14:48)  Vitamin C 500 mg oral tablet: 1 tab(s) orally once a day (13 Oct 2020 14:48)  zinc sulfate 220 mg oral capsule: 1 cap(s) orally once a day for 14 days (13 Oct 2020 14:48)    Hospital Medications:  ascorbic acid 500 milliGRAM(s) Oral daily  cyanocobalamin 1000 MICROGram(s) Oral daily  ergocalciferol 09047 Unit(s) Oral <User Schedule>  folic acid 1 milliGRAM(s) Oral daily  hydrocortisone Enema 100 milliGRAM(s) Rectal at bedtime  influenza   Vaccine 0.5 milliLiter(s) IntraMuscular once  pantoprazole  Injectable 40 milliGRAM(s) IV Push two times a day  sucralfate 1 Gram(s) Oral four times a day  zinc sulfate 220 milliGRAM(s) Oral daily      PMHX/PSHX:  Sarcoma  Sarcoma of prostate  Prostate CA  Colon cancer  HTN (hypertension)  Hypertension  S/P hip replacement  H/O colectomy    Family history:  FH: cancer    Social History: no toxic habits    ROS:   General:  No wt loss, fevers, chills, fatigue  ENT:  No sore throat, pain, dysphagia  CV:  No pain, palpitations, hypo/hypertension  Resp:  No dyspnea, cough, tachypnea   GI:  No pain, No nausea, No vomiting, No diarrhea, No constipation, No weight loss, No fever, No dysphagia. Does not know if has had any rectal bleeding  :  No pain, bleeding, incontinence   Muscle:  No pain, weakness  Neuro:  No weakness, tingling   Psych:  No fatigue, insomnia   Endocrine:  No polyuria, polydipsia   Heme:  +ecchymoses  Skin:  No rash, edema    PHYSICAL EXAM:   Vital Signs:  Vital Signs Last 24 Hrs  T(C): 36.6 (13 Oct 2020 11:44), Max: 36.8 (12 Oct 2020 23:20)  T(F): 97.9 (13 Oct 2020 11:44), Max: 98.2 (12 Oct 2020 23:20)  HR: 72 (13 Oct 2020 11:44) (65 - 82)  BP: 138/65 (13 Oct 2020 11:44) (127/69 - 154/71)  BP(mean): --  RR: 16 (13 Oct 2020 11:44) (16 - 17)  SpO2: 98% (13 Oct 2020 11:44) (97% - 99%)  Daily     Daily     GENERAL: no distress, +pallor  HEENT:  NC/AT, conjunctivae clear and pale, sclera anicteric  CHEST:  Full & symmetric excursion, no increased effort, breath sounds clear  HEART:  Regular rhythm, S1, S2, no murmur/rub/S3/S4  ABDOMEN:  Soft, non-tender, non-distended, normoactive bowel sounds, no masses ,no hepato-splenomegaly, no signs of chronic liver disease. +dark watery stool on exam  EXTEREMITIES:  no cyanosis, clubbing or edema. +partial L foot amputation  SKIN:  No rash/erythema/ecchymoses. +posterior sacral decubitus ulcer  NEURO:  Alert, oriented, no asterixis, no tremor     LABS:                        8.0    76.82 )-----------( 35       ( 13 Oct 2020 01:00 )             25.0     10-13    137  |  103  |  36<H>  ----------------------------<  266<H>  4.3   |  24  |  0.60    Ca    9.3      13 Oct 2020 01:00    TPro  4.6<L>  /  Alb  2.9<L>  /  TBili  0.8  /  DBili  x   /  AST  13  /  ALT  21  /  AlkPhos  42  10-13    LIVER FUNCTIONS - ( 13 Oct 2020 01:00 )  Alb: 2.9 g/dL / Pro: 4.6 g/dL / ALK PHOS: 42 u/L / ALT: 21 u/L / AST: 13 u/L / GGT: x           PT/INR - ( 13 Oct 2020 01:00 )   PT: 15.2 SEC;   INR: 1.35          PTT - ( 13 Oct 2020 01:00 )  PTT:28.9 SEC

## 2020-10-13 NOTE — H&P ADULT - PROBLEM SELECTOR PLAN 5
Need for prophylactic measure. Plan: DVT ppx: SCDS  Diet: DASH  Dispo: pending heme/onc eval  GOC: full code. unclear when last colonoscopy was, denies any bleeding or diarrhea. On hydrocortisone enema, will continue for now and obtain collateral

## 2020-10-13 NOTE — H&P ADULT - NSHPLABSRESULTS_GEN_ALL_CORE
.  LABS:                         8.0    76.82 )-----------( 35       ( 13 Oct 2020 01:00 )             25.0     10-13    137  |  103  |  36<H>  ----------------------------<  266<H>  4.3   |  24  |  0.60    Ca    9.3      13 Oct 2020 01:00    TPro  4.6<L>  /  Alb  2.9<L>  /  TBili  0.8  /  DBili  x   /  AST  13  /  ALT  21  /  AlkPhos  42  10-13    PT/INR - ( 13 Oct 2020 01:00 )   PT: 15.2 SEC;   INR: 1.35          PTT - ( 13 Oct 2020 01:00 )  PTT:28.9 SEC          RADIOLOGY, EKG & ADDITIONAL TESTS: Reviewed. .  LABS:                         8.0    76.82 )-----------( 35       ( 13 Oct 2020 01:00 )             25.0     10-13    137  |  103  |  36<H>  ----------------------------<  266<H>  4.3   |  24  |  0.60    Ca    9.3      13 Oct 2020 01:00    TPro  4.6<L>  /  Alb  2.9<L>  /  TBili  0.8  /  DBili  x   /  AST  13  /  ALT  21  /  AlkPhos  42  10-13    PT/INR - ( 13 Oct 2020 01:00 )   PT: 15.2 SEC;   INR: 1.35          PTT - ( 13 Oct 2020 01:00 )  PTT:28.9 SEC          RADIOLOGY, EKG & ADDITIONAL TESTS: Reviewed.    EKG: normal sinus rhythm with twave changes lateral leads

## 2020-10-13 NOTE — CONSULT NOTE ADULT - ASSESSMENT
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     #GIB - pt with dark watery stool on rectal exam concerning for upper GI bleed. Possibly 2/2 existing stomach ulcer vs gastritis vs esophagitis in setting of recent steroid use. Likely underlying BM disorder may also be contributing to anemia.  - continue with protonix 40 mg IV bid  - keep active T&S, trend CBC bid, transfuse for Hgb<7 or symptomatic anemia  - would appreciate if team able to obtain endoscopy/colonoscopy reports from [facility]    #Thrombocytopenia. Pt w/ new thrombocytopenia since recent admission. S/p BM biopsy at [facility] that was nondiagnostic  - f/u hematology recommendations    #Leukocytosis. Significantly increased from last hospitalization. No current signs/symptoms of infection  - f/u hematology recs  - f/u blood cultures

## 2020-10-13 NOTE — PROVIDER CONTACT NOTE (OTHER) - ACTION/TREATMENT ORDERED:
notified MD, Reported she will order cbc, type and screen, and to take a set of vitals. Will continue to monitor patient.

## 2020-10-13 NOTE — H&P ADULT - PROBLEM SELECTOR PLAN 1
patient with severe leukocytosis 76.82 with family hx of ?myeloma in daughter  patient was on steroids after last admission although usually not this severe  BMB at [facility] non diagnostic: Hypercellular bone marrow biopsy with myeloid hyperplasia, focal erythroid maturation, mild monocytosis, slightly decreased  megakaryocytes (difficult to assess due to fragmentation), and increased iron stores  low suspicion for infection or AI process at [facility]   consult Heme Onc in the AM.  will confirm with heme onc if needs to continue steroids patient with severe leukocytosis 76.82 with family hx of ?myeloma in daughter  patient was on steroids after last admission although usually not this severe  BMB at [facility] non diagnostic: Hypercellular bone marrow biopsy with myeloid hyperplasia, focal erythroid maturation, mild monocytosis, slightly decreased  megakaryocytes (difficult to assess due to fragmentation), and increased iron stores  low suspicion for infection or AI process at this time, no suspcion of leukostasis  consult Heme Onc in the AM.  will confirm with heme onc if needs to continue steroids (per facility med list decadron is listed as taper but last discharge had rec only 4 day course, will need to confirm if needs continuing)

## 2020-10-13 NOTE — H&P ADULT - HISTORY OF PRESENT ILLNESS
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 Patient has noticed increased ski fragility with bruising otherwise in normal state of health. Patient denies sick contacts, travel, unknown substances, changes in medications. Denies headache, changes in vision, lightheadedness, chest pain, shortness of rbeath, nausea, vomiting, diarrhea, constipation, dysuria, fevers chills.   Of note patient has separate MRN [id number]  Patient was admitted to [facility] 9/16 to 9/30 for weakness found to have GIB requiring 7 untis of prbc with EGD done 9/17 - showed gastric ulcer; Bx benign and 9/25 colonscopy showing ticosis and rectal ulcer with no neoplasm and 1 week of coritsol enema. Had home nifedipine changed to amlodipine. Patient also underwent BMB for thrombocytopenia although without clear evidence of MDS was not diagnostic of cancer and discharged to rehab with short course of decadron    In the ED vitals 98.2, 70 HR, /74, RR 16, 97% RA  Labs: WBC 76.82, plt 35, Hgb 8.0

## 2020-10-13 NOTE — PROGRESS NOTE ADULT - PROBLEM SELECTOR PLAN 2
patient with known thrombocytopenia  currently afebrile and not bleeding  hold to transfuse at this time   maintain active type and screen. Thrombocytopenic with multiple hematomas on the R elbow and lower back. Reports active bleeding in his bowel movements of a moderate amount. Had a recent bowel movement while in the ED with blood present.   - Hemoglobin 10.7 at discharge from [facility] VS 9/30 to 8.0 on admission today 10/13. Known to be a chronic issue per family since 2017 however patient did not bring it to his daughters attention until he began having these problems with active bleeding. Thrombocytopenic with multiple hematomas on the R elbow and lower back. Reports active bleeding in his bowel movements of a moderate amount. Had a recent bowel movement while in the ED with blood present.   - Hemoglobin 10.7 at discharge from [facility] VS 9/30 to 8.0 on admission today 10/13.

## 2020-10-13 NOTE — CONSULT NOTE ADULT - ASSESSMENT
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 No blasts seen.   - check TLS labs and DIC panel   - bone marrow reviewed from OSH which was nondiagnostic  - concern for chronic process such as chronic myelomonocytic leukemia vs CML   - send flow cytometry, tubes given to nursing at bedside   - check JAK2 and BCR/ABL   - r/o superimposed infection causing left shift with cultures, CXR, etc   - will consider hydrea if white count continues to rise   - will f/u above workup, consideration for repeat bone marrow biopsy as well depending on workup   - transfuse to keep Hg>7 and plts>10k   - continue supportive care as needed, hydration     Keven Bell, PGY-6  Hematology-Oncology Fellow  [phone] ([place]) [phone] ([facility])

## 2020-10-13 NOTE — PROGRESS NOTE ADULT - ASSESSMENT
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 The differential includes but is not limited to leukemia vs. Steroid induced leukocytosis vs. Leukemoid reaction (steroids. Given lack of fever and localizing symptoms infectious etiology is significantly less likely. 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 for GIB in the setting of thrombocytopenia sent in from rehab for irregular labs and found to have profound leukocytosis and thrombocytopenia with anemia concerning for hematologic malignancy. The differential includes but is not limited to leukemia vs. Steroid induced leukocytosis vs. Leukemoid reaction (steroids. Given lack of fever and localizing symptoms infectious etiology is significantly less likely.

## 2020-10-13 NOTE — H&P ADULT - PROBLEM SELECTOR PLAN 3
was on lisinopril, carvedilol, nifedipine at home however was switched to amlidipine  will williamue at this time.

## 2020-10-13 NOTE — PROGRESS NOTE ADULT - PROBLEM SELECTOR PLAN 1
Severe leukocytosis 76.82 with ? family hx of myeloma in daughter. The patient was on steroids in recent admission to [facility] for thrombocytopenia in the setting of GIB. BMB at  non diagnostic: Hypercellular bone marrow biopsy with myeloid hyperplasia, focal erythroid maturation, mild monocytosis, slightly decreased  megakaryocytes (difficult to assess due to fragmentation), and increased iron stores low suspicion for infection or AI process at this time, no suspicion of leukostasis.    will confirm with heme onc if needs to continue steroids (per facility med list decadron is listed as taper but last discharge had rec only 4 day course, will need to confirm if needs continuing) Severe leukocytosis 76.82 with ? family hx of myeloma in daughter. The patient was on steroids in recent admission to [facility] for thrombocytopenia in the setting of GIB. BMB at  non diagnostic: Hypercellular bone marrow biopsy with myeloid hyperplasia, focal erythroid maturation, mild monocytosis, slightly decreased  megakaryocytes (difficult to assess due to fragmentation), and increased iron stores low suspicion for infection or AI process at this time, no suspicion of leukostasis.  - Hematology consulted for further evaluation   - Tumor lysis labs sent (uric acid, calcium, phosphate)   - Per chart review on decadron confirm with heme onc if needs to continue steroids (per facility med list decadron is listed as taper but last discharge had rec only 4 day course, will need to confirm if needs continuing) Severe leukocytosis 76.82 with ? family hx of myeloma in daughter. The patient was on steroids in recent admission to [facility] for thrombocytopenia in the setting of GIB. BMB at  non diagnostic: Hypercellular bone marrow biopsy with myeloid hyperplasia, focal erythroid maturation, mild monocytosis, slightly decreased  megakaryocytes (difficult to assess due to fragmentation), and increased iron stores low suspicion for infection or AI process at this time, no suspicion of leukostasis.  - Hematology consulted for further evaluation   - Tumor lysis labs sent (uric acid, calcium, phosphate)   - Decardron dose at discharge caused psychosis and was decreased in half, last dose was 2days prior to admission. The daughter did not know the exact dose.

## 2020-10-13 NOTE — ADVANCED PRACTICE NURSE CONSULT - RECOMMEDATIONS
Recommend follow up care at [facility]: [phone]. Address: [address] [place] [address].   Nutrition consult; patient with unstagable pressure injury to left upper buttock extending to sacral fold.      Topical Recommendations:  Left upper buttock extending to sacral fold- Cleanse wound and periwound skin with SAF-clens, rinse with NS, pat dry. Apply Liquid barrier film to periwound skin. Apply TRIAD moisture barrier paste to wound base, cover with silicone foam with border. Change daily and prn if soiled and/or compromised.    Apply sween 24 to bilateral lower extremities daily.    Transfer to low air loss bed therapy, continue heel elevation with Z-flex fluidized positioning boots, continue to turn & reposition q2h with Z-larry fluidized positioning device, continue moisture management with priscila moisture barrier cream to remaining exposed skin of buttock and perineum & single breathable pad, continue measures to decrease friction/shear/pressure.     Continue with nutritional support as per dietary/orders.    Findings and plan discussed with patient and primary team. Please reconsult WOC service line if tissue type changes are noted.

## 2020-10-13 NOTE — ED PROVIDER NOTE - PHYSICAL EXAMINATION
General: pleasant elderly man in no acute distress  Head: normocephalic, atraumatic  Eyes: PERRL, EOMI  Mouth: moist mucous membranes  Neck: supple neck  CV: normal rate and rhythm, no LE edema, peripheral pulses 2+ bilaterally  Respiratory: clear to auscultation bilaterally  Abdomen: soft, nontender, nondistended  : no suprapubic tenderness, no CVAT  MSK: left foot s/p amputation with no signs of infection  Neuro: alert and oriented x1-2, speech clear, moving all extremities spontaneously  Skin: ecchymosis of right arm

## 2020-10-13 NOTE — ADVANCED PRACTICE NURSE CONSULT - REASON FOR CONSULT
Patient seen on skin care rounds after wound care referral received for assessment of skin impairment and recommendations of topical management. 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 The differential includes but is not limited to leukemia vs. Steroid induced leukocytosis vs. Leukemoid reaction. Given lack of fever and localizing symptoms infectious etiology is significantly less likely. Patient interviewed, prior to admission was at rehabilitation center. As per patient he is aware that he has a wound to the left buttock, appeared approximately 3 weeks ago, when he was less mobile and spent majority of time in bed. As per patient he has been incontinent of stool, as per bedside RN patient has had blood stool, MD aware. As per patient he has a prosthetic shoe for the left lower leg, was able to walk independently prior to hospitalization. Patient seen and followed by Internal medicine, Heme/Onc,

## 2020-10-13 NOTE — ED PROVIDER NOTE - ATTENDING CONTRIBUTION TO CARE
sent from NH for thrombocyptopenia. unclear of chronicity. patient also with wbc to 76. patient denies complains. ecchymosis noted to right arm but no other evidence of bleeding. will repeat labs to confirm. likely admit for heme workup. no diarrhea to point to c diff

## 2020-10-13 NOTE — PHARMACOTHERAPY INTERVENTION NOTE - COMMENTS
Medication history is complete. Medication list updated in Outpatient Medication Record (OMR). Please call spectra [phone] if you have any questions.

## 2020-10-13 NOTE — H&P ADULT - NSHPPHYSICALEXAM_GEN_ALL_CORE
Vital Signs (24 Hrs):  T(C): 36.7 (10-13-20 @ 03:45), Max: 36.8 (10-12-20 @ 23:20)  HR: 75 (10-13-20 @ 03:45) (70 - 80)  BP: 149/72 (10-13-20 @ 03:45) (146/74 - 154/71)  RR: 16 (10-13-20 @ 03:45) (16 - 16)  SpO2: 97% (10-13-20 @ 03:45) (97% - 98%)  Wt(kg): --  Daily     Daily     I&O's Summary    PHYSICAL EXAM  GENERAL: NAD, lying comfortably in bed   HEAD:  Atraumatic, Normocephalic  EYES: EOMI b/l, PERRLA b/l, conjunctiva and sclera clear  NECK: Supple, No JVD, No LAD   CHEST/LUNG: Clear to auscultation bilaterally; No wheeze or ronchi  HEART: Regular rate and rhythm; S1 and S2 present, No murmurs, rubs, or gallops  ABDOMEN: Soft, Nontender, Nondistended; Bowel sounds present  EXTREMITIES:  LLE amputation below ankle No clubbing, cyanosis, or edema, buising of medial RUE  NEURO: AAOx3, non-focal   SKIN: No rashes or lesions Vital Signs (24 Hrs):  T(C): 36.7 (10-13-20 @ 03:45), Max: 36.8 (10-12-20 @ 23:20)  HR: 75 (10-13-20 @ 03:45) (70 - 80)  BP: 149/72 (10-13-20 @ 03:45) (146/74 - 154/71)  RR: 16 (10-13-20 @ 03:45) (16 - 16)  SpO2: 97% (10-13-20 @ 03:45) (97% - 98%)  Wt(kg): --  Daily     Daily     I&O's Summary    PHYSICAL EXAM  GENERAL: NAD, lying comfortably in bed   HEAD:  Atraumatic, Normocephalic  EYES: EOMI b/l, PERRLA b/l, conjunctiva and sclera clear  NECK: Supple, No JVD, No LAD   CHEST/LUNG: Clear to auscultation bilaterally; No wheeze or ronchi  HEART: Regular rate and rhythm; S1 and S2 present, No murmurs, rubs, or gallops  ABDOMEN: Soft, Nontender, Nondistended; Bowel sounds present  EXTREMITIES:  LLE amputation below ankle No clubbing, cyanosis, or edema, buising of medial RUE  NEURO: AAOx3, non-focal   SKIN: +unstagable ulcer on buttock, nonerythematous

## 2020-10-13 NOTE — ED ADULT NURSE NOTE - CHIEF COMPLAINT QUOTE
pt brought in by ems from [facility], sent in for abnormal labs, platelet count is low. denies pain, fevers, or other symptoms. reports being in his usual state of health.

## 2020-10-13 NOTE — H&P ADULT - PROBLEM SELECTOR PLAN 2
patient with known thrombocytopenia  currently afebrile and not bleeding  hold to transfuse at this time   maintain active type and screen.

## 2020-10-13 NOTE — ADVANCED PRACTICE NURSE CONSULT - ASSESSMENT
General: A&O x 4, currently bedbound requires full assist mobilizing bilateral lower extremities for turning and positioning, incontinent of urine and stool (noted bloody stool to bilateral inner buttock and perirectal area, perineal care provided). Skin warm, dry with increased moisture in intertriginous folds, adequate skin turgor. Abdominal surgical linear scars noted. Scattered areas of ecchymosis noted, largest area to left flank. Left foot s/p amputation with stump; skin intact. Right heel blanchable erythema with dry-flaky skin.    Left upper buttock extending to sacral fold- patient turned to left side during assessment- mixed etiology unstagable pressure injury complicated by friction and sheer (very irregular borders) and incontinence associated dermatitis (reported diaper usage)- [id number]- 25% deep-red darkened dermis circumferentially along wound borders  eschar from epidermis, 75% hard, minimally moist, firmly attached eschar at wound center. Scant serosanguinous drainage; unable to assess for odor (patient seen during COVID-19 pandemic N95 and surgical mask in place). Wound edges with slight maceration, purple-maroon discoloration and skin slippage. Remaining periwound skin intact with risk of incontinence associated dermatitis; no induration, no increased warmth, no edema, no erythema noted, patient reports tenderness to touch. Goals of care: conservative autolytic debridement (patient reported increase skin fragility with bruising see H & P, thrombocytopenia), monitor for changes in tissue type, continue to offload pressure, protect periwound skin, protect from friction and sheer.    **Discussion with ED staff, [place] low air loss support surface ordered. Upon encounter patient with Z-larry fluidizied positioning device in place for pressure offloading.

## 2020-10-13 NOTE — PROGRESS NOTE ADULT - PROBLEM SELECTOR PLAN 8
Problem: Discharge planning issues. Plan: Transitions of Care Status:  1.  Name of PCP:  2.  PCP Contacted on Admission: [ ] Y    [ ] N    3.  PCP contacted at Discharge: [ ] Y    [ ] N    [ ] N/A  4.  Post-Discharge Appointment Date and Location:  5.  Summary of Handoff given to PCP: Problem: Discharge planning issues. Plan: Transitions of Care Status:  1.  Name of PCP: Dr. Montgomery Hematologist   2.  PCP Contacted on Admission: [ ] Y    [ ] N    3.  PCP contacted at Discharge: [ ] Y    [ ] N    [ ] N/A  4.  Post-Discharge Appointment Date and Location:  5.  Summary of Handoff given to PCP: Problem: Discharge planning issues. Plan: Transitions of Care Status:  1.  Name of PCP: Dr. Montgomery Hematologist, PCP [phone]  2.  PCP Contacted on Admission: [ ] Y    [ ] N    3.  PCP contacted at Discharge: [ ] Y    [ ] N    [ ] N/A  4.  Post-Discharge Appointment Date and Location:  5.  Summary of Handoff given to PCP:

## 2020-10-13 NOTE — H&P ADULT - PROBLEM SELECTOR PLAN 6
Problem: Discharge planning issues. Plan: Transitions of Care Status:  1.  Name of PCP:  2.  PCP Contacted on Admission: [ ] Y    [ ] N    3.  PCP contacted at Discharge: [ ] Y    [ ] N    [ ] N/A  4.  Post-Discharge Appointment Date and Location:  5.  Summary of Handoff given to PCP: will need wound care consult this AM

## 2020-10-13 NOTE — PROGRESS NOTE ADULT - SUBJECTIVE AND OBJECTIVE BOX
Amor Montes, PGY-1  Internal Medicine  Pager: TO - [phone] and [facility] - [phone]     PROGRESS NOTE:     Patient is a 80y old  Male who presents with a chief complaint of thrombocytopenia (13 Oct 2020 06:10)    SUBJECTIVE / OVERNIGHT EVENTS: Admitted to the hospital over night. Physical complaints at time of admission to the hospital include some blood in his bowel movements at the rehab center as well as frequent bruising.     REVIEW OF SYSTEMS:  CONSTITUTIONAL: No weakness, fevers or chills  EYES/ENT: No visual changes;  No vertigo or throat pain   NECK: No pain or stiffness  RESPIRATORY: No cough, wheezing, hemoptysis; No shortness of breath  CARDIOVASCULAR: No chest pain or palpitations  GASTROINTESTINAL: No abdominal or epigastric pain. No nausea, vomiting, or hematemesis; Frequent bowel movements x3. No melena or hematochezia.  GENITOURINARY: No dysuria, frequency or hematuria  NEUROLOGICAL: No numbness or weakness  SKIN: No itching, burning, rashes, or lesions   HEMATOLOGIC: + Bruising   All other review of systems is negative unless indicated above.    MEDICATIONS  (STANDING):  amLODIPine   Tablet 10 milliGRAM(s) Oral daily  ascorbic acid 500 milliGRAM(s) Oral daily  bisacodyl 5 milliGRAM(s) Oral at bedtime  carvedilol 25 milliGRAM(s) Oral every 12 hours  cyanocobalamin 1000 MICROGram(s) Oral daily  ergocalciferol 65105 Unit(s) Oral <User Schedule>  folic acid 1 milliGRAM(s) Oral daily  hydrocortisone Enema 100 milliGRAM(s) Rectal at bedtime  lisinopril 20 milliGRAM(s) Oral daily  pantoprazole    Tablet 40 milliGRAM(s) Oral before breakfast  sucralfate 1 Gram(s) Oral four times a day  zinc sulfate 220 milliGRAM(s) Oral daily    MEDICATIONS  (PRN):      CAPILLARY BLOOD GLUCOSE        I&O's Summary      PHYSICAL EXAM:  Vital Signs Last 24 Hrs  T(C): 36.4 (13 Oct 2020 10:04), Max: 36.8 (12 Oct 2020 23:20)  T(F): 97.6 (13 Oct 2020 10:04), Max: 98.2 (12 Oct 2020 23:20)  HR: 82 (13 Oct 2020 10:04) (65 - 82)  BP: 127/69 (13 Oct 2020 10:04) (127/69 - 154/71)  BP(mean): --  RR: 17 (13 Oct 2020 10:04) (16 - 17)  SpO2: 99% (13 Oct 2020 10:04) (97% - 99%)    CONSTITUTIONAL: NAD, lying in bed comfortably   EYES/ENT: EOMI, PERRL, anicteric no conjunctival injection  NECK: Supple   RESPIRATORY: CTA bilaterally without wheezing, rales or rhonchi  CARDIOVASCULAR: Normal s1 and s2 without murmur, rubs, gallops, RRR, No peripheral edema   GASTROINTESTINAL: NT, ND, +BS, No rebound/guarding, no hepatosplenomegaly  GENITOURINARY: No suprapubic tendernes or CVA  NEUROLOGICAL: A and O x3 (year), answering questions appropriately, able to ambulate, No gross focal neurological deficits on exam   SKIN: No rash, ulcers or skin lesions        LABS:                        8.0    76.82 )-----------( 35       ( 13 Oct 2020 01:00 )             25.0     10-13    137  |  103  |  36<H>  ----------------------------<  266<H>  4.3   |  24  |  0.60    Ca    9.3      13 Oct 2020 01:00    TPro  4.6<L>  /  Alb  2.9<L>  /  TBili  0.8  /  DBili  x   /  AST  13  /  ALT  21  /  AlkPhos  42  10-13    PT/INR - ( 13 Oct 2020 01:00 )   PT: 15.2 SEC;   INR: 1.35          PTT - ( 13 Oct 2020 01:00 )  PTT:28.9 SEC            RADIOLOGY & ADDITIONAL TESTS:  Results Reviewed:   Imaging Personally Reviewed:  Electrocardiogram Personally Reviewed:    COORDINATION OF CARE:  Care Discussed with Consultants/Other Providers [Y/N]:  Prior or Outpatient Records Reviewed [Y/N]:   Amor Montes, PGY-1  Internal Medicine  Pager: [facility] - [phone] and [facility] - [phone]     PROGRESS NOTE:     Patient is a 80y old  Male who presents with a chief complaint of thrombocytopenia (13 Oct 2020 06:10)    Interval events:   9/30: discharged from [place] on decadron for thrombocytopenia   10/5-10/8: hallucinations consistent with psychosis from steroids resulting in half of the dose being given and a taper   10/11: last dose of decadron received     SUBJECTIVE / OVERNIGHT EVENTS: Admitted to the hospital over night. Physical complaints at time of admission to the hospital include some blood in his bowel movements at the rehab center as well as frequent bruising. The patient had large bowel movement in the ED. Stat labs were ordered. Denies cp, sob, palpitations, nausea, abdominal pain or vomiting     REVIEW OF SYSTEMS:  CONSTITUTIONAL: No weakness, fevers or chills  EYES/ENT: No visual changes;  No vertigo or throat pain   NECK: No pain or stiffness  RESPIRATORY: No cough, wheezing, hemoptysis; No shortness of breath  CARDIOVASCULAR: No chest pain or palpitations  GASTROINTESTINAL: No abdominal or epigastric pain. No nausea, vomiting, or hematemesis; Frequent bowel movements x3. No melena or hematochezia.  GENITOURINARY: No dysuria, frequency or hematuria  NEUROLOGICAL: No numbness or weakness  SKIN: No itching, burning, rashes, or lesions   HEMATOLOGIC: + Bruising   All other review of systems is negative unless indicated above.    MEDICATIONS  (STANDING):  amLODIPine   Tablet 10 milliGRAM(s) Oral daily  ascorbic acid 500 milliGRAM(s) Oral daily  bisacodyl 5 milliGRAM(s) Oral at bedtime  carvedilol 25 milliGRAM(s) Oral every 12 hours  cyanocobalamin 1000 MICROGram(s) Oral daily  ergocalciferol 76692 Unit(s) Oral <User Schedule>  folic acid 1 milliGRAM(s) Oral daily  hydrocortisone Enema 100 milliGRAM(s) Rectal at bedtime  lisinopril 20 milliGRAM(s) Oral daily  pantoprazole    Tablet 40 milliGRAM(s) Oral before breakfast  sucralfate 1 Gram(s) Oral four times a day  zinc sulfate 220 milliGRAM(s) Oral daily    MEDICATIONS  (PRN):      CAPILLARY BLOOD GLUCOSE        I&O's Summary      PHYSICAL EXAM:  Vital Signs Last 24 Hrs  T(C): 36.4 (13 Oct 2020 10:04), Max: 36.8 (12 Oct 2020 23:20)  T(F): 97.6 (13 Oct 2020 10:04), Max: 98.2 (12 Oct 2020 23:20)  HR: 82 (13 Oct 2020 10:04) (65 - 82)  BP: 127/69 (13 Oct 2020 10:04) (127/69 - 154/71)  BP(mean): --  RR: 17 (13 Oct 2020 10:04) (16 - 17)  SpO2: 99% (13 Oct 2020 10:04) (97% - 99%)    CONSTITUTIONAL: NAD, lying in bed comfortably   EYES/ENT: EOMI, PERRL, anicteric no conjunctival injection  NECK: Supple   RESPIRATORY: CTA bilaterally without wheezing, rales or rhonchi  CARDIOVASCULAR: Normal s1 and s2 without murmur, rubs, gallops, RRR, No peripheral edema   GASTROINTESTINAL: NT, ND, +BS, No rebound/guarding, no hepatosplenomegaly  GENITOURINARY: No suprapubic tenderness or CVA  NEUROLOGICAL: A and O x3 (year), answering questions appropriately, unable to ambulate No gross focal neurological deficits on exam   SKIN: Sacral ulcer covered by gauze, no rash, lower back hematoma, R elbow hematoma       LABS:                        8.0    76.82 )-----------( 35       ( 13 Oct 2020 01:00 )             25.0     10-13    137  |  103  |  36<H>  ----------------------------<  266<H>  4.3   |  24  |  0.60    Ca    9.3      13 Oct 2020 01:00    TPro  4.6<L>  /  Alb  2.9<L>  /  TBili  0.8  /  DBili  x   /  AST  13  /  ALT  21  /  AlkPhos  42  10-13    PT/INR - ( 13 Oct 2020 01:00 )   PT: 15.2 SEC;   INR: 1.35          PTT - ( 13 Oct 2020 01:00 )  PTT:28.9 SEC            RADIOLOGY & ADDITIONAL TESTS:  Results Reviewed:   Imaging Personally Reviewed:  Electrocardiogram Personally Reviewed:    COORDINATION OF CARE:  Care Discussed with Consultants/Other Providers [Y/N]:  Prior or Outpatient Records Reviewed [Y/N]:

## 2020-10-13 NOTE — ED ADULT NURSE REASSESSMENT NOTE - NS ED NURSE REASSESS COMMENT FT1
report received from night RN, pt vitally stable, offers no complaints at this time. breathing even and unlabored on room air. pt assisted to comfortable position. awaiting bed on 9S.

## 2020-10-13 NOTE — H&P ADULT - PROBLEM SELECTOR PLAN 7
Need for prophylactic measure. Plan: DVT ppx: SCDS  Diet: DASH  Dispo: pending heme/onc eval  GOC: full code.

## 2020-10-13 NOTE — CONSULT NOTE ADULT - SUBJECTIVE AND OBJECTIVE BOX
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 for GIB in the setting of thrombocytopenia sent in from rehab for irregular labs. Patient has noticed increased ski fragility with bruising otherwise in normal state of health. Patient denies sick contacts, travel, unknown substances, changes in medications. Denies headache, changes in vision, lightheadedness, chest pain, shortness of rbeath, nausea, vomiting, diarrhea, constipation, dysuria, fevers chills.   Of note patient has separate MRN [id number]  Patient was admitted to [facility] 9/16 to 9/30 for weakness found to have GIB requiring 7 untis of prbc with EGD done 9/17 - showed gastric ulcer; Bx benign and 9/25 colonscopy showing ticosis and rectal ulcer with no neoplasm and 1 week of coritsol enema. Had home nifedipine changed to amlodipine. Patient also underwent BMB for thrombocytopenia although without clear evidence of MDS was not diagnostic of cancer and discharged to rehab with short course of decadron    In the ED vitals 98.2, 70 HR, /74, RR 16, 97% RA  Labs: WBC 76.82, plt 35, Hgb 8.0 (13 Oct 2020 06:10)      14 point ROS otherwise negative    PAST MEDICAL & SURGICAL HISTORY:  Sarcoma    Prostate CA    Colon cancer    HTN (hypertension)    Hypertension    S/P hip replacement    H/O colectomy        Allergies    No Known Allergies    Intolerances        MEDICATIONS  (STANDING):  ascorbic acid 500 milliGRAM(s) Oral daily  bisacodyl 5 milliGRAM(s) Oral at bedtime  cyanocobalamin 1000 MICROGram(s) Oral daily  ergocalciferol 96009 Unit(s) Oral <User Schedule>  folic acid 1 milliGRAM(s) Oral daily  hydrocortisone Enema 100 milliGRAM(s) Rectal at bedtime  pantoprazole  Injectable 80 milliGRAM(s) IV Push once  pantoprazole  Injectable 40 milliGRAM(s) IV Push two times a day  sucralfate 1 Gram(s) Oral four times a day  zinc sulfate 220 milliGRAM(s) Oral daily    MEDICATIONS  (PRN):      FAMILY HISTORY:  FH: cancer        SOCIAL HISTORY: No EtOH, no tobacco        VITALS:   T(F): 97.9 (10-13-20 @ 11:44), Max: 98.2 (10-12-20 @ 23:20)  HR: 72 (10-13-20 @ 11:44)  BP: 138/65 (10-13-20 @ 11:44)  RR: 16 (10-13-20 @ 11:44)  SpO2: 98% (10-13-20 @ 11:44)  Wt(kg): --    PHYSICAL EXAM    GENERAL: NAD, well-developed  HEAD:  Atraumatic, Normocephalic  EYES: EOMI, PERRLA, conjunctiva and sclera clear  NECK: Supple, No JVD  CHEST/LUNG: Clear to auscultation bilaterally; No wheeze  HEART: Regular rate and rhythm; No murmurs, rubs, or gallops  ABDOMEN: Soft, Nontender, Nondistended; Bowel sounds present  EXTREMITIES:  2+ Peripheral Pulses, No clubbing, cyanosis, or edema  NEUROLOGY: non-focal  SKIN: No rashes or lesions    LABS:                         8.0    76.82 )-----------( 35       ( 13 Oct 2020 01:00 )             25.0     10-13    137  |  103  |  36<H>  ----------------------------<  266<H>  4.3   |  24  |  0.60    Ca    9.3      13 Oct 2020 01:00    TPro  4.6<L>  /  Alb  2.9<L>  /  TBili  0.8  /  DBili  x   /  AST  13  /  ALT  21  /  AlkPhos  42  10-13      PT/INR - ( 13 Oct 2020 01:00 )   PT: 15.2 SEC;   INR: 1.35          PTT - ( 13 Oct 2020 01:00 )  PTT:28.9 SEC  .Urine Clean Catch (Midstream)  09-20 @ 12:08   No growth  --  --      .Urine Clean Catch (Midstream)  09-17 @ 09:03   >=3 organisms. Probable collection contamination.  --  --      .Blood Blood-Peripheral  09-16 @ 14:04   No Growth Final  --  --          IMAGING: HPI:  Majority of history from chart review as pt not good historian. 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 Pt had extensive hospitalization at OSH for GI bleed requiring multiple transfusions. Had endoscopies performed as well. Thought to be in setting of thrombocytopenia. Hematology evaluated pt and bone marrow performed. Did not have any diagnostic abnormalities and flow was unremarkable. Now he is presenting from rehab with labs showing significant findings of thrombocytopenia and elevated white count. He reports he was told he low blood counts in the past but does remember having any workup. Unsure if he has received chemo in the past. At present, has no complaints. Denies any bleeding, shortness of breath, chest pain, lightheadedness, or fevers.     14 point ROS otherwise negative    PAST MEDICAL & SURGICAL HISTORY:  Sarcoma  Prostate CA  Colon cancer  HTN (hypertension)  Hypertension  S/P hip replacement  H/O colectomy        Allergies    No Known Allergies    Intolerances        MEDICATIONS  (STANDING):  ascorbic acid 500 milliGRAM(s) Oral daily  bisacodyl 5 milliGRAM(s) Oral at bedtime  cyanocobalamin 1000 MICROGram(s) Oral daily  ergocalciferol 87457 Unit(s) Oral <User Schedule>  folic acid 1 milliGRAM(s) Oral daily  hydrocortisone Enema 100 milliGRAM(s) Rectal at bedtime  pantoprazole  Injectable 80 milliGRAM(s) IV Push once  pantoprazole  Injectable 40 milliGRAM(s) IV Push two times a day  sucralfate 1 Gram(s) Oral four times a day  zinc sulfate 220 milliGRAM(s) Oral daily    MEDICATIONS  (PRN):      FAMILY HISTORY:  FH: cancer        SOCIAL HISTORY: No EtOH, no tobacco        VITALS:   T(F): 97.9 (10-13-20 @ 11:44), Max: 98.2 (10-12-20 @ 23:20)  HR: 72 (10-13-20 @ 11:44)  BP: 138/65 (10-13-20 @ 11:44)  RR: 16 (10-13-20 @ 11:44)  SpO2: 98% (10-13-20 @ 11:44)  Wt(kg): --    PHYSICAL EXAM    GENERAL: NAD, well-developed  HEAD:  Atraumatic, Normocephalic  EYES: EOMI, PERRLA, conjunctiva and sclera clear  NECK: Supple, No JVD  CHEST/LUNG: Clear to auscultation bilaterally; No wheeze  HEART: Regular rate and rhythm; No murmurs, rubs, or gallops  ABDOMEN: Soft, Nontender, Nondistended; Bowel sounds present  EXTREMITIES:  No clubbing, cyanosis, or edema  NEUROLOGY: non-focal  SKIN: No rashes or lesions    LABS:                         8.0    76.82 )-----------( 35       ( 13 Oct 2020 01:00 )             25.0     10-13    137  |  103  |  36<H>  ----------------------------<  266<H>  4.3   |  24  |  0.60    Ca    9.3      13 Oct 2020 01:00    TPro  4.6<L>  /  Alb  2.9<L>  /  TBili  0.8  /  DBili  x   /  AST  13  /  ALT  21  /  AlkPhos  42  10-13      PT/INR - ( 13 Oct 2020 01:00 )   PT: 15.2 SEC;   INR: 1.35          PTT - ( 13 Oct 2020 01:00 )  PTT:28.9 SEC  .Urine Clean Catch (Midstream)  09-20 @ 12:08   No growth  --  --      .Urine Clean Catch (Midstream)  09-17 @ 09:03   >=3 organisms. Probable collection contamination.  --  --      .Blood Blood-Peripheral  09-16 @ 14:04   No Growth Final  --  --          IMAGING:

## 2020-10-14 NOTE — DIETITIAN INITIAL EVALUATION ADULT. - OTHER INFO
Pt. s/p rapid response this morning.  Spoke with RN & MICU medical attending.     Upon meeting with Pt., he appears lethargic and with difficulty breathing--> RN informed.   Pt. unable to answer nutrition related questions @ this time.    Pt. now s/p 2nd rapid response... pending transfer to MICU.      Currently NPO given concern for possible GIB.      Weight Hx, per previous admission chart review:  77.1kg (Sept 16 2020)  77.2kg (Sept 30 2020)  100.3kg (currently Oct 14 2020)  This would suggest possible significant weight increase (52lbs???) x <1 month.

## 2020-10-14 NOTE — PROGRESS NOTE ADULT - PROBLEM SELECTOR PLAN 3
September colonoscopy was performed, confirmed rectal ulcer, but reportedly in the last year. Bloody bowel movement today.   - GI recommendations appreciated   - continue with protonix 40 mg IV bid  - keep active T&S, trend CBC bid, transfuse for Hgb<7 or symptomatic anemia  - will try to obtain endoscopy/colonoscopy reports from [facility]  - c/w hydrocortisone enema, will continue for rectal ulcer  - No stool studies sent Known to be a chronic issue per family since 2017 however patient did not bring it to his daughters attention until he began having these problems with active bleeding. Thrombocytopenic with multiple hematomas on the R elbow and lower back. Reports active bleeding in his bowel movements of a moderate amount. Had a recent bowel movement while in the ED with blood present.   - Hemoglobin currently stable despite bloody bm this morning  - GI following

## 2020-10-14 NOTE — CONSULT NOTE ADULT - CONSULT REQUESTED BY NAME
Storm Luz Your opinion matters! Thank you for choosing the [facility] [place]. You might receive a survey in the mail about your experience today to evaluate our clinic. Please fill it out and return it in the pre-paid envelope.  It was a pleasure to care for you today!     Test Results:  Our goal is to report all test results ordered by our care provider within 7-14 days. If you do not receive your test results either by phone or by mail within 14 days after your visit with our office please call our office at [phone] and ask to speak with a member of our care team.

## 2020-10-14 NOTE — PROGRESS NOTE ADULT - SUBJECTIVE AND OBJECTIVE BOX
Chief Complaint:  Patient is a 80y old  Male who presents with a chief complaint of thrombocytopenia (14 Oct 2020 07:37)      Interval Events:     No bleeding/pRBC transfusions overnight. Medicine team and nursing staff report one bloody bowel movement this morning.   Patient appears altered this morning with dysarthric speech and labored breathing. Started on supplemental oxygen.   Due to change in clinical status, primary team will be calling a RRT.     Allergies:  No Known Allergies      Hospital Medications:  acetaminophen  IVPB .. 1000 milliGRAM(s) IV Intermittent once  ascorbic acid 500 milliGRAM(s) Oral daily  cyanocobalamin 1000 MICROGram(s) Oral daily  ergocalciferol 46505 Unit(s) Oral <User Schedule>  folic acid 1 milliGRAM(s) Oral daily  hydrocortisone Enema 100 milliGRAM(s) Rectal at bedtime  influenza   Vaccine 0.5 milliLiter(s) IntraMuscular once  pantoprazole  Injectable 40 milliGRAM(s) IV Push two times a day  piperacillin/tazobactam IVPB. 3.375 Gram(s) IV Intermittent once  piperacillin/tazobactam IVPB.. 3.375 Gram(s) IV Intermittent every 8 hours  sucralfate 1 Gram(s) Oral four times a day  vancomycin  IVPB      zinc sulfate 220 milliGRAM(s) Oral daily      PMHX/PSHX:  Sarcoma    Sarcoma of prostate    Prostate CA    Colon cancer    HTN (hypertension)    Hypertension    S/P hip replacement    H/O colectomy        Family history:  FH: cancer        ROS:     General:  No weight loss, fevers, chills, night sweats, fatigue   Eyes:  No vision changes  ENT:  No sore throat, pain, runny nose  CV:  No chest pain, palpitations, dizziness   Resp:  No SOB, cough, wheezing  GI:  See HPI  :  No burning with urination, hematuria  Muscle:  No pain, weakness  Neuro:  No weakness/tingling, memory problems  Psych:  No fatigue, insomnia, mood problems, depression  Heme:  No easy bruisability  Skin:  No rash, edema      PHYSICAL EXAM:     GENERAL:  Appears lethargic,   HEENT:  Conjunctivae clear, sclera anicteric  CHEST:  No increased effort w/ respirations, breathing labored +oxygen nasal cannula  HEART:  Regular rhythm & rate  ABDOMEN:  Soft, non-tender, non-distended  RECTUM: Dark brown stool on diaper  EXTREMITIES:  no LE edema  SKIN:  No rash/erythema/ecchymoses/jaundice   NEURO:  Alert, orientedx3 +dysarthric speech    Vital Signs:  Vital Signs Last 24 Hrs  T(C): 36.8 (14 Oct 2020 08:55), Max: 37.2 (13 Oct 2020 23:10)  T(F): 98.3 (14 Oct 2020 08:55), Max: 98.9 (13 Oct 2020 23:10)  HR: 100 (14 Oct 2020 08:55) (72 - 100)  BP: 131/62 (14 Oct 2020 08:55) (115/57 - 160/67)  BP(mean): --  RR: 18 (14 Oct 2020 08:55) (16 - 18)  SpO2: 95% (14 Oct 2020 08:55) (95% - 100%)  Daily     Daily     LABS:                        8.0    95.76 )-----------( 33       ( 14 Oct 2020 05:30 )             25.9     10-14    144  |  105  |  35<H>  ----------------------------<  160<H>  4.2   |  25  |  0.56    Ca    9.4      14 Oct 2020 05:30  Phos  4.0     10-14  Mg     1.9     10-14    TPro  4.6<L>  /  Alb  2.9<L>  /  TBili  0.8  /  DBili  x   /  AST  13  /  ALT  21  /  AlkPhos  42  10-13    LIVER FUNCTIONS - ( 13 Oct 2020 01:00 )  Alb: 2.9 g/dL / Pro: 4.6 g/dL / ALK PHOS: 42 u/L / ALT: 21 u/L / AST: 13 u/L / GGT: x           PT/INR - ( 13 Oct 2020 01:00 )   PT: 15.2 SEC;   INR: 1.35     PTT - ( 13 Oct 2020 01:00 )  PTT:28.9 SEC    Imaging:

## 2020-10-14 NOTE — CONSULT NOTE ADULT - ASSESSMENT
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 RRT called for hypotension and altered mental status in the setting of GIB and possible concurrent infection.    #Hypotension  - RRT called for hypotension to the 80s with altered mental status  - Patient with known GI bleed presenting from outside hospital, concurrent thrombocytopenia  - During examination, found to have rectal temperature of 101.5  - POCUS demonstrating hyperdynamic LV with reduced lumen volume and end-systolic effacement with small IV likely indicating volume depletion. RV less than 60% the size of the LV.  - Etiology of hypotension likely combination of GI bleed and septic shock  - S/p 2L LR, ordered for 1U pRBCs. During RRT, SBP improved to 120-140s. Hemodynamically stable.  - Recommend maintenance of 2 large bore IVs, IV fluid resuscitation, active type and screen with close monitoring of CBC and PRN transfusions as necessary (also monitor WBC count as patient at risk for blast crisis given significantly elevated levels).  - Recommend transthoracic echo to assess cardiac function  - Given fever, recommend empiric antibiotic treatment, f/u blood cultures  - GI and Hematology following  - Per patient's hemodynamic stability, patient is currently not a candidate for MICU admission. Please reconsult as necessary if the patient's clinical status deteriorates. Thank you.    Case discussed with Dr. Paul Vang (MICU Fellow) and Dr. Warren Baca (ICU Attending).    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 RRT called for hypotension and altered mental status in the setting of GIB and possible concurrent infection.    #Hypotension  - RRT called for hypotension to the 80s with altered mental status  - Patient with known GI bleed presenting from outside hospital, concurrent thrombocytopenia  - During examination, found to have rectal temperature of 101.5  - POCUS demonstrating hyperdynamic LV with reduced lumen volume and end-systolic effacement with small IV likely indicating volume depletion. RV less than 60% the size of the LV.  - Etiology of hypotension likely combination of GI bleed and septic shock; less likely obstructive or cardiogenic in nature.  - S/p 2L LR, ordered for 1U pRBCs. During RRT, SBP improved to 120-140s. Hemodynamically stable.  - Recommend maintenance of 2 large bore IVs, IV fluid resuscitation, active type and screen, proton pump inhibitors with close monitoring of vital signs/CBC and PRN transfusions as necessary (also monitor WBC count as patient at risk for blast crisis given significantly elevated levels).  - Recommend transthoracic echo to assess cardiac function  - Given fever, recommend empiric antibiotic treatment, f/u blood cultures; PRN Tylenol for elevated temperature  - GI and Hematology following  - Per patient's hemodynamic stability, patient is currently not a candidate for MICU admission. Please reconsult as necessary if the patient's clinical status deteriorates. Thank you.    Case discussed with Dr. [facility] (MICU Fellow) and Dr. Warren Baca (ICU Attending).    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 RRT called for hypotension and altered mental status in the setting of GIB and possible concurrent infection; initially stable, but second RRT called for increased work of breathing, transferred to MICU, intubated and sedated.     #Neuro  - Prior to intubation, awake and alert  - Intubated and sedated, will wean as tolerated  - Given likelihood of blast crisis, will obtain CT head to assess for any intracranial pathology    #Respiratory  **Increased work of breathing  - Apparent belly breathing, pending respiratory distress  - Transferred to MICU, intubated and sedated for airway protection. Post-intubation ventilator settings 26/550/100/5. ABG 7.27/37/204/17.  - Will wean ventilator settings as possible    #Cardiovascular  **Hypotension  - 1st RRT called for hypotension to the 80s with altered mental status  - Patient with known GI bleed presenting from outside hospital, concurrent thrombocytopenia  - During examination, found to have rectal temperature of 101.5  - POCUS demonstrating hyperdynamic LV with reduced lumen volume and end-systolic effacement with small IV likely indicating volume depletion. RV less than 60% the size of the LV.  - Etiology of hypotension likely combination of GI bleed and septic shock; less likely obstructive or cardiogenic in nature.  - S/p 2L LR, ordered for 1U pRBCs. During RRT, SBP improved to 120-140s. Hemodynamically stable.  - Was on norepinephrine, phenylephrine, and vasopressin; now just on norepinephrine and phenylephrine, will wean as tolerated  - Transthoracic echo to assess cardiac function  - Given fever, recommend empiric antibiotic treatment (zosyn Q12H and vancomycin by level), f/u blood cultures; PRN Tylenol for elevated temperature  - GI and Hematology following    #GI  **GI bleed  - Known history of GI bleed, admitted at OSH, where he received 7U pRBCs in total  - GI following, too unstable to undergo endoscopic evaluation  - Most recent CBC with Hgb 6.9, to receive 1U pRBCs  - Will trend CBC Q6H  - Will maintain an active type and screen  - Transfuse if Hgb < 7  - OG tube placed, will initiate tube feeds when clinically appropriate  - CT abdomen and pelvis noncontrast to assess for any intraabdominal pathology    #/Renal  - Since RRT, Cr has increased from 0.56 to 0.85, likely to increase further given profound shock state  - Will monitor urine output via Kiran catheter    #Endocrine  - No acute issues  - Will monitor blood sugar levels    #ID  **Sepsis  - During first RRT, patient was noted to have rectal temperature to 101.5  - Likely intraabdominal source; no other overt source evident  - Will obtain CT chest/abdomen/pelvis noncontrast to assess for any overt pathology  - Will initiate empiric antibiotics (zosyn Q12H and vancomycin by level)  - Will follow up UA/Ucx/Bcx    #Heme/Onc  **Leukocytosis  - , increased from 76 over course of hospitalization; for reference, WBC 6.6 on 9/30  - Patient underwent BMB at outside hospital which was nondiagnostic   - Smear reviewed by Hematology, many stages seen; 1-2 blasts seen, less likely blast crisis but likely reactive from sepsis as leukocytosis is neutrophil-predominant  - Flow cytometry/BCR/ABL to be sent; JAK2  - Start hydroxyurea 500 TID  - Will check daily TLS and DIC labs  - Hematology following, will consider BMB pending results of above labs    #DVT Prophylaxis - holding prophylaxis in setting of GI bleed    #GOC - patient is FULL CODE ASSESSMENT/PLAN: 80M with HTN, colon cancer s/p colectomy and primary anastomosis at [place] in 2010, prostate cancer, 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 RRT called for hypotension and altered mental status in the setting of GIB and possible concurrent infection; initially stable, but second RRT called for increased work of breathing, transferred to MICU, intubated and sedated.     #Neuro  - Prior to intubation, awake and alert  - Intubated and sedated, will wean as tolerated  - Given likelihood of blast crisis, will obtain CT head to assess for any intracranial pathology    #Respiratory  **Increased work of breathing  - Apparent belly breathing, pending respiratory distress  - Transferred to MICU, intubated and sedated for airway protection. Post-intubation ventilator settings 26/550/100/5. ABG 7.27/37/204/17.  - Will wean ventilator settings as possible    #Cardiovascular  **Hypotension  - 1st RRT called for hypotension to the 80s with altered mental status  - Patient with known GI bleed presenting from outside hospital, concurrent thrombocytopenia  - During examination, found to have rectal temperature of 101.5  - POCUS demonstrating hyperdynamic LV with reduced lumen volume and end-systolic effacement with small IV likely indicating volume depletion. RV less than 60% the size of the LV.  - Etiology of hypotension likely combination of GI bleed and septic shock; less likely obstructive or cardiogenic in nature.  - S/p 2L LR, ordered for 1U pRBCs. During RRT, SBP improved to 120-140s. Hemodynamically stable.  - Was on norepinephrine, phenylephrine, and vasopressin; now just on norepinephrine and phenylephrine, will wean as tolerated  - Transthoracic echo to assess cardiac function  - Given fever, recommend empiric antibiotic treatment (zosyn Q12H and vancomycin by level), f/u blood cultures; PRN Tylenol for elevated temperature  - GI and Hematology following    #GI  **GI bleed  - Known history of GI bleed, admitted at OSH, where he received 7U pRBCs in total  - GI following, too unstable to undergo endoscopic evaluation  - Most recent CBC with Hgb 6.9, to receive 1U pRBCs  - Will trend CBC Q6H  - Will maintain an active type and screen  - Transfuse if Hgb < 7  - OG tube placed; currently NPO, will initiate tube feeds when clinically appropriate  - CT abdomen and pelvis noncontrast to assess for any intraabdominal pathology    #/Renal  - Since RRT, Cr has increased from 0.56 to 0.85, likely to increase further given profound shock state  - Will monitor urine output via Kiran catheter    #Endocrine  - No acute issues  - Will monitor blood sugar levels    #ID  **Sepsis  - During first RRT, patient was noted to have rectal temperature to 101.5  - Likely intraabdominal source; no other overt source evident  - Will obtain CT chest/abdomen/pelvis noncontrast to assess for any overt pathology  - Will initiate empiric antibiotics (zosyn Q12H and vancomycin by level)  - Will follow up UA/Ucx/Bcx    #Heme/Onc  **Leukocytosis  - , increased from 76 over course of hospitalization; for reference, WBC 6.6 on 9/30  - Patient underwent BMB at outside hospital which was nondiagnostic   - Smear reviewed by Hematology, many stages seen; 1-2 blasts seen, less likely blast crisis but likely reactive from sepsis as leukocytosis is neutrophil-predominant  - Flow cytometry/BCR/ABL to be sent; JAK2  - Start hydroxyurea 500 TID  - Will check daily TLS and DIC labs  - Hematology following, will consider BMB pending results of above labs    #DVT Prophylaxis - holding prophylaxis in setting of GI bleed    #GOC - patient is FULL CODE

## 2020-10-14 NOTE — PROCEDURE NOTE - NSPROCDETAILS_GEN_ALL_CORE
patient pre-oxygenated, tube inserted, placement confirmed/connected to ventilator
audible air bolus/placement confirmed by auscultation
location identified, draped/prepped, sterile technique used, needle inserted/introduced/connected to a pressurized flush line/Seldinger technique/positive blood return obtained via catheter/hemostasis with direct pressure, dressing applied/all materials/supplies accounted for at end of procedure/sutured in place/ultrasound guidance
sterile dressing applied/location identified, draped/prepped, sterile technique used/lumen(s) aspirated and flushed
lumen(s) aspirated and flushed/sterile dressing applied/guidewire recovered/sterile technique, catheter placed/ultrasound guidance

## 2020-10-14 NOTE — CONSULT NOTE ADULT - ASSESSMENT
80y M with concern for MDS, transferred to MICU for respiratory distress and distributive shock, nephrology consulted for metabolic acidosis

## 2020-10-14 NOTE — PROGRESS NOTE ADULT - PROBLEM SELECTOR PLAN 4
Was on home lisinopril are carvedilol at home however was switched to amlodipine. Will restart medications as BP tolerated.   - C/w with amlodipine  - Will restart Carvedilol and lisinopril in pm September colonoscopy was performed, confirmed rectal ulcer, but reportedly in the last year. Bloody bowel movement today. Transferred to MICU.   - GI following   - continue with protonix 40 mg IV bid  - keep active T&S, trend CBC bid, transfuse for Hgb<7 or symptomatic anemia  - will try to obtain endoscopy/colonoscopy reports from [facility]  - c/w hydrocortisone enema, will continue for rectal ulcer  - No stool studies sent

## 2020-10-14 NOTE — PROGRESS NOTE ADULT - SUBJECTIVE AND OBJECTIVE BOX
Hematology Follow-up    INTERVAL HPI/OVERNIGHT EVENTS:  Patient S&E at bedside. Pt is hypotensive and dyspneic.      VITAL SIGNS:  T(F): 97.3 (10-14-20 @ 11:18)  HR: 91 (10-14-20 @ 13:08)  BP: 99/55 (10-14-20 @ 13:08)  RR: 31 (10-14-20 @ 13:08)  SpO2: 100% (10-14-20 @ 13:05)  Wt(kg): --    PHYSICAL EXAM:    Constitutional: AAOx3, NAD  Eyes: EOMI, sclera non-icteric  Neck: supple, no masses, no JVD  Respiratory: CTA b/l, good air entry b/l, no wheezing, rhonchi, rales  Cardiovascular: RRR, normal S1S2  Gastrointestinal: soft, NTND, no masses palpable, BS normal in all four quadrants, no HSM  Extremities: no c/c/e  Neurological: Grossly intact  Skin: Normal temperature    MEDICATIONS  (STANDING):  ascorbic acid 500 milliGRAM(s) Oral daily  chlorhexidine 4% Liquid 1 Application(s) Topical <User Schedule>  cyanocobalamin 1000 MICROGram(s) Oral daily  ergocalciferol 45627 Unit(s) Oral <User Schedule>  folic acid 1 milliGRAM(s) Oral daily  hydrocortisone Enema 100 milliGRAM(s) Rectal at bedtime  influenza   Vaccine 0.5 milliLiter(s) IntraMuscular once  ketamine Infusion. 0.25 mG/kG/Hr (2.51 mL/Hr) IV Continuous <Continuous>  pantoprazole  Injectable 40 milliGRAM(s) IV Push two times a day  piperacillin/tazobactam IVPB.. 3.375 Gram(s) IV Intermittent every 8 hours  sucralfate 1 Gram(s) Oral four times a day  vancomycin  IVPB 1000 milliGRAM(s) IV Intermittent every 12 hours  vancomycin  IVPB      vasopressin Infusion 0.04 Unit(s)/Min (2.4 mL/Hr) IV Continuous <Continuous>  zinc sulfate 220 milliGRAM(s) Oral daily    MEDICATIONS  (PRN):      No Known Allergies      LABS:                        8.0    95.76 )-----------( 33       ( 14 Oct 2020 05:30 )             25.9     10-14    144  |  105  |  35<H>  ----------------------------<  160<H>  4.2   |  25  |  0.56    Ca    9.4      14 Oct 2020 05:30  Phos  4.0     10-14  Mg     1.9     10-14    TPro  4.6<L>  /  Alb  2.9<L>  /  TBili  0.8  /  DBili  x   /  AST  13  /  ALT  21  /  AlkPhos  42  10-13    PT/INR - ( 13 Oct 2020 15:00 )   PT: 15.2 SEC;   INR: 1.35          PTT - ( 13 Oct 2020 15:00 )  PTT:30.6 SEC Lactate Dehydrogenase, Serum: 562 U/L (10-14 @ 05:30)  Fibrinogen Assay: 122.0 mg/dL (10-13 @ 15:00)  Lactate Dehydrogenase, Serum: 456 U/L (10-13 @ 15:00)  Haptoglobin, Serum: 154 mg/dL (10-13 @ 15:00)      Fibrinogen Assay: 122.0 mg/dL (10-13-20 @ 15:00)  D-Dimer Assay, Quantitative: 1150 ng/mL (10-13-20 @ 15:00)  Iron Total, Serum: 147 ug/dL (10-13-20 @ 15:00)  Unsaturated Iron Binding Capacity: < 10 ug/dL (10-13-20 @ 15:00)  Ferritin, Serum: 1012 ng/mL (10-13-20 @ 15:00)  Haptoglobin, Serum: 154 mg/dL (10-13-20 @ 15:00)  Vitamin B12, Serum: 1712 pg/mL (10-13-20 @ 15:00)  Folate, Serum: 11.7 ng/mL (10-13-20 @ 15:00)        RADIOLOGY & ADDITIONAL TESTS:  Studies reviewed.      < from: Xray Chest 1 View- PORTABLE-Urgent (Xray Chest 1 View- PORTABLE-Urgent .) (10.14.20 @ 08:43) >  IMPRESSION:    1.  Clear lungs.    < end of copied text >   Hematology Follow-up    INTERVAL HPI/OVERNIGHT EVENTS:  Patient S&E at bedside. Pt is hypotensive and dyspneic; later intubated and brought to the ICU      VITAL SIGNS:  T(F): 97.3 (10-14-20 @ 11:18)  HR: 91 (10-14-20 @ 13:08)  BP: 99/55 (10-14-20 @ 13:08)  RR: 31 (10-14-20 @ 13:08)  SpO2: 100% (10-14-20 @ 13:05)  Wt(kg): --    PHYSICAL EXAM:    Constitutional: AAOx3, NAD  Eyes: EOMI, sclera non-icteric  Neck: supple, no masses, no JVD  Respiratory: CTA b/l, good air entry b/l, no wheezing, rhonchi, rales  Cardiovascular: RRR, normal S1S2  Gastrointestinal: soft, NTND, no masses palpable, BS normal in all four quadrants, no HSM  Extremities: no c/c/e  Neurological: Grossly intact  Skin: Normal temperature    MEDICATIONS  (STANDING):  ascorbic acid 500 milliGRAM(s) Oral daily  chlorhexidine 4% Liquid 1 Application(s) Topical <User Schedule>  cyanocobalamin 1000 MICROGram(s) Oral daily  ergocalciferol 56967 Unit(s) Oral <User Schedule>  folic acid 1 milliGRAM(s) Oral daily  hydrocortisone Enema 100 milliGRAM(s) Rectal at bedtime  influenza   Vaccine 0.5 milliLiter(s) IntraMuscular once  ketamine Infusion. 0.25 mG/kG/Hr (2.51 mL/Hr) IV Continuous <Continuous>  pantoprazole  Injectable 40 milliGRAM(s) IV Push two times a day  piperacillin/tazobactam IVPB.. 3.375 Gram(s) IV Intermittent every 8 hours  sucralfate 1 Gram(s) Oral four times a day  vancomycin  IVPB 1000 milliGRAM(s) IV Intermittent every 12 hours  vancomycin  IVPB      vasopressin Infusion 0.04 Unit(s)/Min (2.4 mL/Hr) IV Continuous <Continuous>  zinc sulfate 220 milliGRAM(s) Oral daily    MEDICATIONS  (PRN):      No Known Allergies      LABS:                        8.0    95.76 )-----------( 33       ( 14 Oct 2020 05:30 )             25.9     10-14    144  |  105  |  35<H>  ----------------------------<  160<H>  4.2   |  25  |  0.56    Ca    9.4      14 Oct 2020 05:30  Phos  4.0     10-14  Mg     1.9     10-14    TPro  4.6<L>  /  Alb  2.9<L>  /  TBili  0.8  /  DBili  x   /  AST  13  /  ALT  21  /  AlkPhos  42  10-13    PT/INR - ( 13 Oct 2020 15:00 )   PT: 15.2 SEC;   INR: 1.35          PTT - ( 13 Oct 2020 15:00 )  PTT:30.6 SEC Lactate Dehydrogenase, Serum: 562 U/L (10-14 @ 05:30)  Fibrinogen Assay: 122.0 mg/dL (10-13 @ 15:00)  Lactate Dehydrogenase, Serum: 456 U/L (10-13 @ 15:00)  Haptoglobin, Serum: 154 mg/dL (10-13 @ 15:00)      Fibrinogen Assay: 122.0 mg/dL (10-13-20 @ 15:00)  D-Dimer Assay, Quantitative: 1150 ng/mL (10-13-20 @ 15:00)  Iron Total, Serum: 147 ug/dL (10-13-20 @ 15:00)  Unsaturated Iron Binding Capacity: < 10 ug/dL (10-13-20 @ 15:00)  Ferritin, Serum: 1012 ng/mL (10-13-20 @ 15:00)  Haptoglobin, Serum: 154 mg/dL (10-13-20 @ 15:00)  Vitamin B12, Serum: 1712 pg/mL (10-13-20 @ 15:00)  Folate, Serum: 11.7 ng/mL (10-13-20 @ 15:00)        RADIOLOGY & ADDITIONAL TESTS:  Studies reviewed.      < from: Xray Chest 1 View- PORTABLE-Urgent (Xray Chest 1 View- PORTABLE-Urgent .) (10.14.20 @ 08:43) >  IMPRESSION:    1.  Clear lungs.    < end of copied text >   Hematology Follow-up    INTERVAL HPI/OVERNIGHT EVENTS:  Patient S&E at bedside. Pt is hypotensive and dyspneic; later intubated and brought to the ICU      VITAL SIGNS:  T(F): 97.3 (10-14-20 @ 11:18)  HR: 91 (10-14-20 @ 13:08)  BP: 99/55 (10-14-20 @ 13:08)  RR: 31 (10-14-20 @ 13:08)  SpO2: 100% (10-14-20 @ 13:05)  Wt(kg): --    PHYSICAL EXAM:    Constitutional: AAOx0, sedated  Eyes: sclera non-icteric  Neck: supple, no masses, no JVD  Respiratory: CTA b/l  Cardiovascular: RRR, normal S1S2, tachycardic  Gastrointestinal: soft, NTND  Extremities: no c/c/e  Neurological: Grossly intact  Skin: Normal temperature    MEDICATIONS  (STANDING):  ascorbic acid 500 milliGRAM(s) Oral daily  chlorhexidine 4% Liquid 1 Application(s) Topical <User Schedule>  cyanocobalamin 1000 MICROGram(s) Oral daily  ergocalciferol 25467 Unit(s) Oral <User Schedule>  folic acid 1 milliGRAM(s) Oral daily  hydrocortisone Enema 100 milliGRAM(s) Rectal at bedtime  influenza   Vaccine 0.5 milliLiter(s) IntraMuscular once  ketamine Infusion. 0.25 mG/kG/Hr (2.51 mL/Hr) IV Continuous <Continuous>  pantoprazole  Injectable 40 milliGRAM(s) IV Push two times a day  piperacillin/tazobactam IVPB.. 3.375 Gram(s) IV Intermittent every 8 hours  sucralfate 1 Gram(s) Oral four times a day  vancomycin  IVPB 1000 milliGRAM(s) IV Intermittent every 12 hours  vancomycin  IVPB      vasopressin Infusion 0.04 Unit(s)/Min (2.4 mL/Hr) IV Continuous <Continuous>  zinc sulfate 220 milliGRAM(s) Oral daily    MEDICATIONS  (PRN):      No Known Allergies      LABS:                        8.0    95.76 )-----------( 33       ( 14 Oct 2020 05:30 )             25.9     10-14    144  |  105  |  35<H>  ----------------------------<  160<H>  4.2   |  25  |  0.56    Ca    9.4      14 Oct 2020 05:30  Phos  4.0     10-14  Mg     1.9     10-14    TPro  4.6<L>  /  Alb  2.9<L>  /  TBili  0.8  /  DBili  x   /  AST  13  /  ALT  21  /  AlkPhos  42  10-13    PT/INR - ( 13 Oct 2020 15:00 )   PT: 15.2 SEC;   INR: 1.35          PTT - ( 13 Oct 2020 15:00 )  PTT:30.6 SEC Lactate Dehydrogenase, Serum: 562 U/L (10-14 @ 05:30)  Fibrinogen Assay: 122.0 mg/dL (10-13 @ 15:00)  Lactate Dehydrogenase, Serum: 456 U/L (10-13 @ 15:00)  Haptoglobin, Serum: 154 mg/dL (10-13 @ 15:00)      Fibrinogen Assay: 122.0 mg/dL (10-13-20 @ 15:00)  D-Dimer Assay, Quantitative: 1150 ng/mL (10-13-20 @ 15:00)  Iron Total, Serum: 147 ug/dL (10-13-20 @ 15:00)  Unsaturated Iron Binding Capacity: < 10 ug/dL (10-13-20 @ 15:00)  Ferritin, Serum: 1012 ng/mL (10-13-20 @ 15:00)  Haptoglobin, Serum: 154 mg/dL (10-13-20 @ 15:00)  Vitamin B12, Serum: 1712 pg/mL (10-13-20 @ 15:00)  Folate, Serum: 11.7 ng/mL (10-13-20 @ 15:00)        RADIOLOGY & ADDITIONAL TESTS:  Studies reviewed.      < from: Xray Chest 1 View- PORTABLE-Urgent (Xray Chest 1 View- PORTABLE-Urgent .) (10.14.20 @ 08:43) >  IMPRESSION:    1.  Clear lungs.    < end of copied text >

## 2020-10-14 NOTE — DIETITIAN INITIAL EVALUATION ADULT. - ENERGY NEEDS
Anthropometric Adjustments for Left Foot Amputation:  BMI= 32.1 kg/m^2  Ideal Body Weight= 163.5lbs (+/- 10%)

## 2020-10-14 NOTE — DIETITIAN INITIAL EVALUATION ADULT. - PERTINENT LABORATORY DATA
10-14 Na144 mmol/L Glu 160 mg/dL<H> K+ 4.2 mmol/L Cr  0.56 mg/dL BUN 35 mg/dL<H> 10-14 Phos 4.0 mg/dL 10-13 Alb 2.9 g/dL<L>    CAPILLARY BLOOD GLUCOSE      POCT Blood Glucose.: 108 mg/dL (14 Oct 2020 12:28)  POCT Blood Glucose.: 131 mg/dL (14 Oct 2020 09:05)

## 2020-10-14 NOTE — CONSULT NOTE ADULT - SUBJECTIVE AND OBJECTIVE BOX
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 Patient has noticed increased ski fragility with bruising otherwise in normal state of health. Patient denies sick contacts, travel, unknown substances, changes in medications. Denies headache, changes in vision, lightheadedness, chest pain, shortness of rbeath, nausea, vomiting, diarrhea, constipation, dysuria, fevers chills.   Of note patient has separate MRN [id number]  Patient was admitted to [facility] 9/16 to 9/30 for weakness found to have GIB requiring 7 units of prbc with EGD done 9/17 - showed gastric ulcer; Bx benign and 9/25 colonoscopy showing ticosis and rectal ulcer with no neoplasm and 1 week of cortisol enema. Had home nifedipine changed to amlodipine. Patient also underwent BMB for thrombocytopenia although without clear evidence of MDS was not diagnostic of cancer and discharged to rehab with short course of decadron    In the ED vitals 98.2, 70 HR, /74, RR 16, 97% RA  Labs: WBC 76.82, Plt 35, Hgb 8.0    RRT called 10/14 in AM due to hypotension to 80s and altered mental status. On examination, patient appeared encephalopathic as he was unable to answer any questions. Rectal temperature was ascertained to be 101.5; while undergoing rectal temperature assessment, patient was noted to have melanotic stool. Patient was given fluid and ordered for transfusion. During rapid, his blood pressure was noted to improve to -140s.    PAST MEDICAL & SURGICAL HISTORY:  Sarcoma    Prostate CA    Colon cancer    HTN (hypertension)    Hypertension    S/P hip replacement    H/O colectomy        Home Medications:  amLODIPine 10 mg oral tablet: 1 tab(s) orally once a day (13 Oct 2020 14:48)  bisacodyl 5 mg oral delayed release tablet: 2 tab(s) orally once a day (at bedtime) (13 Oct 2020 14:48)  carvedilol 25 mg oral tablet: 1 tab(s) orally 2 times a day (13 Oct 2020 14:48)  dexamethasone 2 mg oral tablet: 1 tab(s) orally 2 times a day for 3 days (13 Oct 2020 14:48)  dexamethasone 4 mg oral tablet: 1 tab(s) orally 2 times a day for 2 days (13 Oct 2020 14:48)  dexamethasone 6 mg oral tablet: 1 tab(s) orally 2 times a day for 2 days (13 Oct 2020 14:48)  ergocalciferol 50,000 intl units (1.25 mg) oral capsule: 1 cap(s) orally once a week on Tuesday at 9:00AM (13 Oct 2020 14:48)  folic acid 1 mg oral tablet: 1 tab(s) orally once a day (13 Oct 2020 14:48)  HumaLOG KwikPen 100 units/mL injectable solution: Sliding scale based on patient&#x27;s blood glucose level, injectable 3 times a day (before meals) (13 Oct 2020 14:48)  hydrocortisone 100 mg/60 mL rectal suspension: 60 milliliter(s) rectal once a day (at bedtime) (13 Oct 2020 14:48)  lisinopril 20 mg oral tablet: 1 tab(s) orally once a day (13 Oct 2020 14:48)  LPS Critical Care 17 g-100 kcal/30 mL oral liquid: 30 milliliter(s) orally once a day (13 Oct 2020 14:48)  MediHoney 100% topical paste: Apply topically to affected area (sacral region pressure ulcer) 2 times a day (13 Oct 2020 14:48)  pantoprazole 40 mg oral delayed release tablet: 1 tab(s) orally every 12 hours (13 Oct 2020 14:48)  Skin Prep Wipes: Apply topically to affected area once a day  Cleanse area with normal saline and pat dry. Wipe right heel with skin prep wipe and cover with PDP. (13 Oct 2020 14:48)  sodium chloride 0.9% injectable solution: 60 milliliter(s) per hour intravenously every shift for 2 days (13 Oct 2020 14:48)  sucralfate 1 g oral tablet: 1 tab(s) orally 4 times a day (before meals and at bedtime) (13 Oct 2020 14:48)  Tylenol 325 mg oral tablet: 2 tab(s) orally 2 times a day, 30 minutes prior to MediHoney treatment change (13 Oct 2020 14:48)  Vitamin B12 1000 mcg oral tablet: 1 tab(s) orally once a day (13 Oct 2020 14:48)  Vitamin C 500 mg oral tablet: 1 tab(s) orally once a day (13 Oct 2020 14:48)  zinc sulfate 220 mg oral capsule: 1 cap(s) orally once a day for 14 days (13 Oct 2020 14:48)      Allergies    No Known Allergies    Intolerances        FAMILY HISTORY:  FH: cancer        Social History:  never smoker, never alcohol, never drug, lives with daughter (13 Oct 2020 06:10)      CONSTITUTIONAL: No weakness. No fatigue. No fever.  HEAD: No head trauma.   EYES: No vision changes.  ENT: No hearing changes or tinnitus. No ear pain. No changes in smell. No nasal congestion or discharge. No sore throat. No voice hoarseness.   NECK: No neck pain or stiffness. No lumps.  RESPIRATORY: No cough. No SOB. No wheezing. No hemoptysis.   CARDIOVASCULAR: +Tachycardia.  GASTROINTESTINAL: +Melena.  BACK: No back pain.  GENITOURINARY: No dysuria. No frequency or urgency. No hesitancy. No incontinence. No urinary retention. No suprapubic pain. No hematuria.  EXTREMITY: No swelling.  MUSCULOSKELETAL: No joint pain or swelling. No fractures. No stiffness.    SKIN: No rashes. No itching. No skin, hair, or nail changes.  NEUROLOGICAL: +Altered mentation.   PSYCHIATRIC: No depression.       OBJECTIVE:  Vital Signs Last 24 Hrs  T(C): 36.8 (14 Oct 2020 08:55), Max: 37.2 (13 Oct 2020 23:10)  T(F): 98.3 (14 Oct 2020 08:55), Max: 98.9 (13 Oct 2020 23:10)  HR: 100 (14 Oct 2020 08:55) (72 - 100)  BP: 131/62 (14 Oct 2020 08:55) (115/57 - 160/67)  BP(mean): --  RR: 18 (14 Oct 2020 08:55) (16 - 18)  SpO2: 95% (14 Oct 2020 08:55) (95% - 100%)  I&O's Summary      MEDICATIONS  (STANDING):  acetaminophen  IVPB .. 1000 milliGRAM(s) IV Intermittent once  ascorbic acid 500 milliGRAM(s) Oral daily  cyanocobalamin 1000 MICROGram(s) Oral daily  ergocalciferol 75931 Unit(s) Oral <User Schedule>  folic acid 1 milliGRAM(s) Oral daily  hydrocortisone Enema 100 milliGRAM(s) Rectal at bedtime  influenza   Vaccine 0.5 milliLiter(s) IntraMuscular once  pantoprazole  Injectable 40 milliGRAM(s) IV Push two times a day  piperacillin/tazobactam IVPB.. 3.375 Gram(s) IV Intermittent every 8 hours  sucralfate 1 Gram(s) Oral four times a day  vancomycin  IVPB      zinc sulfate 220 milliGRAM(s) Oral daily    MEDICATIONS  (PRN):    Allergies    No Known Allergies    Intolerances        CONSTITUTIONAL: Awake but altered during examination.  EYES: No scleral icterus. No conjunctival injection.  ENT: Dry oral mucosa. No erythema. No pharyngeal exudates.   RESPIRATORY: CTAB. No wheezes, rales, or rhonchi. Slight increased work of breathing.  CARDIOVASCULAR: +S1/S2. No audible S3/S4. Intermittently tachycardic. No murmurs, rubs, or gallops. POCUS demonstrating hyperdynamic LV with reduced lumen volume and end-systolic effacement with small IV likely indicating volume depletion. RV less than 60% the size of the LV, no concern for R heart strain.  GASTROINTESTINAL: Soft, nontender, nondistended. +BS. No rebound or guarding. Noted melena when ascertaining rectal temperature.  EXTREMITY: No LE swelling or edema. EXTs warm to touch.  MUSCULOSKELETAL: Limited spontaneous movement of extremities.  NEUROLOGICAL: No                             8.0    95.76 )-----------( 33       ( 14 Oct 2020 05:30 )             25.9     PT/INR - ( 13 Oct 2020 01:00 )   PT: 15.2 SEC;   INR: 1.35          PTT - ( 13 Oct 2020 01:00 )  PTT:28.9 SEC  10-14    144  |  105  |  35<H>  ----------------------------<  160<H>  4.2   |  25  |  0.56    Ca    9.4      14 Oct 2020 05:30  Phos  4.0     10-14  Mg     1.9     10-14    TPro  4.6<L>  /  Alb  2.9<L>  /  TBili  0.8  /  DBili  x   /  AST  13  /  ALT  21  /  AlkPhos  42  10-13    CAPILLARY BLOOD GLUCOSE      POCT Blood Glucose.: 131 mg/dL (14 Oct 2020 09:05)    LIVER FUNCTIONS - ( 13 Oct 2020 01:00 )  Alb: 2.9 g/dL / Pro: 4.6 g/dL / ALK PHOS: 42 u/L / ALT: 21 u/L / AST: 13 u/L / GGT: x                   ABG - ( 14 Oct 2020 09:10 )  pH, Arterial: 7.53  pH, Blood: x     /  pCO2: 24    /  pO2: 131   / HCO3: 23    / Base Excess: -2.2  /  SaO2: 99.0                RADIOLOGY AND ADDITIONAL TESTS:    CONSULTANT NOTES REVIEWED:    CARE DISCUSSED WITH THE FOLLOWING CONSULTANTS/PROVIDERS: PLEASE BE AWARE THAT THIS IS A MICU ACCEPTANCE NOTE.    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 Patient has noticed increased ski fragility with bruising otherwise in normal state of health. Patient denies sick contacts, travel, unknown substances, changes in medications. Denies headache, changes in vision, lightheadedness, chest pain, shortness of rbeath, nausea, vomiting, diarrhea, constipation, dysuria, fevers chills.   Of note patient has separate MRN [id number]  Patient was admitted to [facility] 9/16 to 9/30 for weakness found to have GIB requiring 7 units of prbc with EGD done 9/17 - showed gastric ulcer; Bx benign and 9/25 colonoscopy showing ticosis and rectal ulcer with no neoplasm and 1 week of cortisol enema. Had home nifedipine changed to amlodipine. Patient also underwent BMB for thrombocytopenia although without clear evidence of MDS was not diagnostic of cancer and discharged to rehab with short course of decadron    In the ED vitals 98.2, 70 HR, /74, RR 16, 97% RA  Labs: WBC 76.82, Plt 35, Hgb 8.0    RRT called 10/14 in AM due to hypotension to 80s and altered mental status. On examination, patient appeared encephalopathic as he was unable to answer any questions. Rectal temperature was ascertained to be 101.5; while undergoing rectal temperature assessment, patient was noted to have melanotic stool. Patient was given fluid and ordered for transfusion. During rapid, his blood pressure was noted to improve to -140s. Patient was determined to not be a candidate for MICU at the time.    PAST MEDICAL & SURGICAL HISTORY:  Sarcoma    Prostate CA    Colon cancer    HTN (hypertension)    Hypertension    S/P hip replacement    H/O colectomy        Home Medications:  amLODIPine 10 mg oral tablet: 1 tab(s) orally once a day (13 Oct 2020 14:48)  bisacodyl 5 mg oral delayed release tablet: 2 tab(s) orally once a day (at bedtime) (13 Oct 2020 14:48)  carvedilol 25 mg oral tablet: 1 tab(s) orally 2 times a day (13 Oct 2020 14:48)  dexamethasone 2 mg oral tablet: 1 tab(s) orally 2 times a day for 3 days (13 Oct 2020 14:48)  dexamethasone 4 mg oral tablet: 1 tab(s) orally 2 times a day for 2 days (13 Oct 2020 14:48)  dexamethasone 6 mg oral tablet: 1 tab(s) orally 2 times a day for 2 days (13 Oct 2020 14:48)  ergocalciferol 50,000 intl units (1.25 mg) oral capsule: 1 cap(s) orally once a week on Tuesday at 9:00AM (13 Oct 2020 14:48)  folic acid 1 mg oral tablet: 1 tab(s) orally once a day (13 Oct 2020 14:48)  HumaLOG KwikPen 100 units/mL injectable solution: Sliding scale based on patient&#x27;s blood glucose level, injectable 3 times a day (before meals) (13 Oct 2020 14:48)  hydrocortisone 100 mg/60 mL rectal suspension: 60 milliliter(s) rectal once a day (at bedtime) (13 Oct 2020 14:48)  lisinopril 20 mg oral tablet: 1 tab(s) orally once a day (13 Oct 2020 14:48)  LPS Critical Care 17 g-100 kcal/30 mL oral liquid: 30 milliliter(s) orally once a day (13 Oct 2020 14:48)  MediHoney 100% topical paste: Apply topically to affected area (sacral region pressure ulcer) 2 times a day (13 Oct 2020 14:48)  pantoprazole 40 mg oral delayed release tablet: 1 tab(s) orally every 12 hours (13 Oct 2020 14:48)  Skin Prep Wipes: Apply topically to affected area once a day  Cleanse area with normal saline and pat dry. Wipe right heel with skin prep wipe and cover with PDP. (13 Oct 2020 14:48)  sodium chloride 0.9% injectable solution: 60 milliliter(s) per hour intravenously every shift for 2 days (13 Oct 2020 14:48)  sucralfate 1 g oral tablet: 1 tab(s) orally 4 times a day (before meals and at bedtime) (13 Oct 2020 14:48)  Tylenol 325 mg oral tablet: 2 tab(s) orally 2 times a day, 30 minutes prior to MediHoney treatment change (13 Oct 2020 14:48)  Vitamin B12 1000 mcg oral tablet: 1 tab(s) orally once a day (13 Oct 2020 14:48)  Vitamin C 500 mg oral tablet: 1 tab(s) orally once a day (13 Oct 2020 14:48)  zinc sulfate 220 mg oral capsule: 1 cap(s) orally once a day for 14 days (13 Oct 2020 14:48)      Allergies    No Known Allergies    Intolerances        FAMILY HISTORY:  FH: cancer        Social History:  never smoker, never alcohol, never drug, lives with daughter (13 Oct 2020 06:10)      CONSTITUTIONAL: No weakness. No fatigue. No fever.  HEAD: No head trauma.   EYES: No vision changes.  ENT: No hearing changes or tinnitus. No ear pain. No changes in smell. No nasal congestion or discharge. No sore throat. No voice hoarseness.   NECK: No neck pain or stiffness. No lumps.  RESPIRATORY: No cough. No SOB. No wheezing. No hemoptysis.   CARDIOVASCULAR: +Tachycardia.  GASTROINTESTINAL: +Melena.  BACK: No back pain.  GENITOURINARY: No dysuria. No frequency or urgency. No hesitancy. No incontinence. No urinary retention. No suprapubic pain. No hematuria.  EXTREMITY: No swelling.  MUSCULOSKELETAL: No joint pain or swelling. No fractures. No stiffness.    SKIN: No rashes. No itching. No skin, hair, or nail changes.  NEUROLOGICAL: +Altered mentation.   PSYCHIATRIC: No depression.       OBJECTIVE:  Vital Signs Last 24 Hrs  T(C): 36.8 (14 Oct 2020 08:55), Max: 37.2 (13 Oct 2020 23:10)  T(F): 98.3 (14 Oct 2020 08:55), Max: 98.9 (13 Oct 2020 23:10)  HR: 100 (14 Oct 2020 08:55) (72 - 100)  BP: 131/62 (14 Oct 2020 08:55) (115/57 - 160/67)  BP(mean): --  RR: 18 (14 Oct 2020 08:55) (16 - 18)  SpO2: 95% (14 Oct 2020 08:55) (95% - 100%)  I&O's Summary      MEDICATIONS  (STANDING):  acetaminophen  IVPB .. 1000 milliGRAM(s) IV Intermittent once  ascorbic acid 500 milliGRAM(s) Oral daily  cyanocobalamin 1000 MICROGram(s) Oral daily  ergocalciferol 42950 Unit(s) Oral <User Schedule>  folic acid 1 milliGRAM(s) Oral daily  hydrocortisone Enema 100 milliGRAM(s) Rectal at bedtime  influenza   Vaccine 0.5 milliLiter(s) IntraMuscular once  pantoprazole  Injectable 40 milliGRAM(s) IV Push two times a day  piperacillin/tazobactam IVPB.. 3.375 Gram(s) IV Intermittent every 8 hours  sucralfate 1 Gram(s) Oral four times a day  vancomycin  IVPB      zinc sulfate 220 milliGRAM(s) Oral daily    MEDICATIONS  (PRN):    Allergies    No Known Allergies    Intolerances        CONSTITUTIONAL: Awake but altered during examination.  EYES: No scleral icterus. No conjunctival injection.  ENT: Dry oral mucosa. No erythema. No pharyngeal exudates.   RESPIRATORY: CTAB. No wheezes, rales, or rhonchi. Slight increased work of breathing.  CARDIOVASCULAR: +S1/S2. No audible S3/S4. Intermittently tachycardic. No murmurs, rubs, or gallops. POCUS demonstrating hyperdynamic LV with reduced lumen volume and end-systolic effacement with small IV likely indicating volume depletion. RV less than 60% the size of the LV, no concern for R heart strain.  GASTROINTESTINAL: Soft, nontender, nondistended. +BS. No rebound or guarding. Noted melena when ascertaining rectal temperature.  EXTREMITY: No LE swelling or edema. EXTs warm to touch.  MUSCULOSKELETAL: Limited spontaneous movement of extremities.  NEUROLOGICAL: No                             8.0    95.76 )-----------( 33       ( 14 Oct 2020 05:30 )             25.9     PT/INR - ( 13 Oct 2020 01:00 )   PT: 15.2 SEC;   INR: 1.35          PTT - ( 13 Oct 2020 01:00 )  PTT:28.9 SEC  10-14    144  |  105  |  35<H>  ----------------------------<  160<H>  4.2   |  25  |  0.56    Ca    9.4      14 Oct 2020 05:30  Phos  4.0     10-14  Mg     1.9     10-14    TPro  4.6<L>  /  Alb  2.9<L>  /  TBili  0.8  /  DBili  x   /  AST  13  /  ALT  21  /  AlkPhos  42  10-13    CAPILLARY BLOOD GLUCOSE      POCT Blood Glucose.: 131 mg/dL (14 Oct 2020 09:05)    LIVER FUNCTIONS - ( 13 Oct 2020 01:00 )  Alb: 2.9 g/dL / Pro: 4.6 g/dL / ALK PHOS: 42 u/L / ALT: 21 u/L / AST: 13 u/L / GGT: x                   ABG - ( 14 Oct 2020 09:10 )  pH, Arterial: 7.53  pH, Blood: x     /  pCO2: 24    /  pO2: 131   / HCO3: 23    / Base Excess: -2.2  /  SaO2: 99.0                RADIOLOGY AND ADDITIONAL TESTS:    CONSULTANT NOTES REVIEWED:    CARE DISCUSSED WITH THE FOLLOWING CONSULTANTS/PROVIDERS: PLEASE BE AWARE THAT THIS IS A MICU ACCEPTANCE NOTE.    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 Patient has noticed increased ski fragility with bruising otherwise in normal state of health. Patient denies sick contacts, travel, unknown substances, changes in medications. Denies headache, changes in vision, lightheadedness, chest pain, shortness of rbeath, nausea, vomiting, diarrhea, constipation, dysuria, fevers chills.   Of note patient has separate MRN [id number]  Patient was admitted to [facility] 9/16 to 9/30 for weakness found to have GIB requiring 7 units of prbc with EGD done 9/17 - showed gastric ulcer; Bx benign and 9/25 colonoscopy showing ticosis and rectal ulcer with no neoplasm and 1 week of cortisol enema. Had home nifedipine changed to amlodipine. Patient also underwent BMB for thrombocytopenia although without clear evidence of MDS was not diagnostic of cancer and discharged to rehab with short course of decadron    In the ED vitals 98.2, 70 HR, /74, RR 16, 97% RA  Labs: WBC 76.82, Plt 35, Hgb 8.0    RRT called 10/14 in AM due to hypotension to 80s and altered mental status. On examination, patient appeared encephalopathic as he was unable to answer any questions. Rectal temperature was ascertained to be 101.5; while undergoing rectal temperature assessment, patient was noted to have melanotic stool. Patient was given fluid and ordered for transfusion. During rapid, his blood pressure was noted to improve to -140s. Patient was determined to not be a candidate for MICU at the time.    A second RRT was called because the patient developed increased work of breathing. Patient remained alert and oriented but continually stated that he was unable to breath; patient remained hemodynamically stable. He was placed on nonrebreather mask and was to be initiated on BIPAP but given clinical status, decision was made to transfer patient to the MICU for pending respiratory failure. On arrival to the MICU, patient was intubated and central and arterial lines were placed.    PAST MEDICAL & SURGICAL HISTORY:  Sarcoma    Prostate CA    Colon cancer    HTN (hypertension)    Hypertension    S/P hip replacement    H/O colectomy        Home Medications:  amLODIPine 10 mg oral tablet: 1 tab(s) orally once a day (13 Oct 2020 14:48)  bisacodyl 5 mg oral delayed release tablet: 2 tab(s) orally once a day (at bedtime) (13 Oct 2020 14:48)  carvedilol 25 mg oral tablet: 1 tab(s) orally 2 times a day (13 Oct 2020 14:48)  dexamethasone 2 mg oral tablet: 1 tab(s) orally 2 times a day for 3 days (13 Oct 2020 14:48)  dexamethasone 4 mg oral tablet: 1 tab(s) orally 2 times a day for 2 days (13 Oct 2020 14:48)  dexamethasone 6 mg oral tablet: 1 tab(s) orally 2 times a day for 2 days (13 Oct 2020 14:48)  ergocalciferol 50,000 intl units (1.25 mg) oral capsule: 1 cap(s) orally once a week on Tuesday at 9:00AM (13 Oct 2020 14:48)  folic acid 1 mg oral tablet: 1 tab(s) orally once a day (13 Oct 2020 14:48)  HumaLOG KwikPen 100 units/mL injectable solution: Sliding scale based on patient&#x27;s blood glucose level, injectable 3 times a day (before meals) (13 Oct 2020 14:48)  hydrocortisone 100 mg/60 mL rectal suspension: 60 milliliter(s) rectal once a day (at bedtime) (13 Oct 2020 14:48)  lisinopril 20 mg oral tablet: 1 tab(s) orally once a day (13 Oct 2020 14:48)  LPS Critical Care 17 g-100 kcal/30 mL oral liquid: 30 milliliter(s) orally once a day (13 Oct 2020 14:48)  MediHoney 100% topical paste: Apply topically to affected area (sacral region pressure ulcer) 2 times a day (13 Oct 2020 14:48)  pantoprazole 40 mg oral delayed release tablet: 1 tab(s) orally every 12 hours (13 Oct 2020 14:48)  Skin Prep Wipes: Apply topically to affected area once a day  Cleanse area with normal saline and pat dry. Wipe right heel with skin prep wipe and cover with PDP. (13 Oct 2020 14:48)  sodium chloride 0.9% injectable solution: 60 milliliter(s) per hour intravenously every shift for 2 days (13 Oct 2020 14:48)  sucralfate 1 g oral tablet: 1 tab(s) orally 4 times a day (before meals and at bedtime) (13 Oct 2020 14:48)  Tylenol 325 mg oral tablet: 2 tab(s) orally 2 times a day, 30 minutes prior to MediHoney treatment change (13 Oct 2020 14:48)  Vitamin B12 1000 mcg oral tablet: 1 tab(s) orally once a day (13 Oct 2020 14:48)  Vitamin C 500 mg oral tablet: 1 tab(s) orally once a day (13 Oct 2020 14:48)  zinc sulfate 220 mg oral capsule: 1 cap(s) orally once a day for 14 days (13 Oct 2020 14:48)      Allergies    No Known Allergies    Intolerances        FAMILY HISTORY:  FH: cancer        Social History:  never smoker, never alcohol, never drug, lives with daughter (13 Oct 2020 06:10)      CONSTITUTIONAL: No weakness. No fatigue. No fever.  ENT: No hearing changes or tinnitus. No ear pain. No changes in smell. No nasal congestion or discharge. No sore throat. No voice hoarseness.   NECK: No neck pain or stiffness. No lumps.  RESPIRATORY: +SOB.  CARDIOVASCULAR: +Tachycardia.  GASTROINTESTINAL: +Melena.  BACK: No back pain.  GENITOURINARY: No dysuria. No frequency or urgency. No hesitancy. No incontinence. No urinary retention. No suprapubic pain. No hematuria.  EXTREMITY: No swelling.  MUSCULOSKELETAL: No joint pain or swelling. No fractures. No stiffness.    SKIN: No rashes. No itching. No skin, hair, or nail changes.  NEUROLOGICAL: +Altered mentation.   PSYCHIATRIC: No depression.       OBJECTIVE:  Vital Signs Last 24 Hrs  T(C): 36.8 (14 Oct 2020 08:55), Max: 37.2 (13 Oct 2020 23:10)  T(F): 98.3 (14 Oct 2020 08:55), Max: 98.9 (13 Oct 2020 23:10)  HR: 100 (14 Oct 2020 08:55) (72 - 100)  BP: 131/62 (14 Oct 2020 08:55) (115/57 - 160/67)  BP(mean): --  RR: 18 (14 Oct 2020 08:55) (16 - 18)  SpO2: 95% (14 Oct 2020 08:55) (95% - 100%)  I&O's Summary      MEDICATIONS  (STANDING):  acetaminophen  IVPB .. 1000 milliGRAM(s) IV Intermittent once  ascorbic acid 500 milliGRAM(s) Oral daily  cyanocobalamin 1000 MICROGram(s) Oral daily  ergocalciferol 99233 Unit(s) Oral <User Schedule>  folic acid 1 milliGRAM(s) Oral daily  hydrocortisone Enema 100 milliGRAM(s) Rectal at bedtime  influenza   Vaccine 0.5 milliLiter(s) IntraMuscular once  pantoprazole  Injectable 40 milliGRAM(s) IV Push two times a day  piperacillin/tazobactam IVPB.. 3.375 Gram(s) IV Intermittent every 8 hours  sucralfate 1 Gram(s) Oral four times a day  vancomycin  IVPB      zinc sulfate 220 milliGRAM(s) Oral daily    MEDICATIONS  (PRN):    Allergies    No Known Allergies    Intolerances        CONSTITUTIONAL: Awake and alert, visible distress.  EYES: No scleral icterus. No conjunctival injection.  ENT: Dry oral mucosa. No erythema. No pharyngeal exudates.   RESPIRATORY: Coarse breath sounds throughout. Apparent increased work of breathing.  CARDIOVASCULAR: +S1/S2. No audible S3/S4. Intermittently tachycardic. No murmurs, rubs, or gallops. POCUS demonstrating hyperdynamic LV with reduced lumen volume and end-systolic effacement with small IV likely indicating volume depletion. RV less than 60% the size of the LV, no concern for R heart strain.  GASTROINTESTINAL: Soft, nontender, nondistended. +BS. No rebound or guarding. Noted melena when ascertaining rectal temperature.  EXTREMITY: No LE swelling or edema. EXTs warm to touch.  MUSCULOSKELETAL: Limited spontaneous movement of extremities.  NEUROLOGICAL: A&Ox3.                            8.0    95.76 )-----------( 33       ( 14 Oct 2020 05:30 )             25.9     PT/INR - ( 13 Oct 2020 01:00 )   PT: 15.2 SEC;   INR: 1.35          PTT - ( 13 Oct 2020 01:00 )  PTT:28.9 SEC  10-14    144  |  105  |  35<H>  ----------------------------<  160<H>  4.2   |  25  |  0.56    Ca    9.4      14 Oct 2020 05:30  Phos  4.0     10-14  Mg     1.9     10-14    TPro  4.6<L>  /  Alb  2.9<L>  /  TBili  0.8  /  DBili  x   /  AST  13  /  ALT  21  /  AlkPhos  42  10-13    CAPILLARY BLOOD GLUCOSE      POCT Blood Glucose.: 131 mg/dL (14 Oct 2020 09:05)    LIVER FUNCTIONS - ( 13 Oct 2020 01:00 )  Alb: 2.9 g/dL / Pro: 4.6 g/dL / ALK PHOS: 42 u/L / ALT: 21 u/L / AST: 13 u/L / GGT: x                   ABG - ( 14 Oct 2020 09:10 )  pH, Arterial: 7.53  pH, Blood: x     /  pCO2: 24    /  pO2: 131   / HCO3: 23    / Base Excess: -2.2  /  SaO2: 99.0                RADIOLOGY AND ADDITIONAL TESTS:    CONSULTANT NOTES REVIEWED:    CARE DISCUSSED WITH THE FOLLOWING CONSULTANTS/PROVIDERS:

## 2020-10-14 NOTE — PROCEDURE NOTE - NSINDICATIONS_GEN_A_CORE
emergency venous access/critical illness/volume resuscitation
monitoring purposes/critical patient
respiratory distress/airway protection/mental status change/critical patient/respiratory failure
feeds
no peripheral access

## 2020-10-14 NOTE — PROGRESS NOTE ADULT - ASSESSMENT
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 Reviewed smear again today, many different stages of WBCs noted. Very rare 1-2 blasts seen but not many, less like blast crisis.  - Check TLS labs and DIC panel   - bone marrow reviewed from OSH which was nondiagnostic  - concern for chronic process such as chronic myelomonocytic leukemia vs CML    - PLEASE send flow cytometry, tubes given to nursing at bedside   - Check JAK2; pending. BCR/ABL to be checked through flow.  - Highly suspicious for sepsis given rectal tem of 101 and left shift on the differential. Pending w/u with cultures, CXR, etc   - will f/u above workup, consideration for repeat bone marrow biopsy as well depending on workup   - transfuse to keep Hg>7 and plts>10k   - Continue supportive care as needed, pt now in the ICU        Maribell Mccord MD  PGY 5, Oncology/Hematology fellow  (P) [phone]  After 5pm, please contact on-call team. 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 Reviewed smear again today, many different stages of WBCs noted. Very rare 1-2 blasts seen but not many, less like blast crisis. This looks more likely reactive from the sepsis.  - Check TLS labs and DIC panel   - bone marrow reviewed from OSH which was nondiagnostic  - concern for chronic process such as chronic myelomonocytic leukemia vs CML    - PLEASE send flow cytometry, tubes given to nursing at bedside   - Check JAK2; pending. BCR/ABL to be checked through flow.  - Highly suspicious for sepsis given rectal tem of 101 and left shift on the differential. Pending w/u with cultures, CXR, etc   - will f/u above workup, consideration for repeat bone marrow biopsy as well depending on workup   - transfuse to keep Hg>7 and plts>10k   - Aware that the leukocytosis got worse on the repeat lab. Flow cytometry is essential for the diagnosis and the requisition and tubes were given to the nurse. This seems to be more reactive process given mature cells on the smear. Start hydroxyurea 500mg TID.  - Continue supportive care as needed, pt now in the ICU    Case was discussed with the attending Dr. Luna.    Maribell Mccord MD  PGY 5, Oncology/Hematology fellow  (P) [phone]  After 5pm, please contact on-call team. 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 Reviewed smear again today, many different stages of WBCs noted. Very rare 1-2 blasts seen but not many, less like blast crisis. This looks more likely reactive from the sepsis.  - Check TLS labs and DIC panel   - bone marrow reviewed from OSH which was nondiagnostic  - concern for chronic process such as chronic myelomonocytic leukemia vs CML    - PLEASE send flow cytometry, tubes given to nursing at bedside   - Check for JAK2, BCR/ABL, PDGFR-A, PDGFR-B, SRSF2, TET2, ASXL1  - Highly suspicious for sepsis given rectal tem of 101 and left shift on the differential. Pending w/u with cultures, CXR, etc   - will f/u above workup, consideration for repeat bone marrow biopsy as well depending on workup   - transfuse to keep Hg>7 and plts>10k   - Aware that the leukocytosis got worse on the repeat lab. Flow cytometry is essential for the diagnosis and the requisition and tubes were given to the nurse. This seems to be more reactive process given mature cells on the smear. Start hydroxyurea 500mg TID.  - Continue supportive care as needed, pt now in the ICU    Case was discussed with the attending Dr. Luna.    Maribell Mccord MD  PGY 5, Oncology/Hematology fellow  (P) [phone]  After 5pm, please contact on-call team.

## 2020-10-14 NOTE — CONSULT NOTE ADULT - PROBLEM SELECTOR RECOMMENDATION 9
Patient with worsening HAGMA in the setting of  distributive shock and ANNEMARIE. Patient with worsening HAGMA in the setting of  distributive shock and ANNEMARIE. Initially this AM patient had predominant Alkalemia on ABG which may have been respiratory in etiology as pCO2 was low at 24 and BMP bicarb was 25. Now patient  HAGMA with respiratory compensation on the vent: pH 7.12, bicarb 16, AG 20, pCO2 29. Agree with bicarb gtt as long as patient is non-oliguric, IV abx for infection. Low threshold to initiate CRRT if pt becomes anuric/oliguric with persistent acidosis or hyperkalemia. Would trend BMP & ABG q4-6 hours

## 2020-10-14 NOTE — DIETITIAN INITIAL EVALUATION ADULT. - PROBLEM SELECTOR PLAN 5
unclear when last colonoscopy was, denies any bleeding or diarrhea. On hydrocortisone enema, will continue for now and obtain collateral

## 2020-10-14 NOTE — PROGRESS NOTE ADULT - PROBLEM SELECTOR PLAN 9
Problem: Discharge planning issues. Plan: Transitions of Care Status:  1.  Name of PCP: Dr. Mnotgomery Hematologist, PCP -[phone]  2.  PCP Contacted on Admission: [ ] Y    [X] N    3.  PCP contacted at Discharge: [ ] Y    [ ] N    [ ] N/A  4.  Post-Discharge Appointment Date and Location:  5.  Summary of Handoff given to PCP:

## 2020-10-14 NOTE — CONSULT NOTE ADULT - PROBLEM SELECTOR RECOMMENDATION 2
ANNEMARIE likely in setting of shock state, likely ATN. Reportedly non-oliguric. Most recent Scr up to .86mg/dl from baseline ~.6mg/dl. Doubt TLS as etiology given the relatively low uric acid and phos. Lactate elevation 2/2 ischemia from hypotension? vs. unclear etiology such as bowel. Would c/w bicarb gtt at this time. Can send off of UA, urine lytes. Trend BMP's, ABG's with lactate, TLS labs (Uric acid, phos, LDH, etc). Check CPK level as well with next blood draw. If anuria/oliguria noted with worsening acidosis or hyperkalemia despite bicarb gtt will need to initiate CRRT.

## 2020-10-14 NOTE — CONSULT NOTE ADULT - SUBJECTIVE AND OBJECTIVE BOX
[facility] DIVISION OF KIDNEY DISEASES AND HYPERTENSION -- INITIAL CONSULT NOTE  --------------------------------------------------------------------------------  Wilfred Dorado   Nephrology Fellow  Pager NS: [phone]/ 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 Found to have profound leukocytosis and thrombocytopenia with anemia concerning for MDS. RRT called 10/14 in AM due to hypotension to 80s and altered mental status, then developed increased WOB requiring transfer to the MICU. Patient was subsequently intubated and placed on IV pressors for hypotension. Nephrology was consulted for metabolic acidosis and potential CVVHD. Patient Scr this am prior to RRT was .56mg/dl which upon review of [facility] HIE/[place] appears his baseline. Bicarb on BMP at that time noted to be 25, ABG with pH 7.53, pCO2 24, Lactate 7.4.         PAST HISTORY  --------------------------------------------------------------------------------  PAST MEDICAL & SURGICAL HISTORY:  Sarcoma    Prostate CA    Colon cancer    HTN (hypertension)    Hypertension    S/P hip replacement    H/O colectomy      FAMILY HISTORY:  FH: cancer      PAST SOCIAL HISTORY:    ALLERGIES & MEDICATIONS  --------------------------------------------------------------------------------  Allergies    No Known Allergies    Intolerances      Standing Inpatient Medications  ascorbic acid 500 milliGRAM(s) Oral daily  chlorhexidine 0.12% Liquid 15 milliLiter(s) Oral Mucosa every 12 hours  chlorhexidine 4% Liquid 1 Application(s) Topical <User Schedule>  cyanocobalamin 1000 MICROGram(s) Oral daily  dextrose 50% Injectable 50 milliLiter(s) IV Push once  ergocalciferol 47209 Unit(s) Oral <User Schedule>  folic acid 1 milliGRAM(s) Oral daily  hydrocortisone Enema 100 milliGRAM(s) Rectal at bedtime  hydroxyurea 500 milliGRAM(s) Oral three times a day  influenza   Vaccine 0.5 milliLiter(s) IntraMuscular once  ketamine Injectable 100 milliGRAM(s) IV Push once  nitroglycerin    2% Ointment 1 Inch(s) Transdermal once  norepinephrine Infusion 0.05 MICROgram(s)/kG/Min IV Continuous <Continuous>  pantoprazole  Injectable 40 milliGRAM(s) IV Push two times a day  phenylephrine    Infusion 0.1 MICROgram(s)/kG/Min IV Continuous <Continuous>  piperacillin/tazobactam IVPB.. 3.375 Gram(s) IV Intermittent every 12 hours  propofol Infusion 10 MICROgram(s)/kG/Min IV Continuous <Continuous>  sodium bicarbonate  Infusion 0.224 mEq/kG/Hr IV Continuous <Continuous>  sucralfate 1 Gram(s) Oral four times a day  vancomycin  IVPB 1000 milliGRAM(s) IV Intermittent every 12 hours  vancomycin  IVPB      zinc sulfate 220 milliGRAM(s) Oral daily    PRN Inpatient Medications  sodium chloride 0.9% lock flush 10 milliLiter(s) IV Push every 1 hour PRN      REVIEW OF SYSTEMS  --------------------------------------------------------------------------------  Unable to obtain 2/2 clinical status     VITALS/PHYSICAL EXAM  --------------------------------------------------------------------------------  T(C): 36.7 (10-14-20 @ 15:33), Max: 37.2 (10-13-20 @ 23:10)  HR: 109 (10-14-20 @ 17:49) (0 - 132)  BP: 97/36 (10-14-20 @ 14:10) (45/16 - 181/52)  RR: 27 (10-14-20 @ 17:00) (17 - 38)  SpO2: 99% (10-14-20 @ 17:49) (88% - 100%)  Wt(kg): --  Height (cm): 177.8 (10-14-20 @ 09:47)  Weight (kg): 100.3 (10-14-20 @ 09:47)  BMI (kg/m2): 31.7 (10-14-20 @ 09:47)  BSA (m2): 2.18 (10-14-20 @ 09:47)      Physical Exam unable to perform as pt was in the process of being intubated      LABS/STUDIES  --------------------------------------------------------------------------------              6.9    182.78 >-----------<  51       [10-14-20 @ 14:20]              21.9     142  |  106  |  39  ----------------------------<  80      [10-14-20 @ 14:15]  3.8   |  16  |  0.85        Ca     7.7     [10-14-20 @ 14:15]      Mg     1.6     [10-14-20 @ 14:15]      Phos  4.9     [10-14-20 @ 14:15]    TPro  3.5  /  Alb  2.2  /  TBili  1.0  /  DBili  x   /  AST  20  /  ALT  19  /  AlkPhos  53  [10-14-20 @ 14:15]    PT/INR: PT 15.2 , INR 1.35       [10-13-20 @ 15:00]  PTT: 55.5       [10-14-20 @ 14:20]    Uric acid 6.7      [10-14-20 @ 14:15]        [10-14-20 @ 14:15]    Creatinine Trend:  SCr 0.85 [10-14 @ 14:15]  SCr 0.56 [10-14 @ 05:30]  SCr 0.60 [10-13 @ 01:00]  SCr 0.75 [09-30 @ 10:00]  SCr 0.58 [09-28 @ 06:25]    Urinalysis - [09-17-20 @ 01:11]      Color Yellow / Appearance Clear / SG 1.015 / pH 5.0      Gluc Negative / Ketone Trace  / Bili Negative / Urobili Negative       Blood Negative / Protein Negative / Leuk Est Negative / Nitrite Negative      RBC  / WBC  / Hyaline  / Gran  / Sq Epi  / Non Sq Epi  / Bacteria       Iron 147, TIBC 147, %sat --      [10-13-20 @ 15:00]  Ferritin 1012      [10-13-20 @ 15:00]  Vitamin D (25OH) 21.3      [09-21-20 @ 10:21]      Free Light Chains: kappa 2.14, lambda 1.29, ratio = 1.66      [09-19 @ 12:43]  Immunofixation Serum:    IgG Kappa Band Identified    Reference Range: None Detected      [09-19-20 @ 12:43]  SPEP Interpretation: Gamma-Migrating Paraprotein Identified      [09-19-20 @ 12:43]

## 2020-10-14 NOTE — DIETITIAN INITIAL EVALUATION ADULT. - PHYSICAL APPEARANCE
other (specify)/Unable to conduct in depth nutrition focused physical exam at this time given Pt's current medical condition./obese Left buttock unstageable pressure injury  3+ edema right foot and leg, per nursing flow sheets.  Hx of left foot amputation.     Observed 1+ edema left hand.

## 2020-10-14 NOTE — PROCEDURE NOTE - NSPOSTPRCRAD_GEN_A_CORE
post-procedure radiography performed
central line located in the/no pneumothorax/SVC/post-procedure radiography performed

## 2020-10-14 NOTE — DIETITIAN INITIAL EVALUATION ADULT. - ADD RECOMMEND
1)Diet deferred at this time given Pt.'s change in medical status.                                                                                 2)May have to consider alternate means of nutrition if Pt. with prolonged NPO status (>3 days).

## 2020-10-14 NOTE — PROGRESS NOTE ADULT - PROBLEM SELECTOR PLAN 2
Known to be a chronic issue per family since 2017 however patient did not bring it to his daughters attention until he began having these problems with active bleeding. Thrombocytopenic with multiple hematomas on the R elbow and lower back. Reports active bleeding in his bowel movements of a moderate amount. Had a recent bowel movement while in the ED with blood present.   - Hemoglobin 10.7 at discharge from [facility] VS 9/30 to 8.0 on admission today 10/13 8.0 after bloody bm 7.7 relatively stable Severe leukocytosis 76.82 with ? family hx of myeloma in daughter. The patient was on steroids in recent admission to [facility] for thrombocytopenia in the setting of GIB. BMB at  non diagnostic: Hypercellular bone marrow biopsy with myeloid hyperplasia, focal erythroid maturation, mild monocytosis, slightly decreased  megakaryocytes (difficult to assess due to fragmentation), and increased iron stores low suspicion for infection or AI process at this time, no suspicion of leukostasis. Given patients decompensation the patient was transferred to MICU  - transferred to MICU and family was informed   - TLS and DIC labs unremarkable   - Flow cytometry and Jak2 panel pending  - Blood cx's sent and cxr unremarkable   - Decardron dose at discharge caused psychosis and was decreased in half, last dose was 2days prior to admission. The daughter did not know the exact dose.

## 2020-10-14 NOTE — PROGRESS NOTE ADULT - SUBJECTIVE AND OBJECTIVE BOX
Amor Montes, PGY-1  Internal Medicine  Pager: GZ - [phone] and AQL [phone]     PROGRESS NOTE:     Patient is a 80y old  Male who presents with a chief complaint of thrombocytopenia (13 Oct 2020 15:10)      SUBJECTIVE / OVERNIGHT EVENTS: No acute overnight events.       REVIEW OF SYSTEMS:  CONSTITUTIONAL: No weakness, fevers or chills  EYES/ENT: No visual changes;  No vertigo or throat pain   NECK: No pain or stiffness  RESPIRATORY: No cough, wheezing, hemoptysis; No shortness of breath  CARDIOVASCULAR: No chest pain or palpitations  GASTROINTESTINAL: No abdominal or epigastric pain. No nausea, vomiting, or hematemesis; Frequent bowel movements x3. No melena or hematochezia.  GENITOURINARY: No dysuria, frequency or hematuria  NEUROLOGICAL: No numbness or weakness  SKIN: No itching, burning, rashes, or lesions   HEMATOLOGIC: + Bruising   All other review of systems is negative unless indicated above.    MEDICATIONS  (STANDING):  ascorbic acid 500 milliGRAM(s) Oral daily  cyanocobalamin 1000 MICROGram(s) Oral daily  ergocalciferol 85093 Unit(s) Oral <User Schedule>  folic acid 1 milliGRAM(s) Oral daily  hydrocortisone Enema 100 milliGRAM(s) Rectal at bedtime  influenza   Vaccine 0.5 milliLiter(s) IntraMuscular once  pantoprazole  Injectable 40 milliGRAM(s) IV Push two times a day  sucralfate 1 Gram(s) Oral four times a day  zinc sulfate 220 milliGRAM(s) Oral daily    MEDICATIONS  (PRN):      CAPILLARY BLOOD GLUCOSE        I&O's Summary      PHYSICAL EXAM:  Vital Signs Last 24 Hrs  T(C): 36.3 (14 Oct 2020 06:11), Max: 37.2 (13 Oct 2020 23:10)  T(F): 97.4 (14 Oct 2020 06:11), Max: 98.9 (13 Oct 2020 23:10)  HR: 91 (14 Oct 2020 06:11) (72 - 91)  BP: 160/67 (14 Oct 2020 06:11) (127/69 - 160/67)  BP(mean): --  RR: 17 (14 Oct 2020 06:11) (16 - 18)  SpO2: 100% (14 Oct 2020 06:11) (98% - 100%)    CONSTITUTIONAL: NAD, lying in bed comfortably   EYES/ENT: EOMI, PERRL, anicteric no conjunctival injection  NECK: Supple   RESPIRATORY: CTA bilaterally without wheezing, rales or rhonchi  CARDIOVASCULAR: Normal s1 and s2 without murmur, rubs, gallops, RRR, No peripheral edema   GASTROINTESTINAL: NT, ND, +BS, No rebound/guarding, no hepatosplenomegaly  GENITOURINARY: No suprapubic tenderness or CVA  NEUROLOGICAL: A and O x3 (year), answering questions appropriately, unable to ambulate No gross focal neurological deficits on exam   SKIN: Sacral ulcer covered by gauze, no rash, lower back hematoma, R elbow hematoma         LABS:                        8.0    76.82 )-----------( 35       ( 13 Oct 2020 01:00 )             25.0     10-13    137  |  103  |  36<H>  ----------------------------<  266<H>  4.3   |  24  |  0.60    Ca    9.3      13 Oct 2020 01:00    TPro  4.6<L>  /  Alb  2.9<L>  /  TBili  0.8  /  DBili  x   /  AST  13  /  ALT  21  /  AlkPhos  42  10-13    PT/INR - ( 13 Oct 2020 01:00 )   PT: 15.2 SEC;   INR: 1.35          PTT - ( 13 Oct 2020 01:00 )  PTT:28.9 SEC            RADIOLOGY & ADDITIONAL TESTS:  Results Reviewed:   Imaging Personally Reviewed:  Electrocardiogram Personally Reviewed:    COORDINATION OF CARE:  Care Discussed with Consultants/Other Providers [Y/N]:  Prior or Outpatient Records Reviewed [Y/N]:   Amor Montes, PGY-1  Internal Medicine  Pager: ZF - [phone] and OEH - [phone]     PROGRESS NOTE:     Patient is a 80y old  Male who presents with a chief complaint of thrombocytopenia (13 Oct 2020 15:10)      SUBJECTIVE / OVERNIGHT EVENTS: No acute overnight events. This mornign the      REVIEW OF SYSTEMS:  CONSTITUTIONAL: No weakness, fevers or chills  EYES/ENT: No visual changes;  No vertigo or throat pain   NECK: No pain or stiffness  RESPIRATORY: No cough, wheezing, hemoptysis; No shortness of breath  CARDIOVASCULAR: No chest pain or palpitations  GASTROINTESTINAL: No abdominal or epigastric pain. No nausea, vomiting, or hematemesis; Frequent bowel movements x3. No melena or hematochezia.  GENITOURINARY: No dysuria, frequency or hematuria  NEUROLOGICAL: No numbness or weakness  SKIN: No itching, burning, rashes, or lesions   HEMATOLOGIC: + Bruising   All other review of systems is negative unless indicated above.    MEDICATIONS  (STANDING):  ascorbic acid 500 milliGRAM(s) Oral daily  cyanocobalamin 1000 MICROGram(s) Oral daily  ergocalciferol 86895 Unit(s) Oral <User Schedule>  folic acid 1 milliGRAM(s) Oral daily  hydrocortisone Enema 100 milliGRAM(s) Rectal at bedtime  influenza   Vaccine 0.5 milliLiter(s) IntraMuscular once  pantoprazole  Injectable 40 milliGRAM(s) IV Push two times a day  sucralfate 1 Gram(s) Oral four times a day  zinc sulfate 220 milliGRAM(s) Oral daily    MEDICATIONS  (PRN):      CAPILLARY BLOOD GLUCOSE        I&O's Summary      PHYSICAL EXAM:  Vital Signs Last 24 Hrs  T(C): 36.3 (14 Oct 2020 06:11), Max: 37.2 (13 Oct 2020 23:10)  T(F): 97.4 (14 Oct 2020 06:11), Max: 98.9 (13 Oct 2020 23:10)  HR: 91 (14 Oct 2020 06:11) (72 - 91)  BP: 160/67 (14 Oct 2020 06:11) (127/69 - 160/67)  BP(mean): --  RR: 17 (14 Oct 2020 06:11) (16 - 18)  SpO2: 100% (14 Oct 2020 06:11) (98% - 100%)    CONSTITUTIONAL: NAD, lying in bed comfortably   EYES/ENT: EOMI, PERRL, anicteric no conjunctival injection  NECK: Supple   RESPIRATORY: CTA bilaterally without wheezing, rales or rhonchi  CARDIOVASCULAR: Normal s1 and s2 without murmur, rubs, gallops, RRR, No peripheral edema   GASTROINTESTINAL: NT, ND, +BS, No rebound/guarding, no hepatosplenomegaly  GENITOURINARY: No suprapubic tenderness or CVA  NEUROLOGICAL: A and O x3 (year), answering questions appropriately, unable to ambulate No gross focal neurological deficits on exam   SKIN: Sacral ulcer covered by gauze, no rash, lower back hematoma, R elbow hematoma         LABS:                        8.0    76.82 )-----------( 35       ( 13 Oct 2020 01:00 )             25.0     10-13    137  |  103  |  36<H>  ----------------------------<  266<H>  4.3   |  24  |  0.60    Ca    9.3      13 Oct 2020 01:00    TPro  4.6<L>  /  Alb  2.9<L>  /  TBili  0.8  /  DBili  x   /  AST  13  /  ALT  21  /  AlkPhos  42  10-13    PT/INR - ( 13 Oct 2020 01:00 )   PT: 15.2 SEC;   INR: 1.35          PTT - ( 13 Oct 2020 01:00 )  PTT:28.9 SEC            RADIOLOGY & ADDITIONAL TESTS:  Results Reviewed:   Imaging Personally Reviewed:  Electrocardiogram Personally Reviewed:    COORDINATION OF CARE:  Care Discussed with Consultants/Other Providers [Y/N]:  Prior or Outpatient Records Reviewed [Y/N]:   Amor Montes, PGY-1  Internal Medicine  Pager: QW - [phone] and ZET [phone]     PROGRESS NOTE:     Patient is a 80y old  Male who presents with a chief complaint of thrombocytopenia (13 Oct 2020 15:10)      SUBJECTIVE / OVERNIGHT EVENTS: No acute overnight events.     Interval Events:   This morning the patient began to have increased work of breathing and had waxing and waning mental status. He was denying SOB, but his awareness was gradually worsening. He is A and Ox3 (year at baseline yesterday) and was A and Ox1-2 this am. Blood pressure measurement was initially measured at 80/30 and mental status was began to worsen. Repeat blood pressure was ongoing and a rapid response was called by us the primary team. MICU was consulted and made aware of the patient.     During rapid response, patient underwent stat ABG, bedside echo (showing hyperdynamic LV with end systolic effacement ) and vitals showing rectal temperature of 100.7. Repeat blood pressure was 150/120. the patient was bolus with 1L LR and subsequently a second liter of LR. The patient was given 1 g of tylenol and antibiotics were started zosyn and vancomycin. MICU transfer was pending. Lactate on ABG was 7.1 with Ph 7.5 and CO2 of 24.     The patient improved mildly until shortly thereafter the patient began to deteriorate again was diaphoretic as well as ill appearing. Given this acute worsening the nurse called a rapid response. The patients work of breathing had significantly worsened. MICU was contacted and accepted for transfer without further incident. Patient was placed on levophed and Non-rebreathing O2 prior to transfer.     Daughter Marilyn, was contacted and reported that she would like her father to be full code and to go forward with intubation as required for his medical care. She was very upset at his current clinical status. She herself is dealing with a diagnosis of myeloma and she is concerned about the potential of her fathers MDS diagnosis.       REVIEW OF SYSTEMS:  CONSTITUTIONAL: No weakness, fevers or chills  EYES/ENT: No visual changes;  No vertigo or throat pain   NECK: No pain or stiffness  RESPIRATORY: No cough and hemoptysis; + SOB and + wheezing   CARDIOVASCULAR: No chest pain or palpitations  GASTROINTESTINAL: No abdominal or epigastric pain. No nausea, vomiting, or hematemesis; Frequent bowel movements x3 with melena.  No melena or hematochezia.  GENITOURINARY: No dysuria, frequency or hematuria  NEUROLOGICAL: No numbness or weakness  SKIN: No itching, burning, rashes, or lesions   HEMATOLOGIC: + Bruising   All other review of systems is negative unless indicated above.    MEDICATIONS  (STANDING):  ascorbic acid 500 milliGRAM(s) Oral daily  cyanocobalamin 1000 MICROGram(s) Oral daily  ergocalciferol 10673 Unit(s) Oral <User Schedule>  folic acid 1 milliGRAM(s) Oral daily  hydrocortisone Enema 100 milliGRAM(s) Rectal at bedtime  influenza   Vaccine 0.5 milliLiter(s) IntraMuscular once  pantoprazole  Injectable 40 milliGRAM(s) IV Push two times a day  sucralfate 1 Gram(s) Oral four times a day  zinc sulfate 220 milliGRAM(s) Oral daily    MEDICATIONS  (PRN):      CAPILLARY BLOOD GLUCOSE        I&O's Summary      PHYSICAL EXAM:  Vital Signs Last 24 Hrs  T(C): 36.3 (14 Oct 2020 06:11), Max: 37.2 (13 Oct 2020 23:10)  T(F): 97.4 (14 Oct 2020 06:11), Max: 98.9 (13 Oct 2020 23:10)  HR: 91 (14 Oct 2020 06:11) (72 - 91)  BP: 160/67 (14 Oct 2020 06:11) (127/69 - 160/67)  BP(mean): --  RR: 17 (14 Oct 2020 06:11) (16 - 18)  SpO2: 100% (14 Oct 2020 06:11) (98% - 100%)    CONSTITUTIONAL: Acute respiratory distress, mental status worse from baseline   EYES/ENT: EOMI, PERRL, anicteric no conjunctival injection  NECK: Supple   RESPIRATORY: Bilateral anterior lung field wheezing without rhonchi or crackles. Crackles present s/p 2L of fluid   CARDIOVASCULAR: Normal s1 and s2 without murmur, rubs, gallops, RRR, No peripheral edema   GASTROINTESTINAL: NT, ND, +BS, No rebound/guarding, no hepatosplenomegaly  GENITOURINARY: No suprapubic tenderness  NEUROLOGICAL: A and O x1), unable to ambulate. Waxing and waning mental status with varying neurological exam due to variability in ability to follow commands   SKIN: Sacral ulcer covered by gauze, no rash, lower back hematoma, R elbow hematoma         LABS:                        8.0    76.82 )-----------( 35       ( 13 Oct 2020 01:00 )             25.0     10-13    137  |  103  |  36<H>  ----------------------------<  266<H>  4.3   |  24  |  0.60    Ca    9.3      13 Oct 2020 01:00    TPro  4.6<L>  /  Alb  2.9<L>  /  TBili  0.8  /  DBili  x   /  AST  13  /  ALT  21  /  AlkPhos  42  10-13    PT/INR - ( 13 Oct 2020 01:00 )   PT: 15.2 SEC;   INR: 1.35          PTT - ( 13 Oct 2020 01:00 )  PTT:28.9 SEC      Blood Gas Arterial - Lactate: 7.1: Please note updated reference range. mmol/L (10.14.20 @ 09:10)

## 2020-10-14 NOTE — PROGRESS NOTE ADULT - PROBLEM SELECTOR PLAN 1
Severe leukocytosis 76.82 with ? family hx of myeloma in daughter. The patient was on steroids in recent admission to [facility] for thrombocytopenia in the setting of GIB. BMB at  non diagnostic: Hypercellular bone marrow biopsy with myeloid hyperplasia, focal erythroid maturation, mild monocytosis, slightly decreased  megakaryocytes (difficult to assess due to fragmentation), and increased iron stores low suspicion for infection or AI process at this time, no suspicion of leukostasis.  - Hematology recommendations appreciated   - TLS and DIC labs unremarkable   - Flow cytometry and Jak2 panel pending  - Blood cx's sent and cxr ordered   - Decardron dose at discharge caused psychosis and was decreased in half, last dose was 2days prior to admission. The daughter did not know the exact dose. Acute respiratory distress this am possibly secondary to septic shock. There was increased work of breathing and RRTs called x2. Chest x-ray unremarkable. ABG with elevated lactated of 7.1. Currently in shock possible secondary to sepsis.   - Planned for transfer to MICU.

## 2020-10-14 NOTE — PROGRESS NOTE ADULT - ASSESSMENT
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 The differential includes but is not limited to leukemia vs. Steroid induced leukocytosis vs. Leukemoid reaction (steroids. Given lack of fever and localizing symptoms infectious etiology is significantly less likely. 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 The differential includes but is not limited to leukemia vs. Steroid induced leukocytosis vs Infectious etiology. Patient decompensated this morning with acute respiratory distress, fever, AMS and elevated lactate on ABG found to be w/o significant chest x-ray findings. Possible sepsis cause of as cause of leukocytosis although this may be superimposed on hematologic malignancy. GI bleed appears to be stable despite persistent melena.

## 2020-10-14 NOTE — PROCEDURE NOTE - NSIOCONFIRMED_VASC_A_CORE
flushing with fluid/aspiration of blood/marrow mixture without difficulty/needle stands without support

## 2020-10-14 NOTE — DIETITIAN INITIAL EVALUATION ADULT. - PERTINENT MEDS FT
MEDICATIONS  (STANDING):  ascorbic acid 500 milliGRAM(s) Oral daily  cyanocobalamin 1000 MICROGram(s) Oral daily  ergocalciferol 83787 Unit(s) Oral <User Schedule>  folic acid 1 milliGRAM(s) Oral daily  hydrocortisone Enema 100 milliGRAM(s) Rectal at bedtime  influenza   Vaccine 0.5 milliLiter(s) IntraMuscular once  pantoprazole  Injectable 40 milliGRAM(s) IV Push two times a day  piperacillin/tazobactam IVPB.. 3.375 Gram(s) IV Intermittent every 8 hours  sucralfate 1 Gram(s) Oral four times a day  vancomycin  IVPB 1000 milliGRAM(s) IV Intermittent every 12 hours  vancomycin  IVPB      zinc sulfate 220 milliGRAM(s) Oral daily

## 2020-10-14 NOTE — PROGRESS NOTE ADULT - PROBLEM SELECTOR PLAN 6
- wound care consult Anemia is stable. GI is following and patient is being transferred to the MICU for management of respiratory distress and shock.

## 2020-10-14 NOTE — PROGRESS NOTE ADULT - PROBLEM SELECTOR PLAN 7
Need for prophylactic measure. Plan: DVT ppx: SCDS due to GI bleeding   Diet: DASH  Dispo: pending heme/onc eval  GOC: full code. - wound care consult

## 2020-10-14 NOTE — RAPID RESPONSE TEAM SUMMARY - NSSITUATIONBACKGROUNDRRT_GEN_ALL_CORE
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 The differential includes but is not limited to leukemia vs. Steroid induced leukocytosis vs. Leukemoid reaction.    RRT called for AMS. Primary team at bedside. Patient noted to be lethargic compared to baseline and oriented to self from baseline AOx3 yesterday. Reported to have had a large bloody BM this AM. Melena noted during RRT. Temp >101, HR 90s, initial systolic BP 96 -> 140 with IVF, RR 20, sating high 90s on 2L NC (reportedly started on O2 for comfort). No overt acute focal deficits. Recent labs reviewed. ABG sent. Second peripheral IV placed. Started on LR bolus, IV tylenol 1g, empiric vanc/zosyn (blood cultures were sent this AM). Ordered for 1U PRBC. Evaluated by MICU. Noted to have LV>RV, hyperdynamic LV with end systolic effacement, small IVC. Patient to continue aforementioned management on floor. Patient remained HD stable and protecting airway. RRT concluded. Primary team to follow up with GI and hematology.
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 The differential includes but is not limited to leukemia vs. Steroid induced leukocytosis vs. Leukemoid reaction.    RRT called for increased work of breathing. On exam , BP 94/45, RR 42, SpO2 97% on 15L NRB. AOx1-3 during encounter, regular rate, no murmurs, b/l scattered crackles. HR -> 90s. systolic BP -> 100s. Pressors made ready. Plan to start bipap. Patient transferred to MICU. Family notified by primary team. Patient remains full code at this time.

## 2020-10-14 NOTE — DIETITIAN INITIAL EVALUATION ADULT. - PROBLEM SELECTOR PLAN 1
patient with severe leukocytosis 76.82 with family hx of ?myeloma in daughter  patient was on steroids after last admission although usually not this severe  BMB at [facility] non diagnostic: Hypercellular bone marrow biopsy with myeloid hyperplasia, focal erythroid maturation, mild monocytosis, slightly decreased  megakaryocytes (difficult to assess due to fragmentation), and increased iron stores  low suspicion for infection or AI process at this time, no suspcion of leukostasis  consult Heme Onc in the AM.  will confirm with heme onc if needs to continue steroids (per facility med list decadron is listed as taper but last discharge had rec only 4 day course, will need to confirm if needs continuing)

## 2020-10-14 NOTE — CHART NOTE - NSCHARTNOTEFT_GEN_A_CORE
Spoke to patient's daughter Marilyn Muñoz who is the emergency POC and is currently making decisions for her father. Explained the risks and benefits of CRRT and patients daughter is willing to proceed. She verbally consented to CRRT. Consent form to be placed in chart in AM.     Orders for CRRT placed.

## 2020-10-14 NOTE — PROGRESS NOTE ADULT - PROBLEM SELECTOR PLAN 5
Patient with hgb of 8.0 recently had GIB from ulcers, may also have bone marrow deficit from underlying malignancy. Most likely multifactorial with bone marrow suppression and active blood loss from GI bleed.   - Maintain active type and screen   - GI consult for active GI bleed   - Anemia workup (folate, b12, Iron studies)   - Pantoprazole 40mg BID IV   - Monitor for active bleeding   - continue pantoprazole, folate, b12, sucralfate Was on home lisinopril are carvedilol at home however was switched to amlodipine. Will restart medications as BP tolerated.   - All medications bp should be held for shock    Patient with hgb of 8.0 recently had GIB from ulcers, may also have bone marrow deficit from underlying malignancy. Most likely multifactorial with bone marrow suppression and active blood loss from GI bleed.   - Maintain active type and screen   - GI consult for active GI bleed   - Anemia workup (folate, b12, Iron studies)   - Pantoprazole 40mg BID IV   - Monitor for active bleeding   - continue pantoprazole, folate, b12, sucralfate

## 2020-10-14 NOTE — PROGRESS NOTE ADULT - PROBLEM SELECTOR PLAN 8
Problem: Discharge planning issues. Plan: Transitions of Care Status:  1.  Name of PCP: Dr. Montgomery Hematologist, PCP -[phone]  2.  PCP Contacted on Admission: [ ] Y    [ ] N    3.  PCP contacted at Discharge: [ ] Y    [ ] N    [ ] N/A  4.  Post-Discharge Appointment Date and Location:  5.  Summary of Handoff given to PCP: Need for prophylactic measure. Plan: DVT ppx: SCDS due to GI bleeding   Diet: DASH  Dispo: pending heme/onc eval  GOC: full code. Family aware of critical condition wants to go forward with intubation MICU informed.  GOC: full code.

## 2020-10-14 NOTE — PROGRESS NOTE ADULT - ASSESSMENT
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     #Dark stools: Stool appears brown on exam however patient required pRBC transfusion this admission. Given recent upper GI bleeding, cannot rule out recurrent melena from bleeding gastric ulcer. Differential also includes anemia due to hematologic process in setting of concomitant thrombocytopenia.   #Altered mental status with dysarthria: Concern for acute stroke, possible cranial hemorrhage in setting of low platelets?  #Marked leukocytosis concerning for leukemia per Hematology   #Acute thrombocytopenia s/p bone marrow biopsy that non-diagnostic    Recommendations:  - consider CT head for change in mental status; CXR for labored breathing   - color of stool not consistent with upper GI bleeding and Hb stable over past 24 hours; additionally, recent change in mental status and new oxygen requirement would preclude patient from upper endoscopy at this time  - would obtain endoscopy/colonoscopy reports from [facility] as findings could not be found upon chart review in [place]   - continue pantoprazole 40 mg IV BID  - continue to monitor bowel movements for melena  - monitor CBC every 12 hours; transfuse for Hgb<7 or symptoms  - follow up Heme/Onc recommendations  - GI to follow    Anju Mireles PGY-5  Gastroenterology Fellow  Page #[phone]   Page #[phone] 5pm-7am on weekdays, and on weekends     79 yo man with history of colon CA s/p colectomy and primary anastomosis ([facility] 2010), prostate CA, fibrosarcoma s/p L foot amputation with recurrence in LLE, recent UGIB likely due to gastric ulcer (EGD at [facility] 09/2020) admitted from nursing facility with leukocytosis, anemia, and thrombocytopenia and found to have persistent rectal bleeding.     #Dark stools: Stool appears brown on exam however patient required pRBC transfusion this admission. Given recent upper GI bleeding, cannot rule out recurrent melena from bleeding gastric ulcer. Differential also includes anemia due to hematologic process in setting of concomitant thrombocytopenia.   #Altered mental status with dysarthria: Concern for acute stroke, possible cranial hemorrhage in setting of low platelets vs. leukostasis from marked leukocytosis.  #Marked leukocytosis concerning for leukemia per Hematology   #Acute thrombocytopenia s/p bone marrow biopsy that non-diagnostic    Recommendations:  - consider CT head for change in mental status; CXR for labored breathing   - color of stool not consistent with upper GI bleeding and Hb stable over past 24 hours; additionally, recent change in mental status and new oxygen requirement would preclude patient from upper endoscopy at this time  - would obtain endoscopy/colonoscopy reports from [facility] as findings could not be found upon chart review in [place]   - continue pantoprazole 40 mg IV BID  - continue to monitor bowel movements for melena  - monitor CBC every 12 hours; transfuse for Hgb<7 or symptoms  - follow up Heme/Onc recommendations  - GI to follow    Anju Mireles PGY-5  Gastroenterology Fellow  Page #[phone]   Page #[phone] 5pm-7am on weekdays, and on weekends

## 2020-10-14 NOTE — PROGRESS NOTE ADULT - ATTENDING COMMENTS
Patient's condition worsened, needing ICU care. Although the cause for deterioration remains unknown, sepsis is highly likely. Rapid worsening of leukocytosis is highly likely to be reactive. Will start Hydrea as we wait for the flow, FISH and hotspot testing results as suggested in fellows note. Supportive.     Patient's daughter was present at the bedside and was explained the hematologic perspective of his condition.
Pt with worsening clinical status today: encephalopathy, respiratory distress, fever, continued melena. Abx initiated, pt is s/p PRBCs. Now after RRT x 2 accepted to MICU. Appreciate critical care interventions on behalf of this patient.
Pt with multiple prior malignancies/chemotherapy treatments, recent pancytopenia that was treated with steroids, now with leukocytosis and thrombocytopenia. Will need Heme consult to review recent (indeterminate) bone marrow biopsy and guide therapy. Monitor for symptoms/signs of bleeding. Consult Wound Care for sacral decubitus ulcer.

## 2020-10-14 NOTE — PROCEDURE NOTE - NSTRACHPOSTINTU_RESP_A_CORE
Breath sounds equal/Breath sounds bilateral/Chest excursion noted/Chest X-Ray/Positive end tidal Co2 noted Chest X-Ray/Chest excursion noted/Positive end tidal Co2 noted/Appropriate capnography/Breath sounds bilateral/Breath sounds equal

## 2020-10-15 NOTE — DISCHARGE NOTE FOR THE EXPIRED PATIENT - HOSPITAL COURSE
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 Patient has noticed increased ski fragility with bruising otherwise in normal state of health. Patient denies sick contacts, travel, unknown substances, changes in medications. Denies headache, changes in vision, lightheadedness, chest pain, shortness of rbeath, nausea, vomiting, diarrhea, constipation, dysuria, fevers chills.   Of note patient has separate MRN [id number]  Patient was admitted to [facility] 9/16 to 9/30 for weakness found to have GIB requiring 7 untis of prbc with EGD done 9/17 - showed gastric ulcer; Bx benign and 9/25 colonscopy showing ticosis and rectal ulcer with no neoplasm and 1 week of coritsol enema. Had home nifedipine changed to amlodipine. Patient also underwent BMB for thrombocytopenia although without clear evidence of MDS was not diagnostic of cancer and discharged to rehab with short course of decadron.    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 Patient has noticed increased ski fragility with bruising otherwise in normal state of health. Patient denies sick contacts, travel, unknown substances, changes in medications. Denies headache, changes in vision, lightheadedness, chest pain, shortness of rbeath, nausea, vomiting, diarrhea, constipation, dysuria, fevers chills.   Of note patient has separate MRN [id number]  Patient was admitted to [facility] 9/16 to 9/30 for weakness found to have GIB requiring 7 units of prbc with EGD done 9/17 - showed gastric ulcer; Bx benign and 9/25 colonoscopy showing ticosis and rectal ulcer with no neoplasm and 1 week of cortisol enema. Had home nifedipine changed to amlodipine. Patient also underwent BMB for thrombocytopenia although without clear evidence of MDS was not diagnostic of cancer and discharged to rehab with short course of decadron    In the ED vitals 98.2, 70 HR, /74, RR 16, 97% RA  Labs: WBC 76.82, Plt 35, Hgb 8.0    RRT called 10/14 in AM due to hypotension to 80s and altered mental status. On examination, patient appeared encephalopathic as he was unable to answer any questions. Rectal temperature was ascertained to be 101.5; while undergoing rectal temperature assessment, patient was noted to have melanotic stool. Patient was given fluid and ordered for transfusion. During rapid, his blood pressure was noted to improve to -140s. Patient was determined to not be a candidate for MICU at the time.    A second RRT was called because the patient developed increased work of breathing. Patient remained alert and oriented but continually stated that he was unable to breath; patient remained hemodynamically stable. He was placed on nonrebreather mask and was to be initiated on BIPAP but given clinical status, decision was made to transfer patient to the MICU for pending respiratory failure. On arrival to the MICU, patient was intubated and central and arterial lines were placed.

## 2020-10-15 NOTE — CHART NOTE - NSCHARTNOTEFT_GEN_A_CORE
MEDICINE NOTE    Called to bedside to evaluate the patient for pulselessness.     On physical exam, patient did not respond to verbal or noxious stimuli.  No spontaneous respirations.  Absent heart and breath sounds.  Absent radial and carotid pulses.  Pupils are fixed and dilated, no corneal reflex.  EKG rhythm strip shows asystole.   Patient pronounced dead at 6:26 AM.  Attending notified.  Family wanting autopsy.

## 2020-10-16 LAB
-  AMIKACIN: SIGNIFICANT CHANGE UP
-  AMPICILLIN/SULBACTAM: SIGNIFICANT CHANGE UP
-  AMPICILLIN: SIGNIFICANT CHANGE UP
-  AZTREONAM: SIGNIFICANT CHANGE UP
-  CEFAZOLIN: SIGNIFICANT CHANGE UP
-  CEFEPIME: SIGNIFICANT CHANGE UP
-  CEFOXITIN: SIGNIFICANT CHANGE UP
-  CEFTRIAXONE: SIGNIFICANT CHANGE UP
-  CIPROFLOXACIN: SIGNIFICANT CHANGE UP
-  ERTAPENEM: SIGNIFICANT CHANGE UP
-  GENTAMICIN: SIGNIFICANT CHANGE UP
-  IMIPENEM: SIGNIFICANT CHANGE UP
-  LEVOFLOXACIN: SIGNIFICANT CHANGE UP
-  MEROPENEM: SIGNIFICANT CHANGE UP
-  PIPERACILLIN/TAZOBACTAM: SIGNIFICANT CHANGE UP
-  TOBRAMYCIN: SIGNIFICANT CHANGE UP
-  TRIMETHOPRIM/SULFAMETHOXAZOLE: SIGNIFICANT CHANGE UP
CULTURE RESULTS: SIGNIFICANT CHANGE UP
CULTURE RESULTS: SIGNIFICANT CHANGE UP
METHOD TYPE: SIGNIFICANT CHANGE UP
ORGANISM # SPEC MICROSCOPIC CNT: SIGNIFICANT CHANGE UP
SPECIMEN SOURCE: SIGNIFICANT CHANGE UP
SPECIMEN SOURCE: SIGNIFICANT CHANGE UP
TM INTERPRETATION: SIGNIFICANT CHANGE UP

## 2020-10-16 RX ORDER — DEXAMETHASONE 0.5 MG/5ML
1 ELIXIR ORAL
Qty: 0 | Refills: 0 | DISCHARGE
Start: 2020-10-16

## 2020-10-20 LAB — LAP WBC-ACNC: 6 — SIGNIFICANT CHANGE UP

## 2020-10-22 LAB — JAK2 P.V617F BLD/T QL: SIGNIFICANT CHANGE UP

## 2021-03-22 NOTE — DIETITIAN INITIAL EVALUATION ADULT. - PROBLEM/PLAN-4
"[facility] Medicine Progress Note  Date of Service: 03/22/2021    Assessment & Plan   Maurice Jon is a 61 year old male who presented on 3/20/2021 with \"foggy thinking, fatigue, and chest pressure\".     Hepatic encephalopathy     Reports \"foggy thinking\" and slow though process x 4 days. Occurs in the setting of known cirrhosis with worsening abdominal distention and jaundice, admit ammonia elevated (60). Head CT negative for acute intracranial abnormality. Not on lactulose at home.    Was given lactulose 20 g in the emergency department and started on 10g bid. Had several BM since admission. Encephalopathy nearly resolved.   - Continue lactulose 10 g bid. May need to be discharged on low dose lactulose.      Atypical chest pain / pressure  Suspect anxiety    Present 4 days prior to admission. See below regarding cardiac history. Troponin normal x 2 (0.032 -> 0.034). EKG with paced rhythm, no obvious ST changes but also difficult to evaluate on paced rhythm. Chest x-ray unremarkable. Suspect the chest pressure is related to cirrhosis / ascites and increased fluid volume rather than acute coronary syndrome.    No more CP at this time. No intervention. Suspect anxiety.     Liver Cirrhosis due to fatty liver, with Ascites  Portal hypertension  Portal vein thrombosis    Previously diagnosed, follows with Dr. Roque (hepatology). Patient does not drink alcohol, cirrhosis thought to be due to fatty liver vs possibly hereditary. Recent increased abdominal distention but no pain or fever. Has never needed paracentesis in the past. MELD-Na = 18 (although possibly falsely elevated due to elevated INR from patient being on coumadin). Managed prior to admission with spironolactone 25 mg daily and torsemide (40 mg q am / 20 mg at noon); patient reports difficulty with consistent compliance with torsemide.     Initially concerned for worsening ascites, but RUQ ultrasound does not show " significant ascites.   - Continue spironolactone and torsemide, increase spironolactone to 50 mg daily     Chronic a-fib, S/P Ablation 12/22/18 -- on Warfarin  Cardiac pacemaker in situ  Long-term (current) use of anticoagulants    Follows cardiology Dr. Evans and IRMA Diaz. Has pacemaker in situ after prior ablation in 2018. Apparently has episodes of VT, typically occur when he is asleep not using his CPAP. Rate controlled prior to admission with metoprolol XL 25 mg daily (which he does not consistently take). Anticoagulated with coumadin, admit INR 2.58.   - Continue metoprolol XL daily with holding parameters. RUQ US today-if moderate ascitis present, will hold coumadin tonight for paracentesis     Right heart failure / HFpEF  Cardiomyopathy    Last echo 2019 with EF 50-55%, reduced left ventricular function, moderate right ventricular dilation and global reduction in right ventricular function. Likely due to known portal hypertension. Chest x-ray does not demonstrate abnormal pulmonary vascularity. BNP normal. Does not appear to be in florid heart failure on admission, although does report some inconsistent use of his torsemide and metoprolol, which may be making his heart failure worse.   - Resume regular use of beta blocker and torsemide     CAD (coronary artery disease)    Last angiogram in 2019, minimal non-obstructive coronary artery disease. Managed prior to admission with beta blocker, but is not on aspirin, statin, or ACEi. See above regarding atypical chest pain - do not suspect acute coronary syndrome at this time.    - See above regarding atypical chest pain   - Defer to outpatient cardiology management    Muscle cramps    Has severe B LE cramps periodically even at home. Uses magnesium.  -start po magnesium as at home.      GERD (gastroesophageal reflux disease)    Managed prior to admission with Protonix 40 mg daily, continue.      BRUCE-Mild (AHI 9; REM RDI 31)    Patient uses CPAP at home,  develops runs of VT / SVT on occasion when he does not use it.  Pt to have his home CPAP delivered today if possible.     COVID status - negative     Tested as asymptomatic COVID screen based on hospital admission criteria. No concern for active COVID infection on admission.  - COVID PCR - negative 3/20/21  - No indication for COVID precautions or repeat testing     DVT Prophylaxis: warfarin  Code Status: Full Code    Lines: PIV   Harrison catheter: none    Discussion: Improving.  Appears to have significant anxiety.  Patient very nervous about discharging this point.    Disposition: Anticipate discharge likely tomorrow    Attestation:  Total time: 35 minutes    Nacho Morse MD  [facility] Medicine        Interval History   Feels somewhat better but still feels like he may be not at baseline.  Has multiple diffuse complaints.  Has not had any exertional chest pain or neck pain.  No shortness of breath today.    Physical Exam   Temp:  [97.6  F (36.4  C)-99.2  F (37.3  C)] 98  F (36.7  C)  Pulse:  [65-71] 71  Resp:  [16-18] 16  BP: (107-120)/(51-60) 109/58  SpO2:  [92 %-98 %] 98 %    Weights:   Vitals:    03/20/21 1609 03/20/21 2325   Weight: 100.7 kg (222 lb) 98.1 kg (216 lb 4.3 oz)    Body mass index is 38.31 kg/m .    Constitutional: Alert and oriented x4.  Patient appears very anxious and seems to be wearing what every little thing.  Difficult to reassure.  Does not otherwise appear distressed.  CV: Regular, 2-3+ edema lower extremities  Respiratory: CTA bilaterally  GI: Soft, non-tender, bowel sounds active  Skin: Warm and dry    Data   Recent Labs   Lab 03/22/21  1206 03/22/21  0457 03/21/21  1607 03/21/21  1057 03/21/21  0512 03/20/21  1917 03/20/21  1615   WBC  --  3.1*  --   --  4.1  --  3.8*   HGB  --  9.0*  --   --  8.7*  --  9.7*   MCV  --  89  --   --  89  --  92   PLT  --  56*  --   --  57*  --  72*   INR  --  2.34*  --  2.39*  --   --  2.58*   NA  --  141  --   --  138  --  140   POTASSIUM 3.8 3.1* 3.6  --   3.0*  3.0*  --  3.5   CHLORIDE  --  104  --   --  102  --  105   CO2  --  32  --   --  28  --  25   BUN  --  17  --   --  17  --  18   CR  --  0.88  --   --  0.93  --  0.90   ANIONGAP  --  5  --   --  8  --  10   HALEY  --  8.0*  --   --  7.7*  --  8.4*   GLC  --  104*  --   --  115*  --  95   ALBUMIN  --  2.8*  --   --  2.9*  --  3.2*   PROTTOTAL  --  5.2*  --   --  5.2*  --  5.9*   BILITOTAL  --  1.0  --   --  1.3  --  1.2   ALKPHOS  --  74  --   --  79  --  99   ALT  --  45  --   --  48  --  57   AST  --  43  --   --  54*  --  73*   TROPI  --   --   --   --  0.032 0.034 0.032       Recent Labs   Lab 03/22/21  0457 03/21/21  0512 03/20/21  1615   * 115* 95        Unresulted Labs Ordered in the Past 30 Days of this Admission     No orders found from 2/18/2021 to 3/21/2021.           Imaging: No results found for this or any previous visit (from the past 24 hour(s)).     I reviewed all new labs and imaging results over the last 24 hours. I personally reviewed no images or EKG's today.    Medications     - MEDICATION INSTRUCTIONS -       Warfarin Therapy Reminder         lactulose  10 g Oral BID     magnesium oxide  400 mg Oral At Bedtime     magnesium oxide  400 mg Oral Daily     metoprolol succinate ER  25 mg Oral Daily     pantoprazole  40 mg Oral Daily     potassium chloride ER  20 mEq Oral Daily     spironolactone  25 mg Oral Daily     torsemide  20 mg Oral Daily with lunch     torsemide  40 mg Oral QAM     warfarin ANTICOAGULANT  2.5 mg Oral ONCE at 18:00   Reviewed    Nacho Morse MD  [facility] Medicine      DISPLAY PLAN FREE TEXT

## 2021-09-02 NOTE — H&P ADULT - REASON FOR ADMISSION
[facility] ambulatory encounter  FAMILY PRACTICE OFFICE VISIT    CHIEF COMPLAINT:    Chief Complaint   Patient presents with   • Follow-up     Rm 2-FUV lab results        SUBJECTIVE:  José Manuel Garcia is a 58 year old male who presented requesting evaluation for for follow up blood work. He was diagnosed with chronic lymphocytic leukemia about 10 years ago.  He is wondering what kind of follow-up he needs to do for this.  He is asymptomatic.  Patient has history of coronary artery disease.  He is letting me know he was given the okay by Cardiology to hold off Plavix or Brilinta 5-7 days prior to having a colonoscopy.  No acute complaints.     Review of systems:   Constitutional: Negative for fever and chills.   Skin: Negative for rash.   HEENT: Negative for eye drainage, rhinorrhea, ear pain or sore throat.  Respiratory: Negative for cough, wheezing or shortness of breath.    Cardiovascular: Negative for chest pain, chest pressure, palpitations or diaphoresis.   Gastrointestinal: Negative for nausea, vomiting, diarrhea or abdominal pain.   Genitourinary: Negative for dysuria, urgency, frequency, hematuria or flank pain.  Extremities: Negative for joint swelling or joint pain.  Neurologic: Negative for change in sensory or motor function.  Negative for headache.  Endocrine: Negative for heat or cold intolerance, weight loss or gain.  Hematological: Negative for bleeding, bruising or adenopathy.  Psychiatric: Negative for change in affect, change in mentation or sleep disturbance.     OBJECTIVE:  PROBLEM LIST:   Patient Active Problem List   Diagnosis   • CLL (chronic lymphocytic leukemia) (CMS/[facility])   • Atherosclerosis of native coronary artery of native heart without angina pectoris       PAST HISTORIES:   I have reviewed the past medical history, family history, social history, medications and allergies listed in the medical record as obtained by my nursing staff and support staff and agree with their  documentation.  ALLERGIES:  No Known Allergies  Current Outpatient Medications   Medication Sig Dispense Refill   • clopidogrel (Plavix) 75 MG tablet Take 1 tablet by mouth daily. 90 tablet 3   • aspirin 81 MG chewable tablet CHEW 1 TABLET DAILY 90 tablet 3   • metoPROLOL succinate (TOPROL-XL) 25 MG 24 hr tablet Take 1 tablet by mouth daily. 90 tablet 2   • atorvastatin (LIPITOR) 80 MG tablet Take 1 tablet by mouth nightly. 90 tablet 3   • nitroGLYcerin (NITROSTAT) 0.4 MG sublingual tablet Place 1 tablet under the tongue every 5 minutes as needed for Chest pain for up to 3 doses. Call 911 after 3rd dose. 25 tablet 3     No current facility-administered medications for this visit.     Past Medical History:   Diagnosis Date   • History of nephrolithiasis     s/p laser lithotripsy   • Left wrist fracture        PHYSICAL EXAM:   Vital Signs:    Visit Vitals  /68   Pulse 69   Resp 16   Ht 5' 3\" (1.6 m)   Wt 69 kg   BMI 26.95 kg/m²     General:   Alert, cooperative, in no acute distress.  Skin:  Warm and dry without rash.    Head:  Normocephalic, atraumatic.   Neck:  Trachea is midline.     Eyes:  Normal conjunctivae and sclerae.  ENT:  Mucous membranes are moist.  Cardiovascular:  Symmetrical pulses.   Respiratory:  Normal respiratory effort.   Gastrointestinal:  Non-distended.    Musculoskeletal:  No deformity or edema.   Neurologic:  Oriented x4.  No focal deficits.    LAB RESULTS:   Lab Results   Component Value Date    WBC 39.2 (H) 08/09/2021    HCT 43.9 08/09/2021    HGB 14.3 08/09/2021     08/09/2021     Lab Results   Component Value Date    GLUCOSE 71 08/09/2021    SODIUM 140 08/09/2021    POTASSIUM 4.1 08/09/2021    CHLORIDE 109 (H) 08/09/2021    BUN 25 (H) 08/09/2021    CREATININE 1.24 (H) 08/09/2021    CALCIUM 9.0 08/09/2021    ALBUMIN 4.3 08/09/2021    TP 7.6 06/18/2012    AST 19 08/09/2021    ALKPT 116 08/09/2021    GPT 36 08/09/2021    ANIONGAP 9 (L) 08/09/2021    BCRAT 20 08/09/2021    GLOB 2.7  thrombocytopenia 08/09/2021    AGR 1.6 08/09/2021     Lab Results   Component Value Date    CHOLESTEROL 120 08/09/2021    HDL 41 08/09/2021    CALCLDL 66 08/09/2021    TRIGLYCERIDE 66 08/09/2021       ASSESSMENT:   1. CLL (chronic lymphocytic leukemia) (CMS/HCC)    2. Atherosclerosis of native coronary artery of native heart without angina pectoris    3. Screen for colon cancer        PLAN:   Orders Placed This Encounter   • OPEN ACCESS COLONOSCOPY   • SERVICE TO HEMATOLOGY ONCOLOGY     Patient has been counseled about diagnosis, treatment and management, medication side effects and benefits.   Patient is in stable condition.  Patient will continue all present medications and management.   Patient has been referred to Hematology for further management of CLL.  Order for open access colonoscopy has been placed.    He will continue to follow-up with cardiology as scheduled.  Patient will follow up with us in 6 months.  May return sooner if needed.    Instructions provided as documented in the after visit summary.    The patient indicated understanding of the diagnosis and agreed with the plan of care.     Lindsay Grimm MD

## 2021-12-07 NOTE — ED ADULT NURSE NOTE - CHPI ED NUR AGGRAVATING FX
Problem:  Activity:  Goal: Ability to return to normal activity level will improve  Description: Ability to return to normal activity level will improve  Outcome: Ongoing     Problem: Health Behavior:  Goal: Identification of resources available to assist in meeting health care needs will improve  Description: Identification of resources available to assist in meeting health care needs will improve  Outcome: Ongoing     Problem: Safety:  Goal: Ability to remain free from injury will improve  Description: Ability to remain free from injury will improve  Outcome: Ongoing     Problem: Skin Integrity:  Goal: Demonstration of wound healing without infection will improve  Description: Demonstration of wound healing without infection will improve  Outcome: Ongoing  Goal: Will show no infection signs and symptoms  Description: Will show no infection signs and symptoms  Outcome: Ongoing  Goal: Absence of new skin breakdown  Description: Absence of new skin breakdown  Outcome: Ongoing none

## 2022-04-03 NOTE — PHYSICAL THERAPY INITIAL EVALUATION ADULT - BALANCE TRAINING, PT EVAL
03-Apr-2022 12:55
Independent sitting, transfers, standing and ambulation with good balance using appropriate assistive device and prevent falls.

## 2022-05-17 NOTE — ED ADULT NURSE REASSESSMENT NOTE - ANCILLARY STATUS
awaiting radiology Quality 111:Pneumonia Vaccination Status For Older Adults: Pneumococcal Vaccination Previously Received Quality 110: Preventive Care And Screening: Influenza Immunization: Influenza Immunization Administered during Influenza season Detail Level: Detailed

## 2025-01-20 NOTE — CONSULT NOTE ADULT - ATTENDING COMMENTS
Patient: Jie Aragon    Procedure Summary       Date: 01/20/25 Room / Location: [facility] OR [address] [place] [address] COR OR    Anesthesia Start: 0832 Anesthesia Stop: 0902    Procedure: COLONOSCOPY FOR SCREENING Diagnosis:       Encounter for screening for malignant neoplasm of colon      (Encounter for screening for malignant neoplasm of colon [Z12.11])    Surgeons: Ronni Smith MD Provider:     Anesthesia Type: general ASA Status: 3            Anesthesia Type: general    Vitals  Vitals Value Taken Time   /74 01/20/25 0925   Temp 97.2 °F (36.2 °C) 01/20/25 0905   Pulse 75 01/20/25 0932   Resp 16 01/20/25 0910   SpO2 97 % 01/20/25 0932   Vitals shown include unfiled device data.        Post Anesthesia Care and Evaluation    Patient location during evaluation: PHASE II  Patient participation: complete - patient participated  Level of consciousness: awake and alert  Pain score: 1  Pain management: adequate    Airway patency: patent  Anesthetic complications: No anesthetic complications  PONV Status: controlled  Cardiovascular status: acceptable  Respiratory status: acceptable  Hydration status: acceptable    
80 year old male with a history of resected colon cancer. Pt admitted with thrombocytopenia and a WBC cout of 70. Is having hematochezia. prior w/u showed a gastric ulcer and a rectal ulcer.    Plan: Heme work up prior to endoscopy.
80-year-old man consulted for thrombocytopenia and leukocytosis. Also noted to have splenomegaly. Patient appears to be be in his usual health. Peripheral smear suggestive of MDS/MPN disorder. Agree with Fellow's documentation.
80 M with colon ca s/p colectomy in past, prostate ca, recently admitted for GIB and irregular labs with unclear diagnosis, returns with GI bleed, found to have leukemoid reaction, anemia, thrombocytopenia, lactic acidosis, all of unclear etiology. Some labs suggestive of hemolysis, but  uric acid not elevated.  Required intubation for acute hypoxic respiratory failure likely due to profound metabolic acidosis.  Has fevers, though no clear infectious source.  Start broad abx, get ct chest/a/p.  F/u heme reccs, specifically checking repeat  smear given extremely high WBC.  Prognosis guarded.
